# Patient Record
Sex: FEMALE | Race: BLACK OR AFRICAN AMERICAN | NOT HISPANIC OR LATINO | Employment: UNEMPLOYED | RURAL
[De-identification: names, ages, dates, MRNs, and addresses within clinical notes are randomized per-mention and may not be internally consistent; named-entity substitution may affect disease eponyms.]

---
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
## 2020-03-23 ENCOUNTER — HISTORICAL (OUTPATIENT)
Dept: ADMINISTRATIVE | Facility: HOSPITAL | Age: 35
End: 2020-03-23

## 2020-03-26 LAB
LAB AP CLINICAL INFORMATION: NORMAL
LAB AP COMMENTS: NORMAL
LAB AP GENERAL CAT - HISTORICAL: NORMAL
LAB AP INTERPRETATION/RESULT - HISTORICAL: NEGATIVE
LAB AP SPECIMEN ADEQUACY - HISTORICAL: NORMAL
LAB AP SPECIMEN SUBMITTED - HISTORICAL: NORMAL

## 2020-07-13 LAB
LEFT EYE DM RETINOPATHY: NORMAL
RIGHT EYE DM RETINOPATHY: NORMAL

## 2021-01-29 ENCOUNTER — HISTORICAL (OUTPATIENT)
Dept: ADMINISTRATIVE | Facility: HOSPITAL | Age: 36
End: 2021-01-29

## 2021-01-29 LAB
ABO: NORMAL
ALBUMIN SERPL BCP-MCNC: 1.6 G/DL (ref 3.5–5)
ALBUMIN/GLOB SERPL: 0.2 {RATIO}
ALP SERPL-CCNC: 141 U/L (ref 37–98)
ALT SERPL W P-5'-P-CCNC: 14 U/L (ref 13–56)
AMPHET UR QL SCN: NEGATIVE NG/ML
ANION GAP SERPL CALCULATED.3IONS-SCNC: 25 MMOL/L
ANION GAP SERPL CALCULATED.3IONS-SCNC: 31 MMOL/L
ANION GAP SERPL CALCULATED.3IONS-SCNC: 31 MMOL/L
ANISOCYTOSIS BLD QL SMEAR: ABNORMAL
ANTIBODY SCREEN: NORMAL
AST SERPL W P-5'-P-CCNC: 21 U/L (ref 15–37)
BACTERIA #/AREA URNS HPF: ABNORMAL /HPF
BARBITURATES UR QL SCN: NEGATIVE NG/ML
BASOPHILS # BLD AUTO: 0.04 X10E3/UL (ref 0–0.2)
BASOPHILS NFR BLD AUTO: 0.2 % (ref 0–1)
BENZODIAZ METAB UR QL SCN: NEGATIVE NG/ML
BILIRUB SERPL-MCNC: 0.3 MG/DL (ref 0–1.2)
BILIRUB UR QL STRIP: NEGATIVE MG/DL
BUN SERPL-MCNC: 29 MG/DL (ref 7–18)
BUN SERPL-MCNC: 31 MG/DL (ref 7–18)
BUN SERPL-MCNC: 32 MG/DL (ref 7–18)
BUN/CREAT SERPL: 16.6
CALCIUM SERPL-MCNC: 8.7 MG/DL (ref 8.5–10.1)
CALCIUM SERPL-MCNC: 8.8 MG/DL (ref 8.5–10.1)
CALCIUM SERPL-MCNC: 9 MG/DL (ref 8.5–10.1)
CANNABINOIDS UR QL SCN: NEGATIVE NG/ML
CHLORIDE SERPL-SCNC: 100 MMOL/L (ref 98–107)
CHLORIDE SERPL-SCNC: 106 MMOL/L (ref 98–107)
CHLORIDE SERPL-SCNC: 95 MMOL/L (ref 98–107)
CLARITY UR: CLEAR
CLARITY UR: CLEAR
CO2 SERPL-SCNC: 15 MMOL/L (ref 21–32)
CO2 SERPL-SCNC: 15 MMOL/L (ref 21–32)
CO2 SERPL-SCNC: 19 MMOL/L (ref 21–32)
COCAINE UR QL SCN: NEGATIVE NG/ML
COLOR UR: ABNORMAL
COLOR UR: ABNORMAL
CREAT SERPL-MCNC: 1.65 MG/DL (ref 0.55–1.02)
CREAT SERPL-MCNC: 1.79 MG/DL (ref 0.55–1.02)
CREAT SERPL-MCNC: 1.93 MG/DL (ref 0.55–1.02)
CRENATED CELLS: ABNORMAL
DOHLE BOD BLD QL SMEAR: ABNORMAL
EOSINOPHIL # BLD AUTO: 0 X10E3/UL (ref 0–0.5)
EOSINOPHIL NFR BLD AUTO: 0 % (ref 1–4)
ERYTHROCYTE [DISTWIDTH] IN BLOOD BY AUTOMATED COUNT: 18.9 % (ref 11.5–14.5)
EST. AVERAGE GLUCOSE BLD GHB EST-MCNC: 290 MG/DL
ETHANOL SERPL-MCNC: NORMAL MG/DL (ref 0–10)
GLOBULIN SER-MCNC: 6.7 G/DL (ref 2–4)
GLUCOSE SERPL-MCNC: 251 MG/DL (ref 70–105)
GLUCOSE SERPL-MCNC: 292 MG/DL (ref 70–105)
GLUCOSE SERPL-MCNC: 348 MG/DL (ref 74–106)
GLUCOSE SERPL-MCNC: 350 MG/DL (ref 70–105)
GLUCOSE SERPL-MCNC: 617 MG/DL (ref 74–106)
GLUCOSE SERPL-MCNC: 654 MG/DL (ref 74–106)
GLUCOSE UR STRIP-MCNC: 100 MG/DL
HBA1C MFR BLD HPLC: 11.3 %
HCG SERUM QUALITATIVE: NEGATIVE
HCG UR QL IA.RAPID: NEGATIVE
HCT VFR BLD AUTO: 22.2 % (ref 38–47)
HCT VFR BLD AUTO: 25.4 % (ref 38–47)
HCT VFR BLD AUTO: 27.1 % (ref 38–47)
HGB BLD-MCNC: 5.9 G/DL (ref 12–16)
HGB BLD-MCNC: 6.8 G/DL (ref 12–16)
HGB BLD-MCNC: 7.5 G/DL (ref 12–16)
HYPOCHROMIA BLD QL SMEAR: ABNORMAL
IMM GRANULOCYTES # BLD AUTO: 0.42 X10E3/UL (ref 0–0.04)
IMM GRANULOCYTES NFR BLD: 1.6 % (ref 0–0.4)
KETONES UR STRIP-SCNC: 40 MG/DL
KETONES UR STRIP-SCNC: ABNORMAL MMOL/L
LEUKOCYTE ESTERASE UR QL STRIP: NEGATIVE LEU/UL
LIPASE SERPL-CCNC: 58 U/L (ref 73–393)
LYMPHOCYTES # BLD AUTO: 1.14 X10E3/UL (ref 1–4.8)
LYMPHOCYTES NFR BLD AUTO: 4.4 % (ref 27–41)
LYMPHOCYTES NFR BLD MANUAL: 6 % (ref 27–41)
MAGNESIUM SERPL-MCNC: 2.1 MG/DL (ref 1.7–2.3)
MAGNESIUM SERPL-MCNC: 2.4 MG/DL (ref 1.7–2.3)
MCH RBC QN AUTO: 17.5 PG (ref 27–31)
MCHC RBC AUTO-ENTMCNC: 26.6 G/DL (ref 32–36)
MCHC RBC AUTO-ENTMCNC: 26.8 G/DL (ref 32–36)
MCHC RBC AUTO-ENTMCNC: 27.7 G/DL (ref 32–36)
MCV RBC AUTO: 65.9 FL (ref 80–96)
MICROCYTES BLD QL SMEAR: ABNORMAL
MONOCYTES # BLD AUTO: 2.04 X10E3/UL (ref 0–0.8)
MONOCYTES NFR BLD AUTO: 7.9 % (ref 2–6)
MONOCYTES NFR BLD MANUAL: 7 % (ref 2–6)
MPC BLD CALC-MCNC: 10.8 FL (ref 9.4–12.4)
MUCOUS THREADS #/AREA URNS HPF: ABNORMAL /HPF
MYELOCYTES NFR BLD MANUAL: 1 %
NEUTROPHILS # BLD AUTO: 22.34 X10E3/UL (ref 1.8–7.7)
NEUTROPHILS NFR BLD AUTO: 85.9 % (ref 53–65)
NEUTS BAND NFR BLD MANUAL: 16 % (ref 1–5)
NEUTS SEG NFR BLD MANUAL: 70 % (ref 50–62)
NITRITE UR QL STRIP: NEGATIVE
NRBC # BLD AUTO: 0 X10E3/UL (ref 0–0)
NRBC, AUTO (.00): 0 /100 (ref 0–0)
OPIATES UR QL SCN: NEGATIVE NG/ML
OVALOCYTES BLD QL SMEAR: ABNORMAL
PCP UR QL SCN: NEGATIVE NG/ML
PH UR STRIP: 5.5 PH UNITS (ref 5–8)
PHOSPHATE SERPL-MCNC: 2.4 MG/DL (ref 2.5–4.5)
PHOSPHATE SERPL-MCNC: 3.6 MG/DL (ref 2.5–4.5)
PLATELET # BLD AUTO: 670 X10E3/UL (ref 150–400)
PLATELET MORPHOLOGY: ABNORMAL
POC OCCULT BLOOD STOOL: POSITIVE
POLYCHROMASIA BLD QL SMEAR: ABNORMAL
POTASSIUM SERPL-SCNC: 3.6 MMOL/L (ref 3.5–5.1)
POTASSIUM SERPL-SCNC: 5 MMOL/L (ref 3.5–5.1)
POTASSIUM SERPL-SCNC: 5.1 MMOL/L (ref 3.5–5.1)
PROT SERPL-MCNC: 8.3 G/DL (ref 6.4–8.2)
PROT UR QL STRIP: 100 MG/DL
RBC # BLD AUTO: 3.37 X10E6/UL (ref 4.2–5.4)
RBC # UR STRIP: ABNORMAL ERY/UL
RBC #/AREA URNS HPF: ABNORMAL /HPF (ref 0–3)
RH TYPE: NORMAL
SARS-COV-2 RNA AMPLIFICATION, QUAL: NEGATIVE
SODIUM SERPL-SCNC: 136 MMOL/L (ref 136–145)
SODIUM SERPL-SCNC: 141 MMOL/L (ref 136–145)
SODIUM SERPL-SCNC: 146 MMOL/L (ref 136–145)
SP GR UR STRIP: 1.01 (ref 1–1.03)
SQUAMOUS #/AREA URNS LPF: ABNORMAL /LPF
TARGETS BLD QL SMEAR: ABNORMAL
TRICHOMONAS #/AREA URNS HPF: ABNORMAL /HPF
TROPONIN I SERPL-MCNC: <0.017 NG/ML (ref 0–0.06)
TSH SERPL DL<=0.005 MIU/L-ACNC: 1.76 UIU/ML (ref 0.36–3.74)
UNIT NUMBER: NORMAL
UNIT NUMBER: NORMAL
UNIT STATUS: NORMAL
UNIT STATUS: NORMAL
UROBILINOGEN UR STRIP-ACNC: 0.2 EU/DL
WBC # BLD AUTO: 25.98 X10E3/UL (ref 4.5–11)
WBC #/AREA URNS HPF: ABNORMAL /HPF (ref 0–5)
YEAST #/AREA URNS HPF: ABNORMAL /HPF

## 2021-01-30 ENCOUNTER — HISTORICAL (OUTPATIENT)
Dept: ADMINISTRATIVE | Facility: HOSPITAL | Age: 36
End: 2021-01-30

## 2021-01-30 LAB
ANION GAP SERPL CALCULATED.3IONS-SCNC: 15 MMOL/L
ANION GAP SERPL CALCULATED.3IONS-SCNC: 16 MMOL/L
ANION GAP SERPL CALCULATED.3IONS-SCNC: 19 MMOL/L
ANISOCYTOSIS BLD QL SMEAR: ABNORMAL
APTT PPP: 30.4 SECONDS (ref 25.2–37.3)
BASOPHILS # BLD AUTO: 0.03 X10E3/UL (ref 0–0.2)
BASOPHILS NFR BLD AUTO: 0.2 % (ref 0–1)
BUN SERPL-MCNC: 18 MG/DL (ref 7–18)
BUN SERPL-MCNC: 20 MG/DL (ref 7–18)
BUN SERPL-MCNC: 22 MG/DL (ref 7–18)
CALCIUM SERPL-MCNC: 8 MG/DL (ref 8.5–10.1)
CALCIUM SERPL-MCNC: 8.1 MG/DL (ref 8.5–10.1)
CALCIUM SERPL-MCNC: 8.1 MG/DL (ref 8.5–10.1)
CHLORIDE SERPL-SCNC: 111 MMOL/L (ref 98–107)
CHLORIDE SERPL-SCNC: 113 MMOL/L (ref 98–107)
CHLORIDE SERPL-SCNC: 114 MMOL/L (ref 98–107)
CO2 SERPL-SCNC: 20 MMOL/L (ref 21–32)
CO2 SERPL-SCNC: 22 MMOL/L (ref 21–32)
CO2 SERPL-SCNC: 23 MMOL/L (ref 21–32)
CREAT SERPL-MCNC: 1.25 MG/DL (ref 0.55–1.02)
CREAT SERPL-MCNC: 1.42 MG/DL (ref 0.55–1.02)
CREAT SERPL-MCNC: 1.58 MG/DL (ref 0.55–1.02)
CRENATED CELLS: ABNORMAL
CRP SERPL-MCNC: 32 UG/ML (ref 0–0.8)
EOSINOPHIL # BLD AUTO: 0.01 X10E3/UL (ref 0–0.5)
EOSINOPHIL NFR BLD AUTO: 0.1 % (ref 1–4)
ERYTHROCYTE [DISTWIDTH] IN BLOOD BY AUTOMATED COUNT: 21.8 % (ref 11.5–14.5)
ERYTHROCYTE [SEDIMENTATION RATE] IN BLOOD BY WESTERGREN METHOD: 137 MM/HR (ref 0–20)
FERRITIN SERPL-MCNC: 48 NG/ML (ref 8–252)
FOLATE SERPL-MCNC: 4.3 NG/ML (ref 3.1–17.5)
GLUCOSE SERPL-MCNC: 103 MG/DL (ref 70–105)
GLUCOSE SERPL-MCNC: 104 MG/DL (ref 70–105)
GLUCOSE SERPL-MCNC: 111 MG/DL (ref 70–105)
GLUCOSE SERPL-MCNC: 125 MG/DL (ref 70–105)
GLUCOSE SERPL-MCNC: 131 MG/DL (ref 70–105)
GLUCOSE SERPL-MCNC: 137 MG/DL (ref 70–105)
GLUCOSE SERPL-MCNC: 137 MG/DL (ref 74–106)
GLUCOSE SERPL-MCNC: 158 MG/DL (ref 74–106)
GLUCOSE SERPL-MCNC: 173 MG/DL (ref 70–105)
GLUCOSE SERPL-MCNC: 177 MG/DL (ref 70–105)
GLUCOSE SERPL-MCNC: 189 MG/DL (ref 70–105)
GLUCOSE SERPL-MCNC: 212 MG/DL (ref 70–105)
GLUCOSE SERPL-MCNC: 212 MG/DL (ref 74–106)
GLUCOSE SERPL-MCNC: 217 MG/DL (ref 70–105)
GLUCOSE SERPL-MCNC: 218 MG/DL (ref 70–105)
GLUCOSE SERPL-MCNC: 219 MG/DL (ref 70–105)
GLUCOSE SERPL-MCNC: 242 MG/DL (ref 70–105)
GLUCOSE SERPL-MCNC: 253 MG/DL (ref 70–105)
GLUCOSE SERPL-MCNC: 76 MG/DL (ref 70–105)
HCT VFR BLD AUTO: 23.2 % (ref 38–47)
HCT VFR BLD AUTO: 26.8 % (ref 38–47)
HGB BLD-MCNC: 6.9 G/DL (ref 12–16)
HGB BLD-MCNC: 8.1 G/DL (ref 12–16)
HYPOCHROMIA BLD QL SMEAR: ABNORMAL
IMM GRANULOCYTES # BLD AUTO: 0.21 X10E3/UL (ref 0–0.04)
IMM GRANULOCYTES NFR BLD: 1.2 % (ref 0–0.4)
IMM RETICS NFR: 22.5 % (ref 3–15.9)
INR BLD: 1.29 (ref 0–3.3)
IRON SATN MFR SERPL: 19 % (ref 14–50)
IRON SERPL-MCNC: 27 UG/DL (ref 50–170)
LDH SERPL-CCNC: 187 U/L (ref 84–246)
LYMPHOCYTES # BLD AUTO: 1.03 X10E3/UL (ref 1–4.8)
LYMPHOCYTES NFR BLD AUTO: 5.8 % (ref 27–41)
LYMPHOCYTES NFR BLD MANUAL: 6 % (ref 27–41)
MAGNESIUM SERPL-MCNC: 1.7 MG/DL (ref 1.7–2.3)
MAGNESIUM SERPL-MCNC: 1.7 MG/DL (ref 1.7–2.3)
MAGNESIUM SERPL-MCNC: 1.8 MG/DL (ref 1.7–2.3)
MCH RBC QN AUTO: 20.1 PG (ref 27–31)
MCHC RBC AUTO-ENTMCNC: 29.7 G/DL (ref 32–36)
MCHC RBC AUTO-ENTMCNC: 30.2 G/DL (ref 32–36)
MCV RBC AUTO: 67.4 FL (ref 80–96)
MICROCYTES BLD QL SMEAR: ABNORMAL
MONOCYTES # BLD AUTO: 1.14 X10E3/UL (ref 0–0.8)
MONOCYTES NFR BLD AUTO: 6.4 % (ref 2–6)
MONOCYTES NFR BLD MANUAL: 5 % (ref 2–6)
MPC BLD CALC-MCNC: 10.4 FL (ref 9.4–12.4)
NEUTROPHILS # BLD AUTO: 15.37 X10E3/UL (ref 1.8–7.7)
NEUTROPHILS NFR BLD AUTO: 86.3 % (ref 53–65)
NEUTS BAND NFR BLD MANUAL: 11 % (ref 1–5)
NEUTS SEG NFR BLD MANUAL: 78 % (ref 50–62)
NRBC # BLD AUTO: 0 X10E3/UL (ref 0–0)
NRBC, AUTO (.00): 0.1 /100 (ref 0–0)
OVALOCYTES BLD QL SMEAR: ABNORMAL
PHOSPHATE SERPL-MCNC: 1.8 MG/DL (ref 2.5–4.5)
PHOSPHATE SERPL-MCNC: 2.2 MG/DL (ref 2.5–4.5)
PHOSPHATE SERPL-MCNC: 2.6 MG/DL (ref 2.5–4.5)
PLATELET # BLD AUTO: 448 X10E3/UL (ref 150–400)
PLATELET MORPHOLOGY: ABNORMAL
POLYCHROMASIA BLD QL SMEAR: ABNORMAL
POTASSIUM SERPL-SCNC: 3.4 MMOL/L (ref 3.5–5.1)
POTASSIUM SERPL-SCNC: 3.6 MMOL/L (ref 3.5–5.1)
POTASSIUM SERPL-SCNC: 3.7 MMOL/L (ref 3.5–5.1)
PROTHROMBIN TIME: 15.4 SECONDS (ref 11.7–14.7)
RBC # BLD AUTO: 3.44 X10E6/UL (ref 4.2–5.4)
RETICS # AUTO: 0.02 X10E6/UL (ref 0.02–0.11)
RETICS/RBC NFR AUTO: 0.5 % (ref 0.4–2.2)
SODIUM SERPL-SCNC: 146 MMOL/L (ref 136–145)
SODIUM SERPL-SCNC: 148 MMOL/L (ref 136–145)
SODIUM SERPL-SCNC: 148 MMOL/L (ref 136–145)
TARGETS BLD QL SMEAR: ABNORMAL
TIBC SERPL-MCNC: 140 UG/DL (ref 250–450)
UNIT NUMBER: NORMAL
UNIT STATUS: NORMAL
VIT B12 SERPL-MCNC: 2338 PG/ML (ref 193–986)
WBC # BLD AUTO: 17.79 X10E3/UL (ref 4.5–11)

## 2021-01-31 ENCOUNTER — HISTORICAL (OUTPATIENT)
Dept: ADMINISTRATIVE | Facility: HOSPITAL | Age: 36
End: 2021-01-31

## 2021-01-31 LAB
ANION GAP SERPL CALCULATED.3IONS-SCNC: 18 MMOL/L
ANISOCYTOSIS BLD QL SMEAR: ABNORMAL
BASOPHILS # BLD AUTO: 0.07 X10E3/UL (ref 0–0.2)
BASOPHILS NFR BLD AUTO: 0.4 % (ref 0–1)
BASOPHILS NFR BLD MANUAL: 1 % (ref 0–1)
BUN SERPL-MCNC: 13 MG/DL (ref 7–18)
CALCIUM SERPL-MCNC: 7.7 MG/DL (ref 8.5–10.1)
CHLORIDE SERPL-SCNC: 117 MMOL/L (ref 98–107)
CO2 SERPL-SCNC: 19 MMOL/L (ref 21–32)
CREAT SERPL-MCNC: 1.22 MG/DL (ref 0.55–1.02)
CRENATED CELLS: ABNORMAL
EOSINOPHIL # BLD AUTO: 0.02 X10E3/UL (ref 0–0.5)
EOSINOPHIL NFR BLD AUTO: 0.1 % (ref 1–4)
ERYTHROCYTE [DISTWIDTH] IN BLOOD BY AUTOMATED COUNT: 23.1 % (ref 11.5–14.5)
GLUCOSE SERPL-MCNC: 126 MG/DL (ref 70–105)
GLUCOSE SERPL-MCNC: 127 MG/DL (ref 74–106)
GLUCOSE SERPL-MCNC: 147 MG/DL (ref 70–105)
GLUCOSE SERPL-MCNC: 194 MG/DL (ref 70–105)
GLUCOSE SERPL-MCNC: 215 MG/DL (ref 70–105)
HCT VFR BLD AUTO: 26.5 % (ref 38–47)
HGB BLD-MCNC: 7.7 G/DL (ref 12–16)
HYPOCHROMIA BLD QL SMEAR: ABNORMAL
IMM GRANULOCYTES # BLD AUTO: 0.43 X10E3/UL (ref 0–0.04)
IMM GRANULOCYTES NFR BLD: 2.2 % (ref 0–0.4)
LYMPHOCYTES # BLD AUTO: 1.61 X10E3/UL (ref 1–4.8)
LYMPHOCYTES NFR BLD AUTO: 8.2 % (ref 27–41)
LYMPHOCYTES NFR BLD MANUAL: 9 % (ref 27–41)
MAGNESIUM SERPL-MCNC: 1.6 MG/DL (ref 1.7–2.3)
MCH RBC QN AUTO: 20.8 PG (ref 27–31)
MCHC RBC AUTO-ENTMCNC: 29.1 G/DL (ref 32–36)
MCV RBC AUTO: 71.6 FL (ref 80–96)
MICROCYTES BLD QL SMEAR: ABNORMAL
MONOCYTES # BLD AUTO: 1.09 X10E3/UL (ref 0–0.8)
MONOCYTES NFR BLD AUTO: 5.6 % (ref 2–6)
MONOCYTES NFR BLD MANUAL: 6 % (ref 2–6)
MPC BLD CALC-MCNC: 10.4 FL (ref 9.4–12.4)
NEUTROPHILS # BLD AUTO: 16.41 X10E3/UL (ref 1.8–7.7)
NEUTROPHILS NFR BLD AUTO: 83.5 % (ref 53–65)
NEUTS BAND NFR BLD MANUAL: 13 % (ref 1–5)
NEUTS SEG NFR BLD MANUAL: 71 % (ref 50–62)
NRBC # BLD AUTO: 0 X10E3/UL (ref 0–0)
NRBC, AUTO (.00): 0 /100 (ref 0–0)
OVALOCYTES BLD QL SMEAR: ABNORMAL
PLATELET # BLD AUTO: 412 X10E3/UL (ref 150–400)
PLATELET MORPHOLOGY: ABNORMAL
POLYCHROMASIA BLD QL SMEAR: ABNORMAL
POTASSIUM SERPL-SCNC: 3.5 MMOL/L (ref 3.5–5.1)
RBC # BLD AUTO: 3.7 X10E6/UL (ref 4.2–5.4)
SODIUM SERPL-SCNC: 150 MMOL/L (ref 136–145)
TARGETS BLD QL SMEAR: ABNORMAL
WBC # BLD AUTO: 19.63 X10E3/UL (ref 4.5–11)

## 2021-02-01 ENCOUNTER — HISTORICAL (OUTPATIENT)
Dept: ADMINISTRATIVE | Facility: HOSPITAL | Age: 36
End: 2021-02-01

## 2021-02-01 LAB
ANION GAP SERPL CALCULATED.3IONS-SCNC: 15 MMOL/L
ANISOCYTOSIS BLD QL SMEAR: ABNORMAL
BASOPHILS # BLD AUTO: 0.05 X10E3/UL (ref 0–0.2)
BASOPHILS NFR BLD AUTO: 0.3 % (ref 0–1)
BUN SERPL-MCNC: 12 MG/DL (ref 7–18)
CALCIUM SERPL-MCNC: 7.5 MG/DL (ref 8.5–10.1)
CHLORIDE SERPL-SCNC: 110 MMOL/L (ref 98–107)
CO2 SERPL-SCNC: 21 MMOL/L (ref 21–32)
CREAT SERPL-MCNC: 1.25 MG/DL (ref 0.55–1.02)
EOSINOPHIL # BLD AUTO: 0.03 X10E3/UL (ref 0–0.5)
EOSINOPHIL NFR BLD AUTO: 0.2 % (ref 1–4)
EOSINOPHIL NFR BLD MANUAL: 1 % (ref 1–4)
ERYTHROCYTE [DISTWIDTH] IN BLOOD BY AUTOMATED COUNT: 23.9 % (ref 11.5–14.5)
GLUCOSE SERPL-MCNC: 137 MG/DL (ref 70–105)
GLUCOSE SERPL-MCNC: 140 MG/DL (ref 70–105)
GLUCOSE SERPL-MCNC: 140 MG/DL (ref 74–106)
GLUCOSE SERPL-MCNC: 155 MG/DL (ref 70–105)
GLUCOSE SERPL-MCNC: 165 MG/DL (ref 70–105)
GLUCOSE SERPL-MCNC: 268 MG/DL (ref 70–105)
GLUCOSE SERPL-MCNC: 499 MG/DL (ref 70–105)
GLUCOSE SERPL-MCNC: >600 MG/DL (ref 70–105)
GLUCOSE SERPL-MCNC: >600 MG/DL (ref 70–105)
HCT VFR BLD AUTO: 23.6 % (ref 38–47)
HGB BLD-MCNC: 7 G/DL (ref 12–16)
HYPOCHROMIA BLD QL SMEAR: ABNORMAL
IMM GRANULOCYTES # BLD AUTO: 0.58 X10E3/UL (ref 0–0.04)
IMM GRANULOCYTES NFR BLD: 3.3 % (ref 0–0.4)
LYMPHOCYTES # BLD AUTO: 1.87 X10E3/UL (ref 1–4.8)
LYMPHOCYTES NFR BLD AUTO: 10.6 % (ref 27–41)
LYMPHOCYTES NFR BLD MANUAL: 11 % (ref 27–41)
MAGNESIUM SERPL-MCNC: 1.7 MG/DL (ref 1.7–2.3)
MCH RBC QN AUTO: 20.6 PG (ref 27–31)
MCHC RBC AUTO-ENTMCNC: 29.7 G/DL (ref 32–36)
MCV RBC AUTO: 69.6 FL (ref 80–96)
METAMYELOCYTES NFR BLD MANUAL: 1 %
MICROCYTES BLD QL SMEAR: ABNORMAL
MONOCYTES # BLD AUTO: 0.84 X10E3/UL (ref 0–0.8)
MONOCYTES NFR BLD AUTO: 4.8 % (ref 2–6)
MONOCYTES NFR BLD MANUAL: 5 % (ref 2–6)
MPC BLD CALC-MCNC: 10 FL (ref 9.4–12.4)
MYELOCYTES NFR BLD MANUAL: 2 %
NEUTROPHILS # BLD AUTO: 14.2 X10E3/UL (ref 1.8–7.7)
NEUTROPHILS NFR BLD AUTO: 80.8 % (ref 53–65)
NEUTS SEG NFR BLD MANUAL: 80 % (ref 50–62)
NRBC # BLD AUTO: 0 X10E3/UL (ref 0–0)
NRBC, AUTO (.00): 0 /100 (ref 0–0)
OVALOCYTES BLD QL SMEAR: ABNORMAL
PHOSPHATE SERPL-MCNC: 2.6 MG/DL (ref 2.5–4.5)
PLATELET # BLD AUTO: 382 X10E3/UL (ref 150–400)
PLATELET MORPHOLOGY: ABNORMAL
POLYCHROMASIA BLD QL SMEAR: ABNORMAL
POTASSIUM SERPL-SCNC: 3.5 MMOL/L (ref 3.5–5.1)
RBC # BLD AUTO: 3.39 X10E6/UL (ref 4.2–5.4)
SODIUM SERPL-SCNC: 142 MMOL/L (ref 136–145)
TARGETS BLD QL SMEAR: ABNORMAL
VANCOMYCIN TROUGH SERPL-MCNC: 23.7 UG/ML (ref 10–20)
WBC # BLD AUTO: 17.57 X10E3/UL (ref 4.5–11)

## 2021-02-02 ENCOUNTER — HISTORICAL (OUTPATIENT)
Dept: ADMINISTRATIVE | Facility: HOSPITAL | Age: 36
End: 2021-02-02

## 2021-02-02 LAB
ALBUMIN SERPL BCP-MCNC: 1.1 G/DL (ref 3.5–5)
ALBUMIN/GLOB SERPL: 0.2 {RATIO}
ALP SERPL-CCNC: 184 U/L (ref 37–98)
ALT SERPL W P-5'-P-CCNC: 19 U/L (ref 13–56)
ANION GAP SERPL CALCULATED.3IONS-SCNC: 16 MMOL/L
ANISOCYTOSIS BLD QL SMEAR: ABNORMAL
AST SERPL W P-5'-P-CCNC: 22 U/L (ref 15–37)
BASOPHILS # BLD AUTO: 0.02 X10E3/UL (ref 0–0.2)
BASOPHILS NFR BLD AUTO: 0.1 % (ref 0–1)
BILIRUB SERPL-MCNC: 0.2 MG/DL (ref 0–1.2)
BUN SERPL-MCNC: 8 MG/DL (ref 7–18)
BUN/CREAT SERPL: 6
CALCIUM SERPL-MCNC: 7.9 MG/DL (ref 8.5–10.1)
CHLORIDE SERPL-SCNC: 109 MMOL/L (ref 98–107)
CO2 SERPL-SCNC: 21 MMOL/L (ref 21–32)
CREAT SERPL-MCNC: 1.33 MG/DL (ref 0.55–1.02)
DACRYOCYTES BLD QL SMEAR: ABNORMAL
EOSINOPHIL # BLD AUTO: 0.05 X10E3/UL (ref 0–0.5)
EOSINOPHIL NFR BLD AUTO: 0.3 % (ref 1–4)
ERYTHROCYTE [DISTWIDTH] IN BLOOD BY AUTOMATED COUNT: 25.4 % (ref 11.5–14.5)
GLOBULIN SER-MCNC: 5.8 G/DL (ref 2–4)
GLUCOSE SERPL-MCNC: 148 MG/DL (ref 70–105)
GLUCOSE SERPL-MCNC: 167 MG/DL (ref 70–105)
GLUCOSE SERPL-MCNC: 202 MG/DL (ref 70–105)
GLUCOSE SERPL-MCNC: 276 MG/DL (ref 74–106)
GLUCOSE SERPL-MCNC: 283 MG/DL (ref 70–105)
HCT VFR BLD AUTO: 26.1 % (ref 38–47)
HGB BLD-MCNC: 7.6 G/DL (ref 12–16)
HYPOCHROMIA BLD QL SMEAR: ABNORMAL
IMM GRANULOCYTES # BLD AUTO: 0.63 X10E3/UL (ref 0–0.04)
IMM GRANULOCYTES NFR BLD: 4.3 % (ref 0–0.4)
INSULIN SERPL-ACNC: NORMAL U[IU]/ML
LAB AP CLINICAL INFORMATION: NORMAL
LAB AP COMMENTS: NORMAL
LAB AP DIAGNOSIS - HISTORICAL: NORMAL
LAB AP GROSS PATHOLOGY - HISTORICAL: NORMAL
LAB AP SPECIMEN SUBMITTED - HISTORICAL: NORMAL
LYMPHOCYTES # BLD AUTO: 1.32 X10E3/UL (ref 1–4.8)
LYMPHOCYTES NFR BLD AUTO: 9 % (ref 27–41)
MCH RBC QN AUTO: 20.4 PG (ref 27–31)
MCHC RBC AUTO-ENTMCNC: 29.1 G/DL (ref 32–36)
MCV RBC AUTO: 70 FL (ref 80–96)
MICROCYTES BLD QL SMEAR: ABNORMAL
MONOCYTES # BLD AUTO: 0.82 X10E3/UL (ref 0–0.8)
MONOCYTES NFR BLD AUTO: 5.6 % (ref 2–6)
MPC BLD CALC-MCNC: 9.4 FL (ref 9.4–12.4)
NEUTROPHILS # BLD AUTO: 11.77 X10E3/UL (ref 1.8–7.7)
NEUTROPHILS NFR BLD AUTO: 80.7 % (ref 53–65)
NRBC # BLD AUTO: 0 X10E3/UL (ref 0–0)
NRBC, AUTO (.00): 0 /100 (ref 0–0)
OVALOCYTES BLD QL SMEAR: ABNORMAL
PLATELET # BLD AUTO: 396 X10E3/UL (ref 150–400)
PLATELET MORPHOLOGY: ABNORMAL
POLYCHROMASIA BLD QL SMEAR: ABNORMAL
POTASSIUM SERPL-SCNC: 3.2 MMOL/L (ref 3.5–5.1)
PROT SERPL-MCNC: 6.9 G/DL (ref 6.4–8.2)
RBC # BLD AUTO: 3.73 X10E6/UL (ref 4.2–5.4)
SCHISTOCYTES BLD QL AUTO: ABNORMAL
SODIUM SERPL-SCNC: 143 MMOL/L (ref 136–145)
WBC # BLD AUTO: 14.61 X10E3/UL (ref 4.5–11)

## 2021-02-03 ENCOUNTER — HISTORICAL (OUTPATIENT)
Dept: ADMINISTRATIVE | Facility: HOSPITAL | Age: 36
End: 2021-02-03

## 2021-02-03 LAB
ABO: NORMAL
ALBUMIN SERPL BCP-MCNC: 1 G/DL (ref 3.5–5)
ALBUMIN/GLOB SERPL: 0.2 {RATIO}
ALP SERPL-CCNC: 157 U/L (ref 37–98)
ALT SERPL W P-5'-P-CCNC: 13 U/L (ref 13–56)
ANION GAP SERPL CALCULATED.3IONS-SCNC: 13 MMOL/L
ANISOCYTOSIS BLD QL SMEAR: ABNORMAL
ANTIBODY SCREEN: NORMAL
AST SERPL W P-5'-P-CCNC: 16 U/L (ref 15–37)
BASOPHILS # BLD AUTO: 0.04 X10E3/UL (ref 0–0.2)
BASOPHILS NFR BLD AUTO: 0.3 % (ref 0–1)
BILIRUB SERPL-MCNC: 0.2 MG/DL (ref 0–1.2)
BUN SERPL-MCNC: 8 MG/DL (ref 7–18)
BUN/CREAT SERPL: 6.7
CALCIUM SERPL-MCNC: 7.4 MG/DL (ref 8.5–10.1)
CALPROTECTIN, FECAL: 116.5 MCG/G
CHLORIDE SERPL-SCNC: 109 MMOL/L (ref 98–107)
CO2 SERPL-SCNC: 21 MMOL/L (ref 21–32)
CREAT SERPL-MCNC: 1.2 MG/DL (ref 0.55–1.02)
CRENATED CELLS: ABNORMAL
EOSINOPHIL # BLD AUTO: 0.09 X10E3/UL (ref 0–0.5)
EOSINOPHIL NFR BLD AUTO: 0.6 % (ref 1–4)
ERYTHROCYTE [DISTWIDTH] IN BLOOD BY AUTOMATED COUNT: 25.2 % (ref 11.5–14.5)
GLOBULIN SER-MCNC: 5.2 G/DL (ref 2–4)
GLUCOSE SERPL-MCNC: 146 MG/DL (ref 70–105)
GLUCOSE SERPL-MCNC: 157 MG/DL (ref 74–106)
GLUCOSE SERPL-MCNC: 171 MG/DL (ref 70–105)
GLUCOSE SERPL-MCNC: 174 MG/DL (ref 70–105)
GLUCOSE SERPL-MCNC: 189 MG/DL (ref 70–105)
HCT VFR BLD AUTO: 22.7 % (ref 38–47)
HCT VFR BLD AUTO: 30.3 % (ref 38–47)
HGB BLD-MCNC: 6.7 G/DL (ref 12–16)
HGB BLD-MCNC: 9.3 G/DL (ref 12–16)
HYPOCHROMIA BLD QL SMEAR: ABNORMAL
IMM GRANULOCYTES # BLD AUTO: 0.73 X10E3/UL (ref 0–0.04)
IMM GRANULOCYTES NFR BLD: 4.7 % (ref 0–0.4)
LYMPHOCYTES # BLD AUTO: 2.03 X10E3/UL (ref 1–4.8)
LYMPHOCYTES NFR BLD AUTO: 13 % (ref 27–41)
MCH RBC QN AUTO: 21.1 PG (ref 27–31)
MCHC RBC AUTO-ENTMCNC: 29.5 G/DL (ref 32–36)
MCHC RBC AUTO-ENTMCNC: 30.7 G/DL (ref 32–36)
MCV RBC AUTO: 71.4 FL (ref 80–96)
MICROCYTES BLD QL SMEAR: ABNORMAL
MONOCYTES # BLD AUTO: 1.02 X10E3/UL (ref 0–0.8)
MONOCYTES NFR BLD AUTO: 6.5 % (ref 2–6)
MPC BLD CALC-MCNC: 10.2 FL (ref 9.4–12.4)
NEUTROPHILS # BLD AUTO: 11.68 X10E3/UL (ref 1.8–7.7)
NEUTROPHILS NFR BLD AUTO: 74.9 % (ref 53–65)
NRBC # BLD AUTO: 0 X10E3/UL (ref 0–0)
NRBC, AUTO (.00): 0 /100 (ref 0–0)
OCCULT BLOOD: POSITIVE
OVALOCYTES BLD QL SMEAR: ABNORMAL
PLATELET # BLD AUTO: 374 X10E3/UL (ref 150–400)
PLATELET MORPHOLOGY: ABNORMAL
POLYCHROMASIA BLD QL SMEAR: ABNORMAL
POTASSIUM SERPL-SCNC: 2.7 MMOL/L (ref 3.5–5.1)
PROT SERPL-MCNC: 6.2 G/DL (ref 6.4–8.2)
RBC # BLD AUTO: 3.18 X10E6/UL (ref 4.2–5.4)
REPORT: 25
REPORT: 25
REPORT: 6
REPORT: NORMAL
RH TYPE: NORMAL
SODIUM SERPL-SCNC: 140 MMOL/L (ref 136–145)
TARGETS BLD QL SMEAR: ABNORMAL
UNIT NUMBER: NORMAL
UNIT NUMBER: NORMAL
UNIT STATUS: NORMAL
UNIT STATUS: NORMAL
WBC # BLD AUTO: 15.59 X10E3/UL (ref 4.5–11)

## 2021-02-04 ENCOUNTER — HISTORICAL (OUTPATIENT)
Dept: ADMINISTRATIVE | Facility: HOSPITAL | Age: 36
End: 2021-02-04

## 2021-02-04 LAB
ALBUMIN SERPL BCP-MCNC: 1 G/DL (ref 3.5–5)
ALBUMIN/GLOB SERPL: 0.2 {RATIO}
ALP SERPL-CCNC: 141 U/L (ref 37–98)
ALT SERPL W P-5'-P-CCNC: 13 U/L (ref 13–56)
ANION GAP SERPL CALCULATED.3IONS-SCNC: 13 MMOL/L
ANISOCYTOSIS BLD QL SMEAR: ABNORMAL
AST SERPL W P-5'-P-CCNC: 14 U/L (ref 15–37)
BASOPHILS # BLD AUTO: 0.04 X10E3/UL (ref 0–0.2)
BASOPHILS NFR BLD AUTO: 0.3 % (ref 0–1)
BILIRUB SERPL-MCNC: 0.2 MG/DL (ref 0–1.2)
BUN SERPL-MCNC: 5 MG/DL (ref 7–18)
BUN/CREAT SERPL: 4.6
CALCIUM SERPL-MCNC: 7.3 MG/DL (ref 8.5–10.1)
CHLORIDE SERPL-SCNC: 111 MMOL/L (ref 98–107)
CO2 SERPL-SCNC: 21 MMOL/L (ref 21–32)
CREAT SERPL-MCNC: 1.09 MG/DL (ref 0.55–1.02)
CRENATED CELLS: ABNORMAL
EOSINOPHIL # BLD AUTO: 0.07 X10E3/UL (ref 0–0.5)
EOSINOPHIL NFR BLD AUTO: 0.4 % (ref 1–4)
ERYTHROCYTE [DISTWIDTH] IN BLOOD BY AUTOMATED COUNT: 24.8 % (ref 11.5–14.5)
GLOBULIN SER-MCNC: 5.3 G/DL (ref 2–4)
GLUCOSE SERPL-MCNC: 136 MG/DL (ref 70–105)
GLUCOSE SERPL-MCNC: 147 MG/DL (ref 74–106)
GLUCOSE SERPL-MCNC: 153 MG/DL (ref 70–105)
GLUCOSE SERPL-MCNC: 43 MG/DL (ref 70–105)
GLUCOSE SERPL-MCNC: 75 MG/DL (ref 70–105)
GLUCOSE SERPL-MCNC: 86 MG/DL (ref 70–105)
HCT VFR BLD AUTO: 28.3 % (ref 38–47)
HGB BLD-MCNC: 8.7 G/DL (ref 12–16)
HYPOCHROMIA BLD QL SMEAR: ABNORMAL
IMM GRANULOCYTES # BLD AUTO: 0.62 X10E3/UL (ref 0–0.04)
IMM GRANULOCYTES NFR BLD: 3.9 % (ref 0–0.4)
LYMPHOCYTES # BLD AUTO: 1.54 X10E3/UL (ref 1–4.8)
LYMPHOCYTES NFR BLD AUTO: 9.8 % (ref 27–41)
MAGNESIUM SERPL-MCNC: 1.2 MG/DL (ref 1.7–2.3)
MCH RBC QN AUTO: 22.4 PG (ref 27–31)
MCHC RBC AUTO-ENTMCNC: 30.7 G/DL (ref 32–36)
MCV RBC AUTO: 72.8 FL (ref 80–96)
MICROCYTES BLD QL SMEAR: ABNORMAL
MONOCYTES # BLD AUTO: 1.18 X10E3/UL (ref 0–0.8)
MONOCYTES NFR BLD AUTO: 7.5 % (ref 2–6)
MPC BLD CALC-MCNC: 9.6 FL (ref 9.4–12.4)
NEUTROPHILS # BLD AUTO: 12.26 X10E3/UL (ref 1.8–7.7)
NEUTROPHILS NFR BLD AUTO: 78.1 % (ref 53–65)
NRBC # BLD AUTO: 0 X10E3/UL (ref 0–0)
NRBC, AUTO (.00): 0 /100 (ref 0–0)
OVALOCYTES BLD QL SMEAR: ABNORMAL
PHOSPHATE SERPL-MCNC: 2.4 MG/DL (ref 2.5–4.5)
PLATELET # BLD AUTO: 389 X10E3/UL (ref 150–400)
PLATELET MORPHOLOGY: ABNORMAL
POLYCHROMASIA BLD QL SMEAR: ABNORMAL
POTASSIUM SERPL-SCNC: 3.4 MMOL/L (ref 3.5–5.1)
PROT SERPL-MCNC: 6.3 G/DL (ref 6.4–8.2)
RBC # BLD AUTO: 3.89 X10E6/UL (ref 4.2–5.4)
REPORT: NORMAL
SCHISTOCYTES BLD QL AUTO: ABNORMAL
SODIUM SERPL-SCNC: 142 MMOL/L (ref 136–145)
TARGETS BLD QL SMEAR: ABNORMAL
VANCOMYCIN TROUGH SERPL-MCNC: 12.9 UG/ML (ref 10–20)
WBC # BLD AUTO: 15.71 X10E3/UL (ref 4.5–11)

## 2021-02-05 ENCOUNTER — HISTORICAL (OUTPATIENT)
Dept: ADMINISTRATIVE | Facility: HOSPITAL | Age: 36
End: 2021-02-05

## 2021-02-05 LAB
ALBUMIN SERPL BCP-MCNC: 1.1 G/DL (ref 3.5–5)
ALBUMIN/GLOB SERPL: 0.2 {RATIO}
ALP SERPL-CCNC: 159 U/L (ref 37–98)
ALT SERPL W P-5'-P-CCNC: 12 U/L (ref 13–56)
ANION GAP SERPL CALCULATED.3IONS-SCNC: 16 MMOL/L
ANISOCYTOSIS BLD QL SMEAR: ABNORMAL
AST SERPL W P-5'-P-CCNC: 20 U/L (ref 15–37)
BASOPHILS # BLD AUTO: 0.05 X10E3/UL (ref 0–0.2)
BASOPHILS NFR BLD AUTO: 0.3 % (ref 0–1)
BILIRUB SERPL-MCNC: 0.2 MG/DL (ref 0–1.2)
BUN SERPL-MCNC: 4 MG/DL (ref 7–18)
BUN/CREAT SERPL: 3.5
CALCIUM SERPL-MCNC: 8.2 MG/DL (ref 8.5–10.1)
CHLORIDE SERPL-SCNC: 112 MMOL/L (ref 98–107)
CO2 SERPL-SCNC: 19 MMOL/L (ref 21–32)
CREAT SERPL-MCNC: 1.13 MG/DL (ref 0.55–1.02)
CRENATED CELLS: ABNORMAL
EOSINOPHIL # BLD AUTO: 0.08 X10E3/UL (ref 0–0.5)
EOSINOPHIL NFR BLD AUTO: 0.6 % (ref 1–4)
ERYTHROCYTE [DISTWIDTH] IN BLOOD BY AUTOMATED COUNT: 26 % (ref 11.5–14.5)
GLOBULIN SER-MCNC: 6 G/DL (ref 2–4)
GLUCOSE SERPL-MCNC: 100 MG/DL (ref 70–105)
GLUCOSE SERPL-MCNC: 112 MG/DL (ref 70–105)
GLUCOSE SERPL-MCNC: 127 MG/DL (ref 70–105)
GLUCOSE SERPL-MCNC: 45 MG/DL (ref 74–106)
GLUCOSE SERPL-MCNC: 63 MG/DL (ref 70–105)
HCT VFR BLD AUTO: 31.5 % (ref 38–47)
HGB BLD-MCNC: 9.5 G/DL (ref 12–16)
HYPOCHROMIA BLD QL SMEAR: SLIGHT
IMM GRANULOCYTES # BLD AUTO: 0.7 X10E3/UL (ref 0–0.04)
IMM GRANULOCYTES NFR BLD: 4.9 % (ref 0–0.4)
LYMPHOCYTES # BLD AUTO: 2.01 X10E3/UL (ref 1–4.8)
LYMPHOCYTES NFR BLD AUTO: 14 % (ref 27–41)
MAGNESIUM SERPL-MCNC: 2.1 MG/DL (ref 1.7–2.3)
MCH RBC QN AUTO: 22.2 PG (ref 27–31)
MCHC RBC AUTO-ENTMCNC: 30.2 G/DL (ref 32–36)
MCV RBC AUTO: 73.8 FL (ref 80–96)
MICROCYTES BLD QL SMEAR: ABNORMAL
MONOCYTES # BLD AUTO: 1.08 X10E3/UL (ref 0–0.8)
MONOCYTES NFR BLD AUTO: 7.5 % (ref 2–6)
MPC BLD CALC-MCNC: 9.8 FL (ref 9.4–12.4)
NEUTROPHILS # BLD AUTO: 10.42 X10E3/UL (ref 1.8–7.7)
NEUTROPHILS NFR BLD AUTO: 72.7 % (ref 53–65)
NRBC # BLD AUTO: 0 X10E3/UL (ref 0–0)
NRBC, AUTO (.00): 0 /100 (ref 0–0)
OVALOCYTES BLD QL SMEAR: ABNORMAL
PHOSPHATE SERPL-MCNC: 3 MG/DL (ref 2.5–4.5)
PLATELET # BLD AUTO: 456 X10E3/UL (ref 150–400)
PLATELET MORPHOLOGY: ABNORMAL
POLYCHROMASIA BLD QL SMEAR: ABNORMAL
POTASSIUM SERPL-SCNC: 3.7 MMOL/L (ref 3.5–5.1)
PROT SERPL-MCNC: 7.1 G/DL (ref 6.4–8.2)
RBC # BLD AUTO: 4.27 X10E6/UL (ref 4.2–5.4)
SODIUM SERPL-SCNC: 143 MMOL/L (ref 136–145)
WBC # BLD AUTO: 14.34 X10E3/UL (ref 4.5–11)

## 2021-02-06 ENCOUNTER — HISTORICAL (OUTPATIENT)
Dept: ADMINISTRATIVE | Facility: HOSPITAL | Age: 36
End: 2021-02-06

## 2021-02-06 LAB
GLUCOSE SERPL-MCNC: 130 MG/DL (ref 70–105)
GLUCOSE SERPL-MCNC: 131 MG/DL (ref 70–105)
GLUCOSE SERPL-MCNC: 133 MG/DL (ref 70–105)
GLUCOSE SERPL-MCNC: 150 MG/DL (ref 70–105)

## 2021-02-07 ENCOUNTER — HISTORICAL (OUTPATIENT)
Dept: ADMINISTRATIVE | Facility: HOSPITAL | Age: 36
End: 2021-02-07

## 2021-02-07 LAB
ALBUMIN SERPL BCP-MCNC: 1.1 G/DL (ref 3.5–5)
ALBUMIN/GLOB SERPL: 0.2 {RATIO}
ALP SERPL-CCNC: 143 U/L (ref 37–98)
ALT SERPL W P-5'-P-CCNC: 11 U/L (ref 13–56)
ANION GAP SERPL CALCULATED.3IONS-SCNC: 17 MMOL/L
ANISOCYTOSIS BLD QL SMEAR: ABNORMAL
AST SERPL W P-5'-P-CCNC: 18 U/L (ref 15–37)
BASOPHILS # BLD AUTO: 0.03 X10E3/UL (ref 0–0.2)
BASOPHILS NFR BLD AUTO: 0.2 % (ref 0–1)
BILIRUB SERPL-MCNC: 0.2 MG/DL (ref 0–1.2)
BUN SERPL-MCNC: 11 MG/DL (ref 7–18)
BUN/CREAT SERPL: 7.5
CALCIUM SERPL-MCNC: 8.1 MG/DL (ref 8.5–10.1)
CHLORIDE SERPL-SCNC: 112 MMOL/L (ref 98–107)
CO2 SERPL-SCNC: 18 MMOL/L (ref 21–32)
CREAT SERPL-MCNC: 1.47 MG/DL (ref 0.55–1.02)
EOSINOPHIL # BLD AUTO: 0.1 X10E3/UL (ref 0–0.5)
EOSINOPHIL NFR BLD AUTO: 0.8 % (ref 1–4)
ERYTHROCYTE [DISTWIDTH] IN BLOOD BY AUTOMATED COUNT: 26.7 % (ref 11.5–14.5)
GLOBULIN SER-MCNC: 5.9 G/DL (ref 2–4)
GLUCOSE SERPL-MCNC: 124 MG/DL (ref 70–105)
GLUCOSE SERPL-MCNC: 129 MG/DL (ref 70–105)
GLUCOSE SERPL-MCNC: 131 MG/DL (ref 70–105)
GLUCOSE SERPL-MCNC: 134 MG/DL (ref 74–106)
GLUCOSE SERPL-MCNC: 150 MG/DL (ref 70–105)
HCT VFR BLD AUTO: 29.2 % (ref 38–47)
HGB BLD-MCNC: 8.7 G/DL (ref 12–16)
HYPOCHROMIA BLD QL SMEAR: SLIGHT
IMM GRANULOCYTES # BLD AUTO: 0.3 X10E3/UL (ref 0–0.04)
IMM GRANULOCYTES NFR BLD: 2.3 % (ref 0–0.4)
LYMPHOCYTES # BLD AUTO: 1.94 X10E3/UL (ref 1–4.8)
LYMPHOCYTES NFR BLD AUTO: 15.2 % (ref 27–41)
MAGNESIUM SERPL-MCNC: 1.6 MG/DL (ref 1.7–2.3)
MCH RBC QN AUTO: 22.3 PG (ref 27–31)
MCHC RBC AUTO-ENTMCNC: 29.8 G/DL (ref 32–36)
MCV RBC AUTO: 74.7 FL (ref 80–96)
MICROCYTES BLD QL SMEAR: ABNORMAL
MONOCYTES # BLD AUTO: 0.89 X10E3/UL (ref 0–0.8)
MONOCYTES NFR BLD AUTO: 7 % (ref 2–6)
MPC BLD CALC-MCNC: 9.5 FL (ref 9.4–12.4)
NEUTROPHILS # BLD AUTO: 9.51 X10E3/UL (ref 1.8–7.7)
NEUTROPHILS NFR BLD AUTO: 74.5 % (ref 53–65)
NRBC # BLD AUTO: 0 X10E3/UL (ref 0–0)
NRBC, AUTO (.00): 0 /100 (ref 0–0)
PHOSPHATE SERPL-MCNC: 2.9 MG/DL (ref 2.5–4.5)
PLATELET # BLD AUTO: 523 X10E3/UL (ref 150–400)
PLATELET MORPHOLOGY: ABNORMAL
POLYCHROMASIA BLD QL SMEAR: ABNORMAL
POTASSIUM SERPL-SCNC: 3.5 MMOL/L (ref 3.5–5.1)
PROT SERPL-MCNC: 7 G/DL (ref 6.4–8.2)
RBC # BLD AUTO: 3.91 X10E6/UL (ref 4.2–5.4)
SODIUM SERPL-SCNC: 143 MMOL/L (ref 136–145)
WBC # BLD AUTO: 12.77 X10E3/UL (ref 4.5–11)

## 2021-02-08 ENCOUNTER — HISTORICAL (OUTPATIENT)
Dept: ADMINISTRATIVE | Facility: HOSPITAL | Age: 36
End: 2021-02-08

## 2021-02-08 LAB
ALBUMIN SERPL BCP-MCNC: 1.1 G/DL (ref 3.5–5)
ALBUMIN/GLOB SERPL: 0.2 {RATIO}
ALP SERPL-CCNC: 149 U/L (ref 37–98)
ALT SERPL W P-5'-P-CCNC: 10 U/L (ref 13–56)
ANION GAP SERPL CALCULATED.3IONS-SCNC: 16 MMOL/L
ANISOCYTOSIS BLD QL SMEAR: ABNORMAL
AST SERPL W P-5'-P-CCNC: 15 U/L (ref 15–37)
BASOPHILS # BLD AUTO: 0.05 X10E3/UL (ref 0–0.2)
BASOPHILS NFR BLD AUTO: 0.3 % (ref 0–1)
BILIRUB SERPL-MCNC: 0.2 MG/DL (ref 0–1.2)
BUN SERPL-MCNC: 12 MG/DL (ref 7–18)
BUN/CREAT SERPL: 6.7
CALCIUM SERPL-MCNC: 8.4 MG/DL (ref 8.5–10.1)
CHLORIDE SERPL-SCNC: 112 MMOL/L (ref 98–107)
CO2 SERPL-SCNC: 20 MMOL/L (ref 21–32)
CREAT SERPL-MCNC: 1.79 MG/DL (ref 0.55–1.02)
EOSINOPHIL # BLD AUTO: 0.04 X10E3/UL (ref 0–0.5)
EOSINOPHIL NFR BLD AUTO: 0.2 % (ref 1–4)
ERYTHROCYTE [DISTWIDTH] IN BLOOD BY AUTOMATED COUNT: 27.6 % (ref 11.5–14.5)
GLOBULIN SER-MCNC: 6.2 G/DL (ref 2–4)
GLUCOSE SERPL-MCNC: 107 MG/DL (ref 70–105)
GLUCOSE SERPL-MCNC: 111 MG/DL (ref 70–105)
GLUCOSE SERPL-MCNC: 113 MG/DL (ref 70–105)
GLUCOSE SERPL-MCNC: 125 MG/DL (ref 74–106)
GLUCOSE SERPL-MCNC: 151 MG/DL (ref 70–105)
HCT VFR BLD AUTO: 33.9 % (ref 38–47)
HGB BLD-MCNC: 10.1 G/DL (ref 12–16)
IMM GRANULOCYTES # BLD AUTO: 0.4 X10E3/UL (ref 0–0.04)
IMM GRANULOCYTES NFR BLD: 2.2 % (ref 0–0.4)
LYMPHOCYTES # BLD AUTO: 1.68 X10E3/UL (ref 1–4.8)
LYMPHOCYTES NFR BLD AUTO: 9.4 % (ref 27–41)
MAGNESIUM SERPL-MCNC: 1.9 MG/DL (ref 1.7–2.3)
MCH RBC QN AUTO: 22.1 PG (ref 27–31)
MCHC RBC AUTO-ENTMCNC: 29.8 G/DL (ref 32–36)
MCV RBC AUTO: 74.2 FL (ref 80–96)
MICROCYTES BLD QL SMEAR: ABNORMAL
MONOCYTES # BLD AUTO: 1.02 X10E3/UL (ref 0–0.8)
MONOCYTES NFR BLD AUTO: 5.7 % (ref 2–6)
MPC BLD CALC-MCNC: 9.3 FL (ref 9.4–12.4)
NEUTROPHILS # BLD AUTO: 14.6 X10E3/UL (ref 1.8–7.7)
NEUTROPHILS NFR BLD AUTO: 82.2 % (ref 53–65)
NRBC # BLD AUTO: 0 X10E3/UL (ref 0–0)
NRBC, AUTO (.00): 0 /100 (ref 0–0)
OVALOCYTES BLD QL SMEAR: ABNORMAL
PHOSPHATE SERPL-MCNC: 4.3 MG/DL (ref 2.5–4.5)
PLATELET # BLD AUTO: 620 X10E3/UL (ref 150–400)
PLATELET MORPHOLOGY: ABNORMAL
POLYCHROMASIA BLD QL SMEAR: ABNORMAL
POTASSIUM SERPL-SCNC: 3.3 MMOL/L (ref 3.5–5.1)
PROT SERPL-MCNC: 7.3 G/DL (ref 6.4–8.2)
RBC # BLD AUTO: 4.57 X10E6/UL (ref 4.2–5.4)
SODIUM SERPL-SCNC: 145 MMOL/L (ref 136–145)
WBC # BLD AUTO: 17.79 X10E3/UL (ref 4.5–11)

## 2021-02-09 ENCOUNTER — HISTORICAL (OUTPATIENT)
Dept: ADMINISTRATIVE | Facility: HOSPITAL | Age: 36
End: 2021-02-09

## 2021-02-09 LAB
ANION GAP SERPL CALCULATED.3IONS-SCNC: 19 MMOL/L
ANISOCYTOSIS BLD QL SMEAR: ABNORMAL
BASOPHILS # BLD AUTO: 0.05 X10E3/UL (ref 0–0.2)
BASOPHILS NFR BLD AUTO: 0.4 % (ref 0–1)
BUN SERPL-MCNC: 15 MG/DL (ref 7–18)
CALCIUM SERPL-MCNC: 8.4 MG/DL (ref 8.5–10.1)
CHLORIDE SERPL-SCNC: 113 MMOL/L (ref 98–107)
CO2 SERPL-SCNC: 16 MMOL/L (ref 21–32)
CREAT SERPL-MCNC: 2.27 MG/DL (ref 0.55–1.02)
CRENATED CELLS: ABNORMAL
EOSINOPHIL # BLD AUTO: 0.05 X10E3/UL (ref 0–0.5)
EOSINOPHIL NFR BLD AUTO: 0.4 % (ref 1–4)
ERYTHROCYTE [DISTWIDTH] IN BLOOD BY AUTOMATED COUNT: 28.1 % (ref 11.5–14.5)
GLUCOSE SERPL-MCNC: 108 MG/DL (ref 70–105)
GLUCOSE SERPL-MCNC: 130 MG/DL (ref 74–106)
GLUCOSE SERPL-MCNC: 153 MG/DL (ref 70–105)
GLUCOSE SERPL-MCNC: 80 MG/DL (ref 70–105)
GLUCOSE SERPL-MCNC: 85 MG/DL (ref 70–105)
HCT VFR BLD AUTO: 31 % (ref 38–47)
HGB BLD-MCNC: 9.1 G/DL (ref 12–16)
HYPOCHROMIA BLD QL SMEAR: SLIGHT
IMM GRANULOCYTES # BLD AUTO: 0.3 X10E3/UL (ref 0–0.04)
IMM GRANULOCYTES NFR BLD: 2.2 % (ref 0–0.4)
LYMPHOCYTES # BLD AUTO: 2.03 X10E3/UL (ref 1–4.8)
LYMPHOCYTES NFR BLD AUTO: 14.8 % (ref 27–41)
MCH RBC QN AUTO: 22.1 PG (ref 27–31)
MCHC RBC AUTO-ENTMCNC: 29.4 G/DL (ref 32–36)
MCV RBC AUTO: 75.2 FL (ref 80–96)
MICROCYTES BLD QL SMEAR: ABNORMAL
MONOCYTES # BLD AUTO: 1.03 X10E3/UL (ref 0–0.8)
MONOCYTES NFR BLD AUTO: 7.5 % (ref 2–6)
MPC BLD CALC-MCNC: 9.6 FL (ref 9.4–12.4)
NEUTROPHILS # BLD AUTO: 10.3 X10E3/UL (ref 1.8–7.7)
NEUTROPHILS NFR BLD AUTO: 74.7 % (ref 53–65)
NRBC # BLD AUTO: 0 X10E3/UL (ref 0–0)
NRBC, AUTO (.00): 0 /100 (ref 0–0)
OVALOCYTES BLD QL SMEAR: ABNORMAL
PLATELET # BLD AUTO: 602 X10E3/UL (ref 150–400)
PLATELET MORPHOLOGY: ABNORMAL
POLYCHROMASIA BLD QL SMEAR: ABNORMAL
POTASSIUM SERPL-SCNC: 3.8 MMOL/L (ref 3.5–5.1)
RBC # BLD AUTO: 4.12 X10E6/UL (ref 4.2–5.4)
SODIUM SERPL-SCNC: 144 MMOL/L (ref 136–145)
WBC # BLD AUTO: 13.76 X10E3/UL (ref 4.5–11)

## 2021-02-10 ENCOUNTER — HISTORICAL (OUTPATIENT)
Dept: ADMINISTRATIVE | Facility: HOSPITAL | Age: 36
End: 2021-02-10

## 2021-02-10 LAB
ALBUMIN SERPL BCP-MCNC: 1.2 G/DL (ref 3.5–5)
ALBUMIN/GLOB SERPL: 0.2 {RATIO}
ALP SERPL-CCNC: 134 U/L (ref 37–98)
ALT SERPL W P-5'-P-CCNC: 7 U/L (ref 13–56)
ANION GAP SERPL CALCULATED.3IONS-SCNC: 16 MMOL/L
ANISOCYTOSIS BLD QL SMEAR: ABNORMAL
AST SERPL W P-5'-P-CCNC: 13 U/L (ref 15–37)
BASOPHILS # BLD AUTO: 0.04 X10E3/UL (ref 0–0.2)
BASOPHILS NFR BLD AUTO: 0.3 % (ref 0–1)
BILIRUB SERPL-MCNC: 0.1 MG/DL (ref 0–1.2)
BUN SERPL-MCNC: 15 MG/DL (ref 7–18)
BUN/CREAT SERPL: 7.3
CALCIUM SERPL-MCNC: 8.4 MG/DL (ref 8.5–10.1)
CHLORIDE SERPL-SCNC: 111 MMOL/L (ref 98–107)
CO2 SERPL-SCNC: 19 MMOL/L (ref 21–32)
CREAT SERPL-MCNC: 2.06 MG/DL (ref 0.55–1.02)
CRENATED CELLS: ABNORMAL
EOSINOPHIL # BLD AUTO: 0.08 X10E3/UL (ref 0–0.5)
EOSINOPHIL NFR BLD AUTO: 0.7 % (ref 1–4)
ERYTHROCYTE [DISTWIDTH] IN BLOOD BY AUTOMATED COUNT: 27.9 % (ref 11.5–14.5)
GLOBULIN SER-MCNC: 6 G/DL (ref 2–4)
GLUCOSE SERPL-MCNC: 117 MG/DL (ref 70–105)
GLUCOSE SERPL-MCNC: 181 MG/DL (ref 70–105)
GLUCOSE SERPL-MCNC: 210 MG/DL (ref 70–105)
GLUCOSE SERPL-MCNC: 65 MG/DL (ref 74–106)
GLUCOSE SERPL-MCNC: 90 MG/DL (ref 70–105)
HCT VFR BLD AUTO: 29.7 % (ref 38–47)
HGB BLD-MCNC: 8.9 G/DL (ref 12–16)
HYPOCHROMIA BLD QL SMEAR: SLIGHT
IMM GRANULOCYTES # BLD AUTO: 0.22 X10E3/UL (ref 0–0.04)
IMM GRANULOCYTES NFR BLD: 1.8 % (ref 0–0.4)
LYMPHOCYTES # BLD AUTO: 2.21 X10E3/UL (ref 1–4.8)
LYMPHOCYTES NFR BLD AUTO: 18.5 % (ref 27–41)
MAGNESIUM SERPL-MCNC: 1.8 MG/DL (ref 1.7–2.3)
MCH RBC QN AUTO: 22.2 PG (ref 27–31)
MCHC RBC AUTO-ENTMCNC: 30 G/DL (ref 32–36)
MCV RBC AUTO: 74.1 FL (ref 80–96)
MICROCYTES BLD QL SMEAR: ABNORMAL
MONOCYTES # BLD AUTO: 0.74 X10E3/UL (ref 0–0.8)
MONOCYTES NFR BLD AUTO: 6.2 % (ref 2–6)
MPC BLD CALC-MCNC: 9.8 FL (ref 9.4–12.4)
NEUTROPHILS # BLD AUTO: 8.65 X10E3/UL (ref 1.8–7.7)
NEUTROPHILS NFR BLD AUTO: 72.5 % (ref 53–65)
NRBC # BLD AUTO: 0 X10E3/UL (ref 0–0)
NRBC, AUTO (.00): 0 /100 (ref 0–0)
OVALOCYTES BLD QL SMEAR: ABNORMAL
PHOSPHATE SERPL-MCNC: 3.4 MG/DL (ref 2.5–4.5)
PLATELET # BLD AUTO: 632 X10E3/UL (ref 150–400)
PLATELET MORPHOLOGY: ABNORMAL
POLYCHROMASIA BLD QL SMEAR: ABNORMAL
POTASSIUM SERPL-SCNC: 3.4 MMOL/L (ref 3.5–5.1)
PROT SERPL-MCNC: 7.2 G/DL (ref 6.4–8.2)
RBC # BLD AUTO: 4.01 X10E6/UL (ref 4.2–5.4)
SODIUM SERPL-SCNC: 143 MMOL/L (ref 136–145)
TARGETS BLD QL SMEAR: ABNORMAL
WBC # BLD AUTO: 11.94 X10E3/UL (ref 4.5–11)

## 2021-02-11 ENCOUNTER — HISTORICAL (OUTPATIENT)
Dept: ADMINISTRATIVE | Facility: HOSPITAL | Age: 36
End: 2021-02-11

## 2021-02-11 LAB
ALBUMIN SERPL BCP-MCNC: 1.2 G/DL (ref 3.5–5)
ALBUMIN/GLOB SERPL: 0.2 {RATIO}
ALP SERPL-CCNC: 138 U/L (ref 37–98)
ALT SERPL W P-5'-P-CCNC: 7 U/L (ref 13–56)
ANION GAP SERPL CALCULATED.3IONS-SCNC: 15 MMOL/L
ANISOCYTOSIS BLD QL SMEAR: ABNORMAL
AST SERPL W P-5'-P-CCNC: 13 U/L (ref 15–37)
BASOPHILS # BLD AUTO: 0.03 X10E3/UL (ref 0–0.2)
BASOPHILS NFR BLD AUTO: 0.3 % (ref 0–1)
BILIRUB SERPL-MCNC: 0.2 MG/DL (ref 0–1.2)
BUN SERPL-MCNC: 14 MG/DL (ref 7–18)
BUN/CREAT SERPL: 7.4
CALCIUM SERPL-MCNC: 8.3 MG/DL (ref 8.5–10.1)
CHLORIDE SERPL-SCNC: 110 MMOL/L (ref 98–107)
CO2 SERPL-SCNC: 21 MMOL/L (ref 21–32)
CREAT SERPL-MCNC: 1.9 MG/DL (ref 0.55–1.02)
CRENATED CELLS: ABNORMAL
EOSINOPHIL # BLD AUTO: 0.06 X10E3/UL (ref 0–0.5)
EOSINOPHIL NFR BLD AUTO: 0.6 % (ref 1–4)
ERYTHROCYTE [DISTWIDTH] IN BLOOD BY AUTOMATED COUNT: 28 % (ref 11.5–14.5)
GLOBULIN SER-MCNC: 6.1 G/DL (ref 2–4)
GLUCOSE SERPL-MCNC: 107 MG/DL (ref 74–106)
GLUCOSE SERPL-MCNC: 113 MG/DL (ref 70–105)
GLUCOSE SERPL-MCNC: 126 MG/DL (ref 70–105)
GLUCOSE SERPL-MCNC: 72 MG/DL (ref 70–105)
GLUCOSE SERPL-MCNC: 74 MG/DL (ref 70–105)
HCT VFR BLD AUTO: 30.1 % (ref 38–47)
HGB BLD-MCNC: 9.1 G/DL (ref 12–16)
HYPOCHROMIA BLD QL SMEAR: SLIGHT
IMM GRANULOCYTES # BLD AUTO: 0.15 X10E3/UL (ref 0–0.04)
IMM GRANULOCYTES NFR BLD: 1.5 % (ref 0–0.4)
LYMPHOCYTES # BLD AUTO: 1.77 X10E3/UL (ref 1–4.8)
LYMPHOCYTES NFR BLD AUTO: 17.7 % (ref 27–41)
MAGNESIUM SERPL-MCNC: 1.7 MG/DL (ref 1.7–2.3)
MCH RBC QN AUTO: 22 PG (ref 27–31)
MCHC RBC AUTO-ENTMCNC: 30.2 G/DL (ref 32–36)
MCV RBC AUTO: 72.9 FL (ref 80–96)
MICROCYTES BLD QL SMEAR: ABNORMAL
MONOCYTES # BLD AUTO: 0.6 X10E3/UL (ref 0–0.8)
MONOCYTES NFR BLD AUTO: 6 % (ref 2–6)
MPC BLD CALC-MCNC: 9.5 FL (ref 9.4–12.4)
NEUTROPHILS # BLD AUTO: 7.4 X10E3/UL (ref 1.8–7.7)
NEUTROPHILS NFR BLD AUTO: 73.9 % (ref 53–65)
NRBC # BLD AUTO: 0 X10E3/UL (ref 0–0)
NRBC, AUTO (.00): 0 /100 (ref 0–0)
OVALOCYTES BLD QL SMEAR: ABNORMAL
PHOSPHATE SERPL-MCNC: 3.4 MG/DL (ref 2.5–4.5)
PLATELET # BLD AUTO: 572 X10E3/UL (ref 150–400)
PLATELET MORPHOLOGY: ABNORMAL
POLYCHROMASIA BLD QL SMEAR: ABNORMAL
POTASSIUM SERPL-SCNC: 3.4 MMOL/L (ref 3.5–5.1)
PROT SERPL-MCNC: 7.3 G/DL (ref 6.4–8.2)
RBC # BLD AUTO: 4.13 X10E6/UL (ref 4.2–5.4)
SODIUM SERPL-SCNC: 143 MMOL/L (ref 136–145)
TARGETS BLD QL SMEAR: ABNORMAL
WBC # BLD AUTO: 10.01 X10E3/UL (ref 4.5–11)

## 2021-02-12 ENCOUNTER — HISTORICAL (OUTPATIENT)
Dept: ADMINISTRATIVE | Facility: HOSPITAL | Age: 36
End: 2021-02-12

## 2021-02-12 LAB
ANION GAP SERPL CALCULATED.3IONS-SCNC: 16 MMOL/L
BUN SERPL-MCNC: 31 MG/DL (ref 7–18)
CALCIUM SERPL-MCNC: 8.7 MG/DL (ref 8.5–10.1)
CHLORIDE SERPL-SCNC: 106 MMOL/L (ref 98–107)
CO2 SERPL-SCNC: 21 MMOL/L (ref 21–32)
CREAT SERPL-MCNC: 1.94 MG/DL (ref 0.55–1.02)
CRP SERPL-MCNC: 0.6 UG/ML (ref 0–0.8)
GLUCOSE SERPL-MCNC: 142 MG/DL (ref 70–105)
GLUCOSE SERPL-MCNC: 142 MG/DL (ref 74–106)
GLUCOSE SERPL-MCNC: 152 MG/DL (ref 70–105)
GLUCOSE SERPL-MCNC: 72 MG/DL (ref 70–105)
HCT VFR BLD AUTO: 34.8 % (ref 38–47)
HGB BLD-MCNC: 10.4 G/DL (ref 12–16)
PLATELET # BLD AUTO: 543 X10E3/UL (ref 150–400)
POTASSIUM SERPL-SCNC: 3.8 MMOL/L (ref 3.5–5.1)
SODIUM SERPL-SCNC: 139 MMOL/L (ref 136–145)

## 2021-02-13 ENCOUNTER — HISTORICAL (OUTPATIENT)
Dept: ADMINISTRATIVE | Facility: HOSPITAL | Age: 36
End: 2021-02-13

## 2021-02-13 LAB
GLUCOSE SERPL-MCNC: 105 MG/DL (ref 70–105)
GLUCOSE SERPL-MCNC: 162 MG/DL (ref 70–105)
GLUCOSE SERPL-MCNC: 169 MG/DL (ref 70–105)
GLUCOSE SERPL-MCNC: 209 MG/DL (ref 70–105)

## 2021-02-14 ENCOUNTER — HISTORICAL (OUTPATIENT)
Dept: ADMINISTRATIVE | Facility: HOSPITAL | Age: 36
End: 2021-02-14

## 2021-02-14 LAB
GLUCOSE SERPL-MCNC: 106 MG/DL (ref 70–105)
GLUCOSE SERPL-MCNC: 144 MG/DL (ref 70–105)
GLUCOSE SERPL-MCNC: 196 MG/DL (ref 70–105)
GLUCOSE SERPL-MCNC: 207 MG/DL (ref 70–105)

## 2021-02-15 ENCOUNTER — HISTORICAL (OUTPATIENT)
Dept: ADMINISTRATIVE | Facility: HOSPITAL | Age: 36
End: 2021-02-15

## 2021-02-15 LAB
GLUCOSE SERPL-MCNC: 136 MG/DL (ref 70–105)
GLUCOSE SERPL-MCNC: 136 MG/DL (ref 70–105)
GLUCOSE SERPL-MCNC: 155 MG/DL (ref 70–105)
GLUCOSE SERPL-MCNC: 244 MG/DL (ref 70–105)

## 2021-02-16 ENCOUNTER — HISTORICAL (OUTPATIENT)
Dept: ADMINISTRATIVE | Facility: HOSPITAL | Age: 36
End: 2021-02-16

## 2021-02-16 LAB
ANION GAP SERPL CALCULATED.3IONS-SCNC: 21 MMOL/L
ANISOCYTOSIS BLD QL SMEAR: ABNORMAL
BASOPHILS # BLD AUTO: 0.05 X10E3/UL (ref 0–0.2)
BASOPHILS NFR BLD AUTO: 0.4 % (ref 0–1)
BUN SERPL-MCNC: 60 MG/DL (ref 7–18)
CALCIUM SERPL-MCNC: 8.4 MG/DL (ref 8.5–10.1)
CHLORIDE SERPL-SCNC: 99 MMOL/L (ref 98–107)
CO2 SERPL-SCNC: 13 MMOL/L (ref 21–32)
CREAT SERPL-MCNC: 4.41 MG/DL (ref 0.55–1.02)
CRENATED CELLS: ABNORMAL
EOSINOPHIL # BLD AUTO: 0.08 X10E3/UL (ref 0–0.5)
EOSINOPHIL NFR BLD AUTO: 0.6 % (ref 1–4)
ERYTHROCYTE [DISTWIDTH] IN BLOOD BY AUTOMATED COUNT: 28.3 % (ref 11.5–14.5)
GLUCOSE SERPL-MCNC: 141 MG/DL (ref 70–105)
GLUCOSE SERPL-MCNC: 151 MG/DL (ref 70–105)
GLUCOSE SERPL-MCNC: 159 MG/DL (ref 70–105)
GLUCOSE SERPL-MCNC: 180 MG/DL (ref 70–105)
GLUCOSE SERPL-MCNC: 210 MG/DL (ref 74–106)
HCT VFR BLD AUTO: 31.7 % (ref 38–47)
HGB BLD-MCNC: 9.6 G/DL (ref 12–16)
IMM GRANULOCYTES # BLD AUTO: 0.18 X10E3/UL (ref 0–0.04)
IMM GRANULOCYTES NFR BLD: 1.4 % (ref 0–0.4)
LYMPHOCYTES # BLD AUTO: 1.79 X10E3/UL (ref 1–4.8)
LYMPHOCYTES NFR BLD AUTO: 13.9 % (ref 27–41)
MCH RBC QN AUTO: 22.5 PG (ref 27–31)
MCHC RBC AUTO-ENTMCNC: 30.3 G/DL (ref 32–36)
MCV RBC AUTO: 74.4 FL (ref 80–96)
MICROCYTES BLD QL SMEAR: ABNORMAL
MONOCYTES # BLD AUTO: 0.79 X10E3/UL (ref 0–0.8)
MONOCYTES NFR BLD AUTO: 6.1 % (ref 2–6)
MPC BLD CALC-MCNC: 10.4 FL (ref 9.4–12.4)
NEUTROPHILS # BLD AUTO: 9.99 X10E3/UL (ref 1.8–7.7)
NEUTROPHILS NFR BLD AUTO: 77.6 % (ref 53–65)
NRBC # BLD AUTO: 0 X10E3/UL (ref 0–0)
NRBC, AUTO (.00): 0 /100 (ref 0–0)
OVALOCYTES BLD QL SMEAR: ABNORMAL
PLATELET # BLD AUTO: 464 X10E3/UL (ref 150–400)
PLATELET MORPHOLOGY: ABNORMAL
POTASSIUM SERPL-SCNC: 4.8 MMOL/L (ref 3.5–5.1)
RBC # BLD AUTO: 4.26 X10E6/UL (ref 4.2–5.4)
SODIUM SERPL-SCNC: 128 MMOL/L (ref 136–145)
TARGETS BLD QL SMEAR: ABNORMAL
WBC # BLD AUTO: 12.88 X10E3/UL (ref 4.5–11)

## 2021-02-17 ENCOUNTER — HISTORICAL (OUTPATIENT)
Dept: ADMINISTRATIVE | Facility: HOSPITAL | Age: 36
End: 2021-02-17

## 2021-02-17 LAB
ANION GAP SERPL CALCULATED.3IONS-SCNC: 18 MMOL/L
BUN SERPL-MCNC: 60 MG/DL (ref 7–18)
CALCIUM SERPL-MCNC: 8.3 MG/DL (ref 8.5–10.1)
CHLORIDE SERPL-SCNC: 103 MMOL/L (ref 98–107)
CO2 SERPL-SCNC: 15 MMOL/L (ref 21–32)
CREAT SERPL-MCNC: 4.02 MG/DL (ref 0.55–1.02)
GLUCOSE SERPL-MCNC: 103 MG/DL (ref 70–105)
GLUCOSE SERPL-MCNC: 125 MG/DL (ref 70–105)
GLUCOSE SERPL-MCNC: 181 MG/DL (ref 70–105)
GLUCOSE SERPL-MCNC: 89 MG/DL (ref 70–105)
GLUCOSE SERPL-MCNC: 96 MG/DL (ref 74–106)
POTASSIUM SERPL-SCNC: 4.6 MMOL/L (ref 3.5–5.1)
SODIUM SERPL-SCNC: 131 MMOL/L (ref 136–145)

## 2021-02-18 ENCOUNTER — HISTORICAL (OUTPATIENT)
Dept: ADMINISTRATIVE | Facility: HOSPITAL | Age: 36
End: 2021-02-18

## 2021-02-18 LAB
ANION GAP SERPL CALCULATED.3IONS-SCNC: 17 MMOL/L
BUN SERPL-MCNC: 84 MG/DL (ref 7–18)
CALCIUM SERPL-MCNC: 8.4 MG/DL (ref 8.5–10.1)
CHLORIDE SERPL-SCNC: 102 MMOL/L (ref 98–107)
CO2 SERPL-SCNC: 17 MMOL/L (ref 21–32)
CREAT SERPL-MCNC: 3.26 MG/DL (ref 0.55–1.02)
GLUCOSE SERPL-MCNC: 130 MG/DL (ref 70–105)
GLUCOSE SERPL-MCNC: 161 MG/DL (ref 70–105)
GLUCOSE SERPL-MCNC: 208 MG/DL (ref 70–105)
GLUCOSE SERPL-MCNC: 99 MG/DL (ref 74–106)
HBV SURFACE AG SERPL QL IA: NORMAL
HCT VFR BLD AUTO: 28.6 % (ref 38–47)
HCV AB SER QL: NORMAL
HGB BLD-MCNC: 8.7 G/DL (ref 12–16)
POTASSIUM SERPL-SCNC: 5 MMOL/L (ref 3.5–5.1)
SODIUM SERPL-SCNC: 131 MMOL/L (ref 136–145)
SYPHILIS AB INTERPRETATION: NORMAL

## 2021-02-19 ENCOUNTER — HISTORICAL (OUTPATIENT)
Dept: ADMINISTRATIVE | Facility: HOSPITAL | Age: 36
End: 2021-02-19

## 2021-02-19 LAB
GLUCOSE SERPL-MCNC: 131 MG/DL (ref 70–105)
GLUCOSE SERPL-MCNC: 171 MG/DL (ref 70–105)
GLUCOSE SERPL-MCNC: 209 MG/DL (ref 70–105)
GLUCOSE SERPL-MCNC: 306 MG/DL (ref 70–105)

## 2021-02-20 ENCOUNTER — HISTORICAL (OUTPATIENT)
Dept: ADMINISTRATIVE | Facility: HOSPITAL | Age: 36
End: 2021-02-20

## 2021-02-20 LAB
GLUCOSE SERPL-MCNC: 132 MG/DL (ref 70–105)
GLUCOSE SERPL-MCNC: 214 MG/DL (ref 70–105)
GLUCOSE SERPL-MCNC: 302 MG/DL (ref 70–105)
GLUCOSE SERPL-MCNC: 311 MG/DL (ref 70–105)

## 2021-02-21 ENCOUNTER — HISTORICAL (OUTPATIENT)
Dept: ADMINISTRATIVE | Facility: HOSPITAL | Age: 36
End: 2021-02-21

## 2021-02-21 LAB
ACANTHOCYTES BLD QL SMEAR: ABNORMAL
ANION GAP SERPL CALCULATED.3IONS-SCNC: 18 MMOL/L
ANISOCYTOSIS BLD QL SMEAR: ABNORMAL
BASOPHILS # BLD AUTO: 0.05 X10E3/UL (ref 0–0.2)
BASOPHILS NFR BLD AUTO: 0.5 % (ref 0–1)
BUN SERPL-MCNC: 84 MG/DL (ref 7–18)
CALCIUM SERPL-MCNC: 8.4 MG/DL (ref 8.5–10.1)
CHLORIDE SERPL-SCNC: 103 MMOL/L (ref 98–107)
CO2 SERPL-SCNC: 16 MMOL/L (ref 21–32)
CREAT SERPL-MCNC: 2.39 MG/DL (ref 0.55–1.02)
CRENATED CELLS: ABNORMAL
EOSINOPHIL # BLD AUTO: 0.13 X10E3/UL (ref 0–0.5)
EOSINOPHIL NFR BLD AUTO: 1.2 % (ref 1–4)
ERYTHROCYTE [DISTWIDTH] IN BLOOD BY AUTOMATED COUNT: 28.7 % (ref 11.5–14.5)
GLUCOSE SERPL-MCNC: 112 MG/DL (ref 70–105)
GLUCOSE SERPL-MCNC: 128 MG/DL (ref 74–106)
HCT VFR BLD AUTO: 32 % (ref 38–47)
HGB BLD-MCNC: 9.8 G/DL (ref 12–16)
HYPOCHROMIA BLD QL SMEAR: ABNORMAL
IMM GRANULOCYTES # BLD AUTO: 0.08 X10E3/UL (ref 0–0.04)
IMM GRANULOCYTES NFR BLD: 0.7 % (ref 0–0.4)
LYMPHOCYTES # BLD AUTO: 2.91 X10E3/UL (ref 1–4.8)
LYMPHOCYTES NFR BLD AUTO: 26.7 % (ref 27–41)
MCH RBC QN AUTO: 22.5 PG (ref 27–31)
MCHC RBC AUTO-ENTMCNC: 30.6 G/DL (ref 32–36)
MCV RBC AUTO: 73.6 FL (ref 80–96)
MICROCYTES BLD QL SMEAR: ABNORMAL
MONOCYTES # BLD AUTO: 0.76 X10E3/UL (ref 0–0.8)
MONOCYTES NFR BLD AUTO: 7 % (ref 2–6)
MPC BLD CALC-MCNC: 10.8 FL (ref 9.4–12.4)
NEUTROPHILS # BLD AUTO: 6.98 X10E3/UL (ref 1.8–7.7)
NEUTROPHILS NFR BLD AUTO: 63.9 % (ref 53–65)
NRBC # BLD AUTO: 0 X10E3/UL (ref 0–0)
NRBC, AUTO (.00): 0 /100 (ref 0–0)
OVALOCYTES BLD QL SMEAR: ABNORMAL
PLATELET # BLD AUTO: 372 X10E3/UL (ref 150–400)
PLATELET MORPHOLOGY: ABNORMAL
POLYCHROMASIA BLD QL SMEAR: ABNORMAL
POTASSIUM SERPL-SCNC: 4.8 MMOL/L (ref 3.5–5.1)
RBC # BLD AUTO: 4.35 X10E6/UL (ref 4.2–5.4)
SODIUM SERPL-SCNC: 132 MMOL/L (ref 136–145)
TARGETS BLD QL SMEAR: ABNORMAL
WBC # BLD AUTO: 10.91 X10E3/UL (ref 4.5–11)

## 2021-02-26 ENCOUNTER — HISTORICAL (OUTPATIENT)
Dept: ADMINISTRATIVE | Facility: HOSPITAL | Age: 36
End: 2021-02-26

## 2021-02-26 LAB
ALBUMIN SERPL BCP-MCNC: 3 G/DL (ref 3.5–5)
ALBUMIN/GLOB SERPL: 0.4 {RATIO}
ALP SERPL-CCNC: 187 U/L (ref 37–98)
ALT SERPL W P-5'-P-CCNC: 21 U/L (ref 13–56)
ANION GAP SERPL CALCULATED.3IONS-SCNC: 22 MMOL/L
ANION GAP SERPL CALCULATED.3IONS-SCNC: 25 MMOL/L
ANION GAP SERPL CALCULATED.3IONS-SCNC: 25 MMOL/L
ANISOCYTOSIS BLD QL SMEAR: ABNORMAL
AST SERPL W P-5'-P-CCNC: 15 U/L (ref 15–37)
BACTERIA #/AREA URNS HPF: ABNORMAL /HPF
BASOPHILS # BLD AUTO: 0.04 X10E3/UL (ref 0–0.2)
BASOPHILS NFR BLD AUTO: 0.2 % (ref 0–1)
BILIRUB SERPL-MCNC: 0.2 MG/DL (ref 0–1.2)
BILIRUB UR QL STRIP: NEGATIVE MG/DL
BUN SERPL-MCNC: 121 MG/DL (ref 7–18)
BUN SERPL-MCNC: 122 MG/DL (ref 7–18)
BUN SERPL-MCNC: 123 MG/DL (ref 7–18)
BUN/CREAT SERPL: 18.3
CALCIUM SERPL-MCNC: 8.6 MG/DL (ref 8.5–10.1)
CALCIUM SERPL-MCNC: 8.7 MG/DL (ref 8.5–10.1)
CALCIUM SERPL-MCNC: 9.3 MG/DL (ref 8.5–10.1)
CHLORIDE SERPL-SCNC: 100 MMOL/L (ref 98–107)
CHLORIDE SERPL-SCNC: 90 MMOL/L (ref 98–107)
CHLORIDE SERPL-SCNC: 97 MMOL/L (ref 98–107)
CLARITY UR: CLEAR
CLARITY UR: CLEAR
CO2 SERPL-SCNC: 11 MMOL/L (ref 21–32)
CO2 SERPL-SCNC: 11 MMOL/L (ref 21–32)
CO2 SERPL-SCNC: 14 MMOL/L (ref 21–32)
COLOR UR: YELLOW
COLOR UR: YELLOW
CREAT SERPL-MCNC: 6.16 MG/DL (ref 0.55–1.02)
CREAT SERPL-MCNC: 6.6 MG/DL (ref 0.55–1.02)
CREAT SERPL-MCNC: 6.61 MG/DL (ref 0.55–1.02)
CREAT UR-MCNC: 240 MG/DL (ref 28–217)
EOSINOPHIL # BLD AUTO: 0.03 X10E3/UL (ref 0–0.5)
EOSINOPHIL NFR BLD AUTO: 0.2 % (ref 1–4)
ERYTHROCYTE [DISTWIDTH] IN BLOOD BY AUTOMATED COUNT: 28.4 % (ref 11.5–14.5)
FLUAV AG UPPER RESP QL IA.RAPID: NEGATIVE
FLUBV AG UPPER RESP QL IA.RAPID: NEGATIVE
GLOBULIN SER-MCNC: 7.7 G/DL (ref 2–4)
GLUCOSE SERPL-MCNC: 107 MG/DL (ref 70–105)
GLUCOSE SERPL-MCNC: 107 MG/DL (ref 70–105)
GLUCOSE SERPL-MCNC: 134 MG/DL (ref 70–105)
GLUCOSE SERPL-MCNC: 144 MG/DL (ref 74–106)
GLUCOSE SERPL-MCNC: 163 MG/DL (ref 70–105)
GLUCOSE SERPL-MCNC: 230 MG/DL (ref 74–106)
GLUCOSE SERPL-MCNC: 338 MG/DL (ref 70–105)
GLUCOSE SERPL-MCNC: 366 MG/DL (ref 70–105)
GLUCOSE SERPL-MCNC: 533 MG/DL (ref 74–106)
GLUCOSE SERPL-MCNC: 87 MG/DL (ref 70–105)
GLUCOSE UR STRIP-MCNC: NEGATIVE MG/DL
HCT VFR BLD AUTO: 37.3 % (ref 38–47)
HGB BLD-MCNC: 11.6 G/DL (ref 12–16)
IMM GRANULOCYTES # BLD AUTO: 0.12 X10E3/UL (ref 0–0.04)
IMM GRANULOCYTES NFR BLD: 0.7 % (ref 0–0.4)
KETONES UR STRIP-SCNC: NEGATIVE MG/DL
LACTATE SERPL-SCNC: 2 MMOL/L (ref 0.4–2)
LACTATE SERPL-SCNC: 2.6 MMOL/L (ref 0.4–2)
LEUKOCYTE ESTERASE UR QL STRIP: ABNORMAL LEU/UL
LYMPHOCYTES # BLD AUTO: 0.91 X10E3/UL (ref 1–4.8)
LYMPHOCYTES NFR BLD AUTO: 5.4 % (ref 27–41)
MCH RBC QN AUTO: 22.7 PG (ref 27–31)
MCHC RBC AUTO-ENTMCNC: 31.1 G/DL (ref 32–36)
MCV RBC AUTO: 73.1 FL (ref 80–96)
MICROCYTES BLD QL SMEAR: ABNORMAL
MONOCYTES # BLD AUTO: 0.58 X10E3/UL (ref 0–0.8)
MONOCYTES NFR BLD AUTO: 3.5 % (ref 2–6)
MPC BLD CALC-MCNC: 10.9 FL (ref 9.4–12.4)
MUCOUS THREADS #/AREA URNS HPF: ABNORMAL /HPF
NEUTROPHILS # BLD AUTO: 15.06 X10E3/UL (ref 1.8–7.7)
NEUTROPHILS NFR BLD AUTO: 90 % (ref 53–65)
NITRITE UR QL STRIP: NEGATIVE
NRBC # BLD AUTO: 0 X10E3/UL (ref 0–0)
NRBC, AUTO (.00): 0 /100 (ref 0–0)
OVALOCYTES BLD QL SMEAR: ABNORMAL
PH UR STRIP: 5 [PH] (ref 5–8)
PLATELET # BLD AUTO: 418 X10E3/UL (ref 150–400)
PLATELET MORPHOLOGY: ABNORMAL
POC BASE EXCESS: -20 MMOL/L (ref -2–3)
POC HCO3 VENOUS: 9 MMOL/L (ref 24–28)
POC PCO2 VENOUS: 28 MM HG (ref 41–51)
POC PH VENOUS: 7.09 PH UNITS (ref 7.32–7.42)
POC PO2 VENOUS: 81 MM HG (ref 25–40)
POC SATURATED O2 VENOUS: 90 % (ref 40–70)
POLYCHROMASIA BLD QL SMEAR: ABNORMAL
POTASSIUM SERPL-SCNC: 5.4 MMOL/L (ref 3.5–5.1)
POTASSIUM SERPL-SCNC: 5.9 MMOL/L (ref 3.5–5.1)
POTASSIUM SERPL-SCNC: 7.1 MMOL/L (ref 3.5–5.1)
PROT SERPL-MCNC: 10.7 G/DL (ref 6.4–8.2)
PROT SERPL-MCNC: 308.7 MG/DL (ref 0–11.9)
PROT UR QL STRIP: >=300 MG/DL
RBC # BLD AUTO: 5.1 X10E6/UL (ref 4.2–5.4)
RBC # UR STRIP: ABNORMAL ERY/UL
RBC #/AREA URNS HPF: ABNORMAL /HPF (ref 0–3)
SARS-COV+SARS-COV-2 AG RESP QL IA.RAPID: NEGATIVE
SODIUM SERPL-SCNC: 119 MMOL/L (ref 136–145)
SODIUM SERPL-SCNC: 127 MMOL/L (ref 136–145)
SODIUM SERPL-SCNC: 131 MMOL/L (ref 136–145)
SP GR UR STRIP: >=1.03 (ref 1–1.03)
SPHEROCYTES BLD QL SMEAR: ABNORMAL
SQUAMOUS #/AREA URNS LPF: ABNORMAL /LPF
TROPONIN I SERPL-MCNC: <0.017 NG/ML (ref 0–0.06)
UROBILINOGEN UR STRIP-ACNC: 0.2 EU/DL
WBC # BLD AUTO: 16.74 X10E3/UL (ref 4.5–11)
WBC #/AREA URNS HPF: ABNORMAL /HPF (ref 0–5)
YEAST #/AREA URNS HPF: ABNORMAL /HPF

## 2021-02-26 PROCEDURE — 71045 X-RAY EXAM CHEST 1 VIEW: CPT

## 2021-02-26 PROCEDURE — 36415 COLL VENOUS BLD VENIPUNCTURE: CPT

## 2021-02-26 PROCEDURE — 87428 SARSCOV & INF VIR A&B AG IA: CPT

## 2021-02-26 PROCEDURE — 96375 TX/PRO/DX INJ NEW DRUG ADDON: CPT

## 2021-02-26 PROCEDURE — 9928525 CHARGE CONVERSION

## 2021-02-26 PROCEDURE — 82803 BLOOD GASES ANY COMBINATION: CPT

## 2021-02-26 PROCEDURE — 99990 CHARGE CONVERSION: CPT

## 2021-02-26 PROCEDURE — 83605 ASSAY OF LACTIC ACID: CPT

## 2021-02-26 PROCEDURE — 82570 ASSAY OF URINE CREATININE: CPT

## 2021-02-26 PROCEDURE — 87040 BLOOD CULTURE FOR BACTERIA: CPT

## 2021-02-26 PROCEDURE — 85025 COMPLETE CBC W/AUTO DIFF WBC: CPT

## 2021-02-26 PROCEDURE — 87086 URINE CULTURE/COLONY COUNT: CPT

## 2021-02-26 PROCEDURE — 96361 HYDRATE IV INFUSION ADD-ON: CPT

## 2021-02-26 PROCEDURE — 96361 HYDRATE IV INFUSION ADD-ON: CPT | Performed by: STUDENT IN AN ORGANIZED HEALTH CARE EDUCATION/TRAINING PROGRAM

## 2021-02-26 PROCEDURE — 99990 CHARGE CONVERSION: CPT | Performed by: STUDENT IN AN ORGANIZED HEALTH CARE EDUCATION/TRAINING PROGRAM

## 2021-02-26 PROCEDURE — 96374 THER/PROPH/DIAG INJ IV PUSH: CPT

## 2021-02-26 PROCEDURE — 76775 US EXAM ABDO BACK WALL LIM: CPT

## 2021-02-26 PROCEDURE — 84484 ASSAY OF TROPONIN QUANT: CPT

## 2021-02-26 PROCEDURE — 81001 URINALYSIS AUTO W/SCOPE: CPT

## 2021-02-26 PROCEDURE — 80053 COMPREHEN METABOLIC PANEL: CPT

## 2021-02-26 PROCEDURE — 80048 BASIC METABOLIC PNL TOTAL CA: CPT

## 2021-02-26 PROCEDURE — 99218 CHARGE CONVERSION: CPT

## 2021-02-26 PROCEDURE — 93005 ELECTROCARDIOGRAM TRACING: CPT

## 2021-02-26 PROCEDURE — 82962 GLUCOSE BLOOD TEST: CPT

## 2021-02-26 PROCEDURE — 51702 INSERT TEMP BLADDER CATH: CPT

## 2021-02-26 PROCEDURE — 84156 ASSAY OF PROTEIN URINE: CPT

## 2021-02-27 ENCOUNTER — HISTORICAL (OUTPATIENT)
Dept: ADMINISTRATIVE | Facility: HOSPITAL | Age: 36
End: 2021-02-27

## 2021-02-27 ENCOUNTER — HOSPITAL ENCOUNTER (INPATIENT)
Facility: HOSPITAL | Age: 36
LOS: 16 days | Discharge: HOME HEALTH | DRG: 638 | End: 2021-03-15
Attending: HOSPITALIST | Admitting: HOSPITALIST
Payer: MEDICAID

## 2021-02-27 LAB
ANION GAP SERPL CALCULATED.3IONS-SCNC: 21 MMOL/L
ANION GAP SERPL CALCULATED.3IONS-SCNC: 22 MMOL/L
ANION GAP SERPL CALCULATED.3IONS-SCNC: 24 MMOL/L
BUN SERPL-MCNC: 107 MG/DL (ref 7–18)
BUN SERPL-MCNC: 116 MG/DL (ref 7–18)
BUN SERPL-MCNC: 120 MG/DL (ref 7–18)
BUN SERPL-MCNC: 89 MG/DL (ref 7–18)
BUN SERPL-MCNC: 98 MG/DL (ref 7–18)
CALCIUM SERPL-MCNC: 7.9 MG/DL (ref 8.5–10.1)
CALCIUM SERPL-MCNC: 7.9 MG/DL (ref 8.5–10.1)
CALCIUM SERPL-MCNC: 8.2 MG/DL (ref 8.5–10.1)
CALCIUM SERPL-MCNC: 8.3 MG/DL (ref 8.5–10.1)
CALCIUM SERPL-MCNC: 8.3 MG/DL (ref 8.5–10.1)
CHLORIDE SERPL-SCNC: 103 MMOL/L (ref 98–107)
CHLORIDE SERPL-SCNC: 106 MMOL/L (ref 98–107)
CHLORIDE SERPL-SCNC: 109 MMOL/L (ref 98–107)
CHLORIDE SERPL-SCNC: 109 MMOL/L (ref 98–107)
CHLORIDE SERPL-SCNC: 112 MMOL/L (ref 98–107)
CHLORIDE SERPL-SCNC: 34 MMOL/L/24HR (ref 110–250)
CO2 SERPL-SCNC: 11 MMOL/L (ref 21–32)
CO2 SERPL-SCNC: 12 MMOL/L (ref 21–32)
CO2 SERPL-SCNC: 12 MMOL/L (ref 21–32)
CO2 SERPL-SCNC: 13 MMOL/L (ref 21–32)
CO2 SERPL-SCNC: 13 MMOL/L (ref 21–32)
CREAT SERPL-MCNC: 3.57 MG/DL (ref 0.55–1.02)
CREAT SERPL-MCNC: 4.15 MG/DL (ref 0.55–1.02)
CREAT SERPL-MCNC: 4.56 MG/DL (ref 0.55–1.02)
CREAT SERPL-MCNC: 5.57 MG/DL (ref 0.55–1.02)
CREAT SERPL-MCNC: 5.91 MG/DL (ref 0.55–1.02)
GLUCOSE SERPL-MCNC: 107 MG/DL (ref 70–105)
GLUCOSE SERPL-MCNC: 113 MG/DL (ref 70–105)
GLUCOSE SERPL-MCNC: 117 MG/DL (ref 70–105)
GLUCOSE SERPL-MCNC: 119 MG/DL (ref 70–105)
GLUCOSE SERPL-MCNC: 126 MG/DL (ref 70–105)
GLUCOSE SERPL-MCNC: 133 MG/DL (ref 70–105)
GLUCOSE SERPL-MCNC: 135 MG/DL (ref 70–105)
GLUCOSE SERPL-MCNC: 140 MG/DL (ref 74–106)
GLUCOSE SERPL-MCNC: 151 MG/DL (ref 70–105)
GLUCOSE SERPL-MCNC: 157 MG/DL (ref 70–105)
GLUCOSE SERPL-MCNC: 159 MG/DL (ref 70–105)
GLUCOSE SERPL-MCNC: 166 MG/DL (ref 70–105)
GLUCOSE SERPL-MCNC: 168 MG/DL (ref 70–105)
GLUCOSE SERPL-MCNC: 174 MG/DL (ref 74–106)
GLUCOSE SERPL-MCNC: 191 MG/DL (ref 70–105)
GLUCOSE SERPL-MCNC: 194 MG/DL (ref 74–106)
GLUCOSE SERPL-MCNC: 197 MG/DL (ref 70–105)
GLUCOSE SERPL-MCNC: 205 MG/DL (ref 70–105)
GLUCOSE SERPL-MCNC: 206 MG/DL (ref 70–105)
GLUCOSE SERPL-MCNC: 221 MG/DL (ref 70–105)
GLUCOSE SERPL-MCNC: 238 MG/DL (ref 70–105)
GLUCOSE SERPL-MCNC: 247 MG/DL (ref 74–106)
GLUCOSE SERPL-MCNC: 70 MG/DL (ref 70–105)
GLUCOSE SERPL-MCNC: 77 MG/DL (ref 70–105)
GLUCOSE SERPL-MCNC: 78 MG/DL (ref 74–106)
GLUCOSE SERPL-MCNC: 84 MG/DL (ref 70–105)
GLUCOSE SERPL-MCNC: 99 MG/DL (ref 70–105)
MAGNESIUM SERPL-MCNC: 0.7 MG/DL (ref 1.7–2.3)
MAGNESIUM SERPL-MCNC: 1.3 MG/DL (ref 1.7–2.3)
MAGNESIUM SERPL-MCNC: 1.7 MG/DL (ref 1.7–2.3)
PHOSPHATE SERPL-MCNC: 3.7 MG/DL (ref 2.5–4.5)
PHOSPHATE SERPL-MCNC: 4.7 MG/DL (ref 2.5–4.5)
PHOSPHATE SERPL-MCNC: 5.3 MG/DL (ref 2.5–4.5)
POTASSIUM SERPL-SCNC: 27 MMOL/L/24HR (ref 25–125)
POTASSIUM SERPL-SCNC: 4.1 MMOL/L (ref 3.5–5.1)
POTASSIUM SERPL-SCNC: 4.8 MMOL/L (ref 3.5–5.1)
POTASSIUM SERPL-SCNC: 5 MMOL/L (ref 3.5–5.1)
POTASSIUM SERPL-SCNC: 5 MMOL/L (ref 3.5–5.1)
POTASSIUM SERPL-SCNC: 5.2 MMOL/L (ref 3.5–5.1)
SODIUM SERPL-SCNC: 132 MMOL/L (ref 136–145)
SODIUM SERPL-SCNC: 137 MMOL/L (ref 136–145)
SODIUM SERPL-SCNC: 142 MMOL/L (ref 136–145)
SODIUM UR-SCNC: 26 MMOL/L/24HR (ref 40–220)

## 2021-02-27 PROCEDURE — 83735 ASSAY OF MAGNESIUM: CPT

## 2021-02-27 PROCEDURE — 82962 GLUCOSE BLOOD TEST: CPT

## 2021-02-27 PROCEDURE — 97165GO CHARGE CONVERSION

## 2021-02-27 PROCEDURE — 84300 ASSAY OF URINE SODIUM: CPT

## 2021-02-27 PROCEDURE — 84100 ASSAY OF PHOSPHORUS: CPT

## 2021-02-27 PROCEDURE — 80048 BASIC METABOLIC PNL TOTAL CA: CPT

## 2021-02-27 PROCEDURE — 97161GP CHARGE CONVERSION

## 2021-02-27 PROCEDURE — 99990 CHARGE CONVERSION: CPT

## 2021-02-27 PROCEDURE — 36415 COLL VENOUS BLD VENIPUNCTURE: CPT

## 2021-02-27 PROCEDURE — 82436 ASSAY OF URINE CHLORIDE: CPT

## 2021-02-27 PROCEDURE — 84133 ASSAY OF URINE POTASSIUM: CPT

## 2021-02-28 ENCOUNTER — HISTORICAL (OUTPATIENT)
Dept: ADMINISTRATIVE | Facility: HOSPITAL | Age: 36
End: 2021-02-28

## 2021-02-28 LAB
ANION GAP SERPL CALCULATED.3IONS-SCNC: 19 MMOL/L
ANION GAP SERPL CALCULATED.3IONS-SCNC: 20 MMOL/L
ANION GAP SERPL CALCULATED.3IONS-SCNC: 21 MMOL/L
BUN SERPL-MCNC: 48 MG/DL (ref 7–18)
BUN SERPL-MCNC: 54 MG/DL (ref 7–18)
BUN SERPL-MCNC: 66 MG/DL (ref 7–18)
BUN SERPL-MCNC: 69 MG/DL (ref 7–18)
BUN SERPL-MCNC: 73 MG/DL (ref 7–18)
CALCIUM SERPL-MCNC: 8.1 MG/DL (ref 8.5–10.1)
CALCIUM SERPL-MCNC: 8.1 MG/DL (ref 8.5–10.1)
CALCIUM SERPL-MCNC: 8.3 MG/DL (ref 8.5–10.1)
CALCIUM SERPL-MCNC: 8.3 MG/DL (ref 8.5–10.1)
CALCIUM SERPL-MCNC: 8.5 MG/DL (ref 8.5–10.1)
CHLORIDE SERPL-SCNC: 111 MMOL/L (ref 98–107)
CHLORIDE SERPL-SCNC: 114 MMOL/L (ref 98–107)
CHLORIDE SERPL-SCNC: 115 MMOL/L (ref 98–107)
CHLORIDE SERPL-SCNC: 118 MMOL/L (ref 98–107)
CHLORIDE SERPL-SCNC: 118 MMOL/L (ref 98–107)
CO2 SERPL-SCNC: 12 MMOL/L (ref 21–32)
CO2 SERPL-SCNC: 13 MMOL/L (ref 21–32)
CO2 SERPL-SCNC: 15 MMOL/L (ref 21–32)
CREAT SERPL-MCNC: 1.92 MG/DL (ref 0.55–1.02)
CREAT SERPL-MCNC: 1.95 MG/DL (ref 0.55–1.02)
CREAT SERPL-MCNC: 2.38 MG/DL (ref 0.55–1.02)
CREAT SERPL-MCNC: 2.47 MG/DL (ref 0.55–1.02)
CREAT SERPL-MCNC: 2.71 MG/DL (ref 0.55–1.02)
GLUCOSE SERPL-MCNC: 113 MG/DL (ref 70–105)
GLUCOSE SERPL-MCNC: 116 MG/DL (ref 70–105)
GLUCOSE SERPL-MCNC: 118 MG/DL (ref 70–105)
GLUCOSE SERPL-MCNC: 122 MG/DL (ref 70–105)
GLUCOSE SERPL-MCNC: 126 MG/DL (ref 74–106)
GLUCOSE SERPL-MCNC: 127 MG/DL (ref 70–105)
GLUCOSE SERPL-MCNC: 128 MG/DL (ref 70–105)
GLUCOSE SERPL-MCNC: 134 MG/DL (ref 74–106)
GLUCOSE SERPL-MCNC: 139 MG/DL (ref 70–105)
GLUCOSE SERPL-MCNC: 139 MG/DL (ref 70–105)
GLUCOSE SERPL-MCNC: 139 MG/DL (ref 74–106)
GLUCOSE SERPL-MCNC: 140 MG/DL (ref 70–105)
GLUCOSE SERPL-MCNC: 146 MG/DL (ref 70–105)
GLUCOSE SERPL-MCNC: 147 MG/DL (ref 70–105)
GLUCOSE SERPL-MCNC: 149 MG/DL (ref 74–106)
GLUCOSE SERPL-MCNC: 151 MG/DL (ref 74–106)
GLUCOSE SERPL-MCNC: 155 MG/DL (ref 70–105)
GLUCOSE SERPL-MCNC: 159 MG/DL (ref 70–105)
GLUCOSE SERPL-MCNC: 166 MG/DL (ref 70–105)
GLUCOSE SERPL-MCNC: 179 MG/DL (ref 70–105)
MAGNESIUM SERPL-MCNC: 1.3 MG/DL (ref 1.7–2.3)
MAGNESIUM SERPL-MCNC: 1.4 MG/DL (ref 1.7–2.3)
MAGNESIUM SERPL-MCNC: 1.5 MG/DL (ref 1.7–2.3)
MAGNESIUM SERPL-MCNC: 1.8 MG/DL (ref 1.7–2.3)
PHOSPHATE SERPL-MCNC: 2.4 MG/DL (ref 2.5–4.5)
PHOSPHATE SERPL-MCNC: 2.8 MG/DL (ref 2.5–4.5)
PHOSPHATE SERPL-MCNC: 3.5 MG/DL (ref 2.5–4.5)
PHOSPHATE SERPL-MCNC: 3.8 MG/DL (ref 2.5–4.5)
POTASSIUM SERPL-SCNC: 4.3 MMOL/L (ref 3.5–5.1)
POTASSIUM SERPL-SCNC: 4.7 MMOL/L (ref 3.5–5.1)
POTASSIUM SERPL-SCNC: 4.8 MMOL/L (ref 3.5–5.1)
POTASSIUM SERPL-SCNC: 5 MMOL/L (ref 3.5–5.1)
POTASSIUM SERPL-SCNC: 5 MMOL/L (ref 3.5–5.1)
REPORT: NORMAL
SODIUM SERPL-SCNC: 141 MMOL/L (ref 136–145)
SODIUM SERPL-SCNC: 141 MMOL/L (ref 136–145)
SODIUM SERPL-SCNC: 143 MMOL/L (ref 136–145)
SODIUM SERPL-SCNC: 145 MMOL/L (ref 136–145)
SODIUM SERPL-SCNC: 146 MMOL/L (ref 136–145)

## 2021-02-28 PROCEDURE — 36415 COLL VENOUS BLD VENIPUNCTURE: CPT

## 2021-02-28 PROCEDURE — 83735 ASSAY OF MAGNESIUM: CPT

## 2021-02-28 PROCEDURE — 80048 BASIC METABOLIC PNL TOTAL CA: CPT

## 2021-02-28 PROCEDURE — 84100 ASSAY OF PHOSPHORUS: CPT

## 2021-02-28 PROCEDURE — 99990 CHARGE CONVERSION: CPT

## 2021-02-28 PROCEDURE — 82962 GLUCOSE BLOOD TEST: CPT

## 2021-03-01 ENCOUNTER — HISTORICAL (OUTPATIENT)
Dept: ADMINISTRATIVE | Facility: HOSPITAL | Age: 36
End: 2021-03-01

## 2021-03-01 LAB
ANION GAP SERPL CALCULATED.3IONS-SCNC: 14 MMOL/L
ANISOCYTOSIS BLD QL SMEAR: ABNORMAL
BASOPHILS # BLD AUTO: 0.06 X10E3/UL (ref 0–0.2)
BASOPHILS NFR BLD AUTO: 0.8 % (ref 0–1)
BUN SERPL-MCNC: 36 MG/DL (ref 7–18)
CALCIUM SERPL-MCNC: 8.7 MG/DL (ref 8.5–10.1)
CHLORIDE SERPL-SCNC: 106 MMOL/L (ref 98–107)
CO2 SERPL-SCNC: 22 MMOL/L (ref 21–32)
CREAT SERPL-MCNC: 1.58 MG/DL (ref 0.55–1.02)
CRENATED CELLS: ABNORMAL
DACRYOCYTES BLD QL SMEAR: ABNORMAL
EOSINOPHIL # BLD AUTO: 0.57 X10E3/UL (ref 0–0.5)
EOSINOPHIL NFR BLD AUTO: 7.7 % (ref 1–4)
GLUCOSE SERPL-MCNC: 117 MG/DL (ref 74–106)
GLUCOSE SERPL-MCNC: 134 MG/DL (ref 70–105)
GLUCOSE SERPL-MCNC: 140 MG/DL (ref 70–105)
GLUCOSE SERPL-MCNC: 155 MG/DL (ref 70–105)
GLUCOSE SERPL-MCNC: 186 MG/DL (ref 70–105)
GLUCOSE SERPL-MCNC: 532 MG/DL (ref 70–105)
HCT VFR BLD AUTO: 27.3 % (ref 38–47)
HELMET CELLS BLD QL SMEAR: ABNORMAL
HGB BLD-MCNC: 8.6 G/DL (ref 12–16)
HYPOCHROMIA BLD QL SMEAR: ABNORMAL
IMM GRANULOCYTES # BLD AUTO: 0.03 X10E3/UL (ref 0–0.04)
IMM GRANULOCYTES NFR BLD: 0.4 % (ref 0–0.4)
LYMPHOCYTES # BLD AUTO: 2.23 X10E3/UL (ref 1–4.8)
LYMPHOCYTES NFR BLD AUTO: 29.9 % (ref 27–41)
MCH RBC QN AUTO: 22.9 PG (ref 27–31)
MCHC RBC AUTO-ENTMCNC: 31.5 G/DL (ref 32–36)
MCV RBC AUTO: 72.6 FL (ref 80–96)
MICROCYTES BLD QL SMEAR: ABNORMAL
MONOCYTES # BLD AUTO: 0.44 X10E3/UL (ref 0–0.8)
MONOCYTES NFR BLD AUTO: 5.9 % (ref 2–6)
MPC BLD CALC-MCNC: 10.1 FL (ref 9.4–12.4)
NEUTROPHILS # BLD AUTO: 4.12 X10E3/UL (ref 1.8–7.7)
NEUTROPHILS NFR BLD AUTO: 55.3 % (ref 53–65)
NRBC # BLD AUTO: 0 X10E3/UL (ref 0–0)
NRBC, AUTO (.00): 0 /100 (ref 0–0)
OVALOCYTES BLD QL SMEAR: ABNORMAL
PLATELET # BLD AUTO: 299 X10E3/UL (ref 150–400)
PLATELET MORPHOLOGY: ABNORMAL
POLYCHROMASIA BLD QL SMEAR: ABNORMAL
POTASSIUM SERPL-SCNC: 3.7 MMOL/L (ref 3.5–5.1)
RBC # BLD AUTO: 3.76 X10E6/UL (ref 4.2–5.4)
SODIUM SERPL-SCNC: 138 MMOL/L (ref 136–145)
TARGETS BLD QL SMEAR: ABNORMAL
WBC # BLD AUTO: 7.45 X10E3/UL (ref 4.5–11)

## 2021-03-01 PROCEDURE — 36415 COLL VENOUS BLD VENIPUNCTURE: CPT

## 2021-03-01 PROCEDURE — 80048 BASIC METABOLIC PNL TOTAL CA: CPT

## 2021-03-01 PROCEDURE — 85025 COMPLETE CBC W/AUTO DIFF WBC: CPT

## 2021-03-01 PROCEDURE — 97110GO CHARGE CONVERSION

## 2021-03-01 PROCEDURE — 82962 GLUCOSE BLOOD TEST: CPT

## 2021-03-01 PROCEDURE — 99990 CHARGE CONVERSION: CPT

## 2021-03-02 ENCOUNTER — HISTORICAL (OUTPATIENT)
Dept: ADMINISTRATIVE | Facility: HOSPITAL | Age: 36
End: 2021-03-02

## 2021-03-02 LAB
GLUCOSE SERPL-MCNC: 136 MG/DL (ref 70–105)
GLUCOSE SERPL-MCNC: 144 MG/DL (ref 70–105)
GLUCOSE SERPL-MCNC: 195 MG/DL (ref 70–105)
GLUCOSE SERPL-MCNC: 208 MG/DL (ref 70–105)

## 2021-03-02 PROCEDURE — 99990 CHARGE CONVERSION: CPT

## 2021-03-02 PROCEDURE — 82962 GLUCOSE BLOOD TEST: CPT

## 2021-03-03 ENCOUNTER — HISTORICAL (OUTPATIENT)
Dept: ADMINISTRATIVE | Facility: HOSPITAL | Age: 36
End: 2021-03-03

## 2021-03-03 LAB
ANION GAP SERPL CALCULATED.3IONS-SCNC: 18 MMOL/L
ANISOCYTOSIS BLD QL SMEAR: ABNORMAL
BASOPHILS # BLD AUTO: 0.05 X10E3/UL (ref 0–0.2)
BASOPHILS NFR BLD AUTO: 0.5 % (ref 0–1)
BUN SERPL-MCNC: 41 MG/DL (ref 7–18)
CALCIUM SERPL-MCNC: 8.9 MG/DL (ref 8.5–10.1)
CHLORIDE SERPL-SCNC: 96 MMOL/L (ref 98–107)
CO2 SERPL-SCNC: 18 MMOL/L (ref 21–32)
CREAT SERPL-MCNC: 3.48 MG/DL (ref 0.55–1.02)
CRENATED CELLS: ABNORMAL
CRP SERPL-MCNC: 1.2 UG/ML (ref 0–0.8)
DACRYOCYTES BLD QL SMEAR: ABNORMAL
EOSINOPHIL # BLD AUTO: 0.6 X10E3/UL (ref 0–0.5)
EOSINOPHIL NFR BLD AUTO: 6 % (ref 1–4)
GLUCOSE SERPL-MCNC: 110 MG/DL (ref 70–105)
GLUCOSE SERPL-MCNC: 138 MG/DL (ref 70–105)
GLUCOSE SERPL-MCNC: 145 MG/DL (ref 74–106)
GLUCOSE SERPL-MCNC: 260 MG/DL (ref 70–105)
GLUCOSE SERPL-MCNC: 264 MG/DL (ref 70–105)
HCT VFR BLD AUTO: 29.8 % (ref 38–47)
HELMET CELLS BLD QL SMEAR: ABNORMAL
HGB BLD-MCNC: 9.2 G/DL (ref 12–16)
HYPOCHROMIA BLD QL SMEAR: ABNORMAL
IMM GRANULOCYTES # BLD AUTO: 0.08 X10E3/UL (ref 0–0.04)
IMM GRANULOCYTES NFR BLD: 0.8 % (ref 0–0.4)
LYMPHOCYTES # BLD AUTO: 3 X10E3/UL (ref 1–4.8)
LYMPHOCYTES NFR BLD AUTO: 30 % (ref 27–41)
MCH RBC QN AUTO: 22.8 PG (ref 27–31)
MCHC RBC AUTO-ENTMCNC: 30.9 G/DL (ref 32–36)
MCV RBC AUTO: 73.9 FL (ref 80–96)
MICROCYTES BLD QL SMEAR: ABNORMAL
MONOCYTES # BLD AUTO: 0.68 X10E3/UL (ref 0–0.8)
MONOCYTES NFR BLD AUTO: 6.8 % (ref 2–6)
MPC BLD CALC-MCNC: 10.2 FL (ref 9.4–12.4)
NEUTROPHILS # BLD AUTO: 5.59 X10E3/UL (ref 1.8–7.7)
NEUTROPHILS NFR BLD AUTO: 55.9 % (ref 53–65)
NRBC # BLD AUTO: 0 X10E3/UL (ref 0–0)
NRBC, AUTO (.00): 0 /100 (ref 0–0)
OVALOCYTES BLD QL SMEAR: ABNORMAL
PLATELET # BLD AUTO: 300 X10E3/UL (ref 150–400)
PLATELET MORPHOLOGY: ABNORMAL
POLYCHROMASIA BLD QL SMEAR: ABNORMAL
POTASSIUM SERPL-SCNC: 4.4 MMOL/L (ref 3.5–5.1)
RBC # BLD AUTO: 4.03 X10E6/UL (ref 4.2–5.4)
SODIUM SERPL-SCNC: 128 MMOL/L (ref 136–145)
TARGETS BLD QL SMEAR: ABNORMAL
WBC # BLD AUTO: 10 X10E3/UL (ref 4.5–11)

## 2021-03-03 PROCEDURE — 82962 GLUCOSE BLOOD TEST: CPT

## 2021-03-03 PROCEDURE — 85025 COMPLETE CBC W/AUTO DIFF WBC: CPT

## 2021-03-03 PROCEDURE — 36415 COLL VENOUS BLD VENIPUNCTURE: CPT

## 2021-03-03 PROCEDURE — 80048 BASIC METABOLIC PNL TOTAL CA: CPT

## 2021-03-03 PROCEDURE — 87040 BLOOD CULTURE FOR BACTERIA: CPT

## 2021-03-03 PROCEDURE — 97535GO CHARGE CONVERSION

## 2021-03-03 PROCEDURE — 87641 MR-STAPH DNA AMP PROBE: CPT

## 2021-03-03 PROCEDURE — 86140 C-REACTIVE PROTEIN: CPT

## 2021-03-03 PROCEDURE — 97110GP CHARGE CONVERSION

## 2021-03-03 PROCEDURE — 99990 CHARGE CONVERSION: CPT

## 2021-03-03 PROCEDURE — 8714959 CHARGE CONVERSION

## 2021-03-03 PROCEDURE — 87186 SC STD MICRODIL/AGAR DIL: CPT

## 2021-03-03 PROCEDURE — 87149 DNA/RNA DIRECT PROBE: CPT

## 2021-03-04 ENCOUNTER — HISTORICAL (OUTPATIENT)
Dept: ADMINISTRATIVE | Facility: HOSPITAL | Age: 36
End: 2021-03-04

## 2021-03-04 LAB
ANION GAP SERPL CALCULATED.3IONS-SCNC: 19 MMOL/L
ANISOCYTOSIS BLD QL SMEAR: ABNORMAL
BASOPHILS # BLD AUTO: 0.04 X10E3/UL (ref 0–0.2)
BASOPHILS NFR BLD AUTO: 0.5 % (ref 0–1)
BUN SERPL-MCNC: 49 MG/DL (ref 7–18)
CALCIUM SERPL-MCNC: 9.2 MG/DL (ref 8.5–10.1)
CHLORIDE SERPL-SCNC: 99 MMOL/L (ref 98–107)
CO2 SERPL-SCNC: 17 MMOL/L (ref 21–32)
CREAT SERPL-MCNC: 3.75 MG/DL (ref 0.55–1.02)
CRENATED CELLS: ABNORMAL
EOSINOPHIL # BLD AUTO: 0.54 X10E3/UL (ref 0–0.5)
EOSINOPHIL NFR BLD AUTO: 6.4 % (ref 1–4)
EOSINOPHIL NFR BLD MANUAL: 6 % (ref 1–4)
GLUCOSE SERPL-MCNC: 118 MG/DL (ref 74–106)
GLUCOSE SERPL-MCNC: 119 MG/DL (ref 70–105)
GLUCOSE SERPL-MCNC: 125 MG/DL (ref 70–105)
GLUCOSE SERPL-MCNC: 125 MG/DL (ref 70–105)
GLUCOSE SERPL-MCNC: 195 MG/DL (ref 70–105)
HCT VFR BLD AUTO: 29 % (ref 38–47)
HGB BLD-MCNC: 8.8 G/DL (ref 12–16)
HYPOCHROMIA BLD QL SMEAR: ABNORMAL
IMM GRANULOCYTES # BLD AUTO: 0.12 X10E3/UL (ref 0–0.04)
IMM GRANULOCYTES NFR BLD: 1.4 % (ref 0–0.4)
LYMPHOCYTES # BLD AUTO: 2.64 X10E3/UL (ref 1–4.8)
LYMPHOCYTES NFR BLD AUTO: 31.2 % (ref 27–41)
LYMPHOCYTES NFR BLD MANUAL: 34 % (ref 27–41)
MAGNESIUM SERPL-MCNC: 1.4 MG/DL (ref 1.7–2.3)
MCH RBC QN AUTO: 22.6 PG (ref 27–31)
MCHC RBC AUTO-ENTMCNC: 30.3 G/DL (ref 32–36)
MCV RBC AUTO: 74.4 FL (ref 80–96)
METAMYELOCYTES NFR BLD MANUAL: 1 %
METHICILLIN RESISTANT STAPHYLOCOCCUS AUREUS: ABNORMAL
MICROCYTES BLD QL SMEAR: ABNORMAL
MONOCYTES # BLD AUTO: 0.48 X10E3/UL (ref 0–0.8)
MONOCYTES NFR BLD AUTO: 5.7 % (ref 2–6)
MONOCYTES NFR BLD MANUAL: 7 % (ref 2–6)
MPC BLD CALC-MCNC: 10 FL (ref 9.4–12.4)
NEUTROPHILS # BLD AUTO: 4.65 X10E3/UL (ref 1.8–7.7)
NEUTROPHILS NFR BLD AUTO: 54.8 % (ref 53–65)
NEUTS SEG NFR BLD MANUAL: 52 % (ref 50–62)
NRBC # BLD AUTO: 0 X10E3/UL (ref 0–0)
NRBC, AUTO (.00): 0 /100 (ref 0–0)
OVALOCYTES BLD QL SMEAR: ABNORMAL
PLATELET # BLD AUTO: 317 X10E3/UL (ref 150–400)
POLYCHROMASIA BLD QL SMEAR: ABNORMAL
POTASSIUM SERPL-SCNC: 4.7 MMOL/L (ref 3.5–5.1)
RBC # BLD AUTO: 3.9 X10E6/UL (ref 4.2–5.4)
REPORT: NORMAL
SODIUM SERPL-SCNC: 130 MMOL/L (ref 136–145)
WBC # BLD AUTO: 8.47 X10E3/UL (ref 4.5–11)

## 2021-03-04 PROCEDURE — 97530GO CHARGE CONVERSION

## 2021-03-04 PROCEDURE — 36600 WITHDRAWAL OF ARTERIAL BLOOD: CPT

## 2021-03-04 PROCEDURE — 83735 ASSAY OF MAGNESIUM: CPT

## 2021-03-04 PROCEDURE — 80048 BASIC METABOLIC PNL TOTAL CA: CPT

## 2021-03-04 PROCEDURE — 97110GP CHARGE CONVERSION

## 2021-03-04 PROCEDURE — 82803 BLOOD GASES ANY COMBINATION: CPT

## 2021-03-04 PROCEDURE — 99990 CHARGE CONVERSION: CPT

## 2021-03-04 PROCEDURE — 97110GO CHARGE CONVERSION

## 2021-03-04 PROCEDURE — 87077 CULTURE AEROBIC IDENTIFY: CPT

## 2021-03-04 PROCEDURE — 82962 GLUCOSE BLOOD TEST: CPT

## 2021-03-04 PROCEDURE — 85025 COMPLETE CBC W/AUTO DIFF WBC: CPT

## 2021-03-04 PROCEDURE — 87040 BLOOD CULTURE FOR BACTERIA: CPT

## 2021-03-04 PROCEDURE — 36415 COLL VENOUS BLD VENIPUNCTURE: CPT

## 2021-03-05 ENCOUNTER — HISTORICAL (OUTPATIENT)
Dept: ADMINISTRATIVE | Facility: HOSPITAL | Age: 36
End: 2021-03-05

## 2021-03-05 LAB
ANION GAP SERPL CALCULATED.3IONS-SCNC: 20 MMOL/L
BACTERIA #/AREA URNS HPF: ABNORMAL /HPF
BILIRUB UR QL STRIP: NEGATIVE MG/DL
BUN SERPL-MCNC: 67 MG/DL (ref 7–18)
CALCIUM SERPL-MCNC: 9 MG/DL (ref 8.5–10.1)
CHLORIDE SERPL-SCNC: 98 MMOL/L (ref 98–107)
CHLORIDE SERPL-SCNC: <10 MMOL/L/24HR (ref 110–250)
CLARITY UR: ABNORMAL
CLARITY UR: ABNORMAL
CO2 SERPL-SCNC: 15 MMOL/L (ref 21–32)
COLOR UR: ABNORMAL
COLOR UR: ABNORMAL
CREAT SERPL-MCNC: 3.39 MG/DL (ref 0.55–1.02)
CREAT UR-MCNC: 241 MG/DL (ref 28–217)
CREAT UR-MCNC: 242 MG/DL (ref 28–217)
GLUCOSE SERPL-MCNC: 119 MG/DL (ref 74–106)
GLUCOSE SERPL-MCNC: 125 MG/DL (ref 70–105)
GLUCOSE SERPL-MCNC: 139 MG/DL (ref 70–105)
GLUCOSE SERPL-MCNC: 193 MG/DL (ref 70–105)
GLUCOSE SERPL-MCNC: 201 MG/DL (ref 70–105)
GLUCOSE UR STRIP-MCNC: NEGATIVE MG/DL
HCO3 UR-SCNC: 18 MMOL/L (ref 21–28)
HYALINE CASTS #/AREA URNS LPF: ABNORMAL /LPF (ref 0–2)
KETONES UR STRIP-SCNC: NEGATIVE MG/DL
LEUKOCYTE ESTERASE UR QL STRIP: ABNORMAL LEU/UL
MAGNESIUM SERPL-MCNC: 1.8 MG/DL (ref 1.7–2.3)
MUCOUS THREADS #/AREA URNS HPF: ABNORMAL /HPF
NITRITE UR QL STRIP: NEGATIVE
PCO2 BLDA: 30 MM HG (ref 35–48)
PH SMN: 7.39 PH UNITS (ref 7.35–7.45)
PH UR STRIP: 5 PH UNITS (ref 5–8)
PO2 BLDA: 102 MM HG (ref 83–108)
POC BASE EXCESS ARTERIAL: -5.6 MMOL/L (ref -2–3)
POC SATURATED O2: 98 % (ref 95–98)
POTASSIUM SERPL-SCNC: 26 MMOL/L/24HR (ref 25–125)
POTASSIUM SERPL-SCNC: 5.3 MMOL/L (ref 3.5–5.1)
PROT UR QL STRIP: 100 MG/DL
PROT UR-MCNC: 153.7 MG/DL (ref 28–141)
PROT/CREAT 24H UR-RTO: 0.6 MG/G
RBC # UR STRIP: ABNORMAL ERY/UL
RBC #/AREA URNS HPF: ABNORMAL /HPF (ref 0–3)
SODIUM SERPL-SCNC: 128 MMOL/L (ref 136–145)
SODIUM UR-SCNC: 11 MMOL/L/24HR (ref 40–220)
SP GR UR STRIP: >=1.03 (ref 1–1.03)
SQUAMOUS #/AREA URNS LPF: ABNORMAL /LPF
UROBILINOGEN UR STRIP-ACNC: 0.2 EU/DL
WBC #/AREA URNS HPF: ABNORMAL /HPF (ref 0–5)
YEAST #/AREA URNS HPF: ABNORMAL /HPF

## 2021-03-05 PROCEDURE — 94799 UNLISTED PULMONARY SVC/PX: CPT

## 2021-03-05 PROCEDURE — 83735 ASSAY OF MAGNESIUM: CPT

## 2021-03-05 PROCEDURE — 87040 BLOOD CULTURE FOR BACTERIA: CPT

## 2021-03-05 PROCEDURE — 87086 URINE CULTURE/COLONY COUNT: CPT

## 2021-03-05 PROCEDURE — 36415 COLL VENOUS BLD VENIPUNCTURE: CPT

## 2021-03-05 PROCEDURE — 80048 BASIC METABOLIC PNL TOTAL CA: CPT

## 2021-03-05 PROCEDURE — 84133 ASSAY OF URINE POTASSIUM: CPT

## 2021-03-05 PROCEDURE — 82570 ASSAY OF URINE CREATININE: CPT

## 2021-03-05 PROCEDURE — 51798 US URINE CAPACITY MEASURE: CPT

## 2021-03-05 PROCEDURE — 84156 ASSAY OF PROTEIN URINE: CPT

## 2021-03-05 PROCEDURE — 99990 CHARGE CONVERSION: CPT

## 2021-03-05 PROCEDURE — 82962 GLUCOSE BLOOD TEST: CPT

## 2021-03-05 PROCEDURE — 76775 US EXAM ABDO BACK WALL LIM: CPT

## 2021-03-05 PROCEDURE — 84300 ASSAY OF URINE SODIUM: CPT

## 2021-03-05 PROCEDURE — 82436 ASSAY OF URINE CHLORIDE: CPT

## 2021-03-05 PROCEDURE — 81001 URINALYSIS AUTO W/SCOPE: CPT

## 2021-03-06 ENCOUNTER — HISTORICAL (OUTPATIENT)
Dept: ADMINISTRATIVE | Facility: HOSPITAL | Age: 36
End: 2021-03-06

## 2021-03-06 LAB
ANION GAP SERPL CALCULATED.3IONS-SCNC: 20 MMOL/L
ANISOCYTOSIS BLD QL SMEAR: ABNORMAL
BASOPHILS # BLD AUTO: 0.04 X10E3/UL (ref 0–0.2)
BASOPHILS NFR BLD AUTO: 0.5 % (ref 0–1)
BUN SERPL-MCNC: 77 MG/DL (ref 7–18)
CALCIUM SERPL-MCNC: 9.2 MG/DL (ref 8.5–10.1)
CHLORIDE SERPL-SCNC: 99 MMOL/L (ref 98–107)
CO2 SERPL-SCNC: 15 MMOL/L (ref 21–32)
CREAT SERPL-MCNC: 2.85 MG/DL (ref 0.55–1.02)
DIMORPHIC RBCS: ABNORMAL
EOSINOPHIL # BLD AUTO: 0.48 X10E3/UL (ref 0–0.5)
EOSINOPHIL NFR BLD AUTO: 5.5 % (ref 1–4)
GLUCOSE SERPL-MCNC: 125 MG/DL (ref 74–106)
GLUCOSE SERPL-MCNC: 128 MG/DL (ref 70–105)
GLUCOSE SERPL-MCNC: 134 MG/DL (ref 70–105)
GLUCOSE SERPL-MCNC: 143 MG/DL (ref 70–105)
GLUCOSE SERPL-MCNC: 190 MG/DL (ref 70–105)
HCO3 UR-SCNC: 15 MMOL/L (ref 21–28)
HCT VFR BLD AUTO: 30 % (ref 38–47)
HGB BLD-MCNC: 9.4 G/DL (ref 12–16)
HYPOCHROMIA BLD QL SMEAR: ABNORMAL
IMM GRANULOCYTES # BLD AUTO: 0.09 X10E3/UL (ref 0–0.04)
IMM GRANULOCYTES NFR BLD: 1 % (ref 0–0.4)
LYMPHOCYTES # BLD AUTO: 2.21 X10E3/UL (ref 1–4.8)
LYMPHOCYTES NFR BLD AUTO: 25.5 % (ref 27–41)
MCH RBC QN AUTO: 22.8 PG (ref 27–31)
MCHC RBC AUTO-ENTMCNC: 31.3 G/DL (ref 32–36)
MCV RBC AUTO: 72.8 FL (ref 80–96)
MICROCYTES BLD QL SMEAR: ABNORMAL
MONOCYTES # BLD AUTO: 0.68 X10E3/UL (ref 0–0.8)
MONOCYTES NFR BLD AUTO: 7.8 % (ref 2–6)
MPC BLD CALC-MCNC: 9.8 FL (ref 9.4–12.4)
NEUTROPHILS # BLD AUTO: 5.18 X10E3/UL (ref 1.8–7.7)
NEUTROPHILS NFR BLD AUTO: 59.7 % (ref 53–65)
NRBC # BLD AUTO: 0 X10E3/UL (ref 0–0)
NRBC, AUTO (.00): 0 /100 (ref 0–0)
OVALOCYTES BLD QL SMEAR: ABNORMAL
PCO2 BLDA: 25 MM HG (ref 35–48)
PH SMN: 7.39 PH UNITS (ref 7.35–7.45)
PLATELET # BLD AUTO: 335 X10E3/UL (ref 150–400)
PLATELET MORPHOLOGY: ABNORMAL
PO2 BLDA: 113 MM HG (ref 83–108)
POC A-ADO2: 5
POC BASE EXCESS ARTERIAL: -8.1 MMOL/L (ref -2–3)
POC O2 STATUS: 21
POC SATURATED O2: 98 % (ref 95–98)
POLYCHROMASIA BLD QL SMEAR: ABNORMAL
POTASSIUM SERPL-SCNC: 5.4 MMOL/L (ref 3.5–5.1)
RBC # BLD AUTO: 4.12 X10E6/UL (ref 4.2–5.4)
SODIUM SERPL-SCNC: 129 MMOL/L (ref 136–145)
TARGETS BLD QL SMEAR: ABNORMAL
WBC # BLD AUTO: 8.68 X10E3/UL (ref 4.5–11)

## 2021-03-06 PROCEDURE — 85025 COMPLETE CBC W/AUTO DIFF WBC: CPT

## 2021-03-06 PROCEDURE — 36600 WITHDRAWAL OF ARTERIAL BLOOD: CPT

## 2021-03-06 PROCEDURE — 82962 GLUCOSE BLOOD TEST: CPT

## 2021-03-06 PROCEDURE — 82803 BLOOD GASES ANY COMBINATION: CPT

## 2021-03-06 PROCEDURE — 36415 COLL VENOUS BLD VENIPUNCTURE: CPT

## 2021-03-06 PROCEDURE — 99990 CHARGE CONVERSION: CPT

## 2021-03-06 PROCEDURE — 80048 BASIC METABOLIC PNL TOTAL CA: CPT

## 2021-03-07 ENCOUNTER — HISTORICAL (OUTPATIENT)
Dept: ADMINISTRATIVE | Facility: HOSPITAL | Age: 36
End: 2021-03-07

## 2021-03-07 LAB
ALBUMIN SERPL BCP-MCNC: 2.4 G/DL (ref 3.5–5)
ALP SERPL-CCNC: 144 U/L (ref 37–98)
ALT SERPL W P-5'-P-CCNC: 23 U/L (ref 13–56)
ANION GAP SERPL CALCULATED.3IONS-SCNC: 20 MMOL/L
ANISOCYTOSIS BLD QL SMEAR: ABNORMAL
AST SERPL W P-5'-P-CCNC: 18 U/L (ref 15–37)
BASOPHILS # BLD AUTO: 0.03 X10E3/UL (ref 0–0.2)
BASOPHILS NFR BLD AUTO: 0.3 % (ref 0–1)
BILIRUB DIRECT SERPL-MCNC: 0 MG/DL (ref 0–0.2)
BILIRUB SERPL-MCNC: 0.2 MG/DL (ref 0–1.2)
BUN SERPL-MCNC: 82 MG/DL (ref 7–18)
CALCIUM SERPL-MCNC: 9.2 MG/DL (ref 8.5–10.1)
CHLORIDE SERPL-SCNC: 99 MMOL/L (ref 98–107)
CO2 SERPL-SCNC: 14 MMOL/L (ref 21–32)
CREAT SERPL-MCNC: 2.7 MG/DL (ref 0.55–1.02)
DIMORPHIC RBCS: ABNORMAL
EOSINOPHIL # BLD AUTO: 0.45 X10E3/UL (ref 0–0.5)
EOSINOPHIL NFR BLD AUTO: 5.2 % (ref 1–4)
GLUCOSE SERPL-MCNC: 110 MG/DL (ref 70–105)
GLUCOSE SERPL-MCNC: 118 MG/DL (ref 70–105)
GLUCOSE SERPL-MCNC: 149 MG/DL (ref 74–106)
GLUCOSE SERPL-MCNC: 156 MG/DL (ref 70–105)
GLUCOSE SERPL-MCNC: 165 MG/DL (ref 70–105)
HCT VFR BLD AUTO: 29.8 % (ref 38–47)
HGB BLD-MCNC: 9.2 G/DL (ref 12–16)
HYPOCHROMIA BLD QL SMEAR: SLIGHT
IMM GRANULOCYTES # BLD AUTO: 0.06 X10E3/UL (ref 0–0.04)
IMM GRANULOCYTES NFR BLD: 0.7 % (ref 0–0.4)
LACTATE SERPL-SCNC: 1.3 MMOL/L (ref 0.4–2)
LACTATE SERPL-SCNC: 1.8 MMOL/L (ref 0.4–2)
LYMPHOCYTES # BLD AUTO: 2.51 X10E3/UL (ref 1–4.8)
LYMPHOCYTES NFR BLD AUTO: 29.2 % (ref 27–41)
MCH RBC QN AUTO: 22.7 PG (ref 27–31)
MCHC RBC AUTO-ENTMCNC: 30.9 G/DL (ref 32–36)
MCV RBC AUTO: 73.4 FL (ref 80–96)
MONOCYTES # BLD AUTO: 0.58 X10E3/UL (ref 0–0.8)
MONOCYTES NFR BLD AUTO: 6.7 % (ref 2–6)
MPC BLD CALC-MCNC: 10 FL (ref 9.4–12.4)
NEUTROPHILS # BLD AUTO: 4.97 X10E3/UL (ref 1.8–7.7)
NEUTROPHILS NFR BLD AUTO: 57.9 % (ref 53–65)
NRBC # BLD AUTO: 0 X10E3/UL (ref 0–0)
NRBC, AUTO (.00): 0 /100 (ref 0–0)
PLATELET # BLD AUTO: 344 X10E3/UL (ref 150–400)
PLATELET MORPHOLOGY: ABNORMAL
POTASSIUM SERPL-SCNC: 5.9 MMOL/L (ref 3.5–5.1)
PROT SERPL-MCNC: 8 G/DL (ref 6.4–8.2)
RBC # BLD AUTO: 4.06 X10E6/UL (ref 4.2–5.4)
REPORT: NORMAL
SODIUM SERPL-SCNC: 127 MMOL/L (ref 136–145)
TARGETS BLD QL SMEAR: ABNORMAL
WBC # BLD AUTO: 8.6 X10E3/UL (ref 4.5–11)

## 2021-03-07 PROCEDURE — 85025 COMPLETE CBC W/AUTO DIFF WBC: CPT

## 2021-03-07 PROCEDURE — 83605 ASSAY OF LACTIC ACID: CPT

## 2021-03-07 PROCEDURE — 99990 CHARGE CONVERSION: CPT

## 2021-03-07 PROCEDURE — 82962 GLUCOSE BLOOD TEST: CPT

## 2021-03-07 PROCEDURE — 80048 BASIC METABOLIC PNL TOTAL CA: CPT

## 2021-03-07 PROCEDURE — 36415 COLL VENOUS BLD VENIPUNCTURE: CPT

## 2021-03-07 PROCEDURE — 80076 HEPATIC FUNCTION PANEL: CPT

## 2021-03-08 ENCOUNTER — HISTORICAL (OUTPATIENT)
Dept: ADMINISTRATIVE | Facility: HOSPITAL | Age: 36
End: 2021-03-08

## 2021-03-08 LAB
ALBUMIN SERPL BCP-MCNC: 2.4 G/DL (ref 3.5–5)
ALP SERPL-CCNC: 145 U/L (ref 37–98)
ALT SERPL W P-5'-P-CCNC: 24 U/L (ref 13–56)
ANION GAP SERPL CALCULATED.3IONS-SCNC: 13 MMOL/L
ANISOCYTOSIS BLD QL SMEAR: ABNORMAL
AST SERPL W P-5'-P-CCNC: 16 U/L (ref 15–37)
BASOPHILS # BLD AUTO: 0.03 X10E3/UL (ref 0–0.2)
BASOPHILS NFR BLD AUTO: 0.4 % (ref 0–1)
BILIRUB DIRECT SERPL-MCNC: <0.05 MG/DL (ref 0–0.2)
BILIRUB SERPL-MCNC: 0.2 MG/DL (ref 0–1.2)
BUN SERPL-MCNC: 77 MG/DL (ref 7–18)
CALCIUM SERPL-MCNC: 8.9 MG/DL (ref 8.5–10.1)
CHLORIDE SERPL-SCNC: 102 MMOL/L (ref 98–107)
CO2 SERPL-SCNC: 20 MMOL/L (ref 21–32)
CREAT SERPL-MCNC: 2.51 MG/DL (ref 0.55–1.02)
EOSINOPHIL # BLD AUTO: 0.36 X10E3/UL (ref 0–0.5)
EOSINOPHIL NFR BLD AUTO: 4.3 % (ref 1–4)
EOSINOPHIL NFR BLD MANUAL: 5 % (ref 1–4)
GLUCOSE SERPL-MCNC: 136 MG/DL (ref 70–105)
GLUCOSE SERPL-MCNC: 141 MG/DL (ref 74–106)
GLUCOSE SERPL-MCNC: 148 MG/DL (ref 70–105)
GLUCOSE SERPL-MCNC: 162 MG/DL (ref 70–105)
GLUCOSE SERPL-MCNC: 206 MG/DL (ref 70–105)
HCT VFR BLD AUTO: 29.4 % (ref 38–47)
HGB BLD-MCNC: 9 G/DL (ref 12–16)
HYPOCHROMIA BLD QL SMEAR: SLIGHT
IMM GRANULOCYTES # BLD AUTO: 0.05 X10E3/UL (ref 0–0.04)
IMM GRANULOCYTES NFR BLD: 0.6 % (ref 0–0.4)
LYMPHOCYTES # BLD AUTO: 2.56 X10E3/UL (ref 1–4.8)
LYMPHOCYTES NFR BLD AUTO: 30.7 % (ref 27–41)
LYMPHOCYTES NFR BLD MANUAL: 32 % (ref 27–41)
MCH RBC QN AUTO: 23.1 PG (ref 27–31)
MCHC RBC AUTO-ENTMCNC: 30.6 G/DL (ref 32–36)
MCV RBC AUTO: 75.4 FL (ref 80–96)
MONOCYTES # BLD AUTO: 0.6 X10E3/UL (ref 0–0.8)
MONOCYTES NFR BLD AUTO: 7.2 % (ref 2–6)
MONOCYTES NFR BLD MANUAL: 7 % (ref 2–6)
MPC BLD CALC-MCNC: 10.2 FL (ref 9.4–12.4)
NEUTROPHILS # BLD AUTO: 4.73 X10E3/UL (ref 1.8–7.7)
NEUTROPHILS NFR BLD AUTO: 56.8 % (ref 53–65)
NEUTS SEG NFR BLD MANUAL: 56 % (ref 50–62)
NRBC # BLD AUTO: 0 X10E3/UL (ref 0–0)
NRBC, AUTO (.00): 0 /100 (ref 0–0)
OVALOCYTES BLD QL SMEAR: ABNORMAL
PLATELET # BLD AUTO: 343 X10E3/UL (ref 150–400)
PLATELET MORPHOLOGY: ABNORMAL
POLYCHROMASIA BLD QL SMEAR: ABNORMAL
POTASSIUM SERPL-SCNC: 5.5 MMOL/L (ref 3.5–5.1)
PROT SERPL-MCNC: 7.9 G/DL (ref 6.4–8.2)
RBC # BLD AUTO: 3.9 X10E6/UL (ref 4.2–5.4)
REPORT: NORMAL
SODIUM SERPL-SCNC: 129 MMOL/L (ref 136–145)
TARGETS BLD QL SMEAR: ABNORMAL
WBC # BLD AUTO: 8.33 X10E3/UL (ref 4.5–11)

## 2021-03-08 PROCEDURE — 97110GP CHARGE CONVERSION

## 2021-03-08 PROCEDURE — 85025 COMPLETE CBC W/AUTO DIFF WBC: CPT

## 2021-03-08 PROCEDURE — 82962 GLUCOSE BLOOD TEST: CPT

## 2021-03-08 PROCEDURE — 97530GO CHARGE CONVERSION

## 2021-03-08 PROCEDURE — 97116GP CHARGE CONVERSION

## 2021-03-08 PROCEDURE — 36415 COLL VENOUS BLD VENIPUNCTURE: CPT

## 2021-03-08 PROCEDURE — 80048 BASIC METABOLIC PNL TOTAL CA: CPT

## 2021-03-08 PROCEDURE — 80076 HEPATIC FUNCTION PANEL: CPT

## 2021-03-08 PROCEDURE — 99990 CHARGE CONVERSION: CPT

## 2021-03-08 PROCEDURE — 97110GO CHARGE CONVERSION

## 2021-03-09 ENCOUNTER — HISTORICAL (OUTPATIENT)
Dept: ADMINISTRATIVE | Facility: HOSPITAL | Age: 36
End: 2021-03-09

## 2021-03-09 LAB
GLUCOSE SERPL-MCNC: 109 MG/DL (ref 70–105)
GLUCOSE SERPL-MCNC: 137 MG/DL (ref 70–105)
GLUCOSE SERPL-MCNC: 197 MG/DL (ref 70–105)
GLUCOSE SERPL-MCNC: 206 MG/DL (ref 70–105)
REPORT: NORMAL

## 2021-03-09 PROCEDURE — 97110GP CHARGE CONVERSION

## 2021-03-09 PROCEDURE — 99990 CHARGE CONVERSION: CPT

## 2021-03-09 PROCEDURE — 97116GP CHARGE CONVERSION

## 2021-03-09 PROCEDURE — 82962 GLUCOSE BLOOD TEST: CPT

## 2021-03-09 PROCEDURE — 97110GO CHARGE CONVERSION

## 2021-03-10 ENCOUNTER — HISTORICAL (OUTPATIENT)
Dept: ADMINISTRATIVE | Facility: HOSPITAL | Age: 36
End: 2021-03-10

## 2021-03-10 LAB
ANION GAP SERPL CALCULATED.3IONS-SCNC: 18 MMOL/L
ANISOCYTOSIS BLD QL SMEAR: ABNORMAL
BASOPHILS # BLD AUTO: 0.03 X10E3/UL (ref 0–0.2)
BASOPHILS NFR BLD AUTO: 0.3 % (ref 0–1)
BUN SERPL-MCNC: 100 MG/DL (ref 7–18)
CALCIUM SERPL-MCNC: 9.1 MG/DL (ref 8.5–10.1)
CHLORIDE SERPL-SCNC: 101 MMOL/L (ref 98–107)
CO2 SERPL-SCNC: 18 MMOL/L (ref 21–32)
CREAT SERPL-MCNC: 2.55 MG/DL (ref 0.55–1.02)
CRP SERPL-MCNC: <0.2 UG/ML (ref 0–0.8)
DACRYOCYTES BLD QL SMEAR: ABNORMAL
EOSINOPHIL # BLD AUTO: 0.48 X10E3/UL (ref 0–0.5)
EOSINOPHIL NFR BLD AUTO: 5.6 % (ref 1–4)
GLUCOSE SERPL-MCNC: 150 MG/DL (ref 70–105)
GLUCOSE SERPL-MCNC: 160 MG/DL (ref 74–106)
GLUCOSE SERPL-MCNC: 184 MG/DL (ref 70–105)
HCT VFR BLD AUTO: 28 % (ref 38–47)
HGB BLD-MCNC: 8.9 G/DL (ref 12–16)
HYPOCHROMIA BLD QL SMEAR: SLIGHT
IMM GRANULOCYTES # BLD AUTO: 0.06 X10E3/UL (ref 0–0.04)
IMM GRANULOCYTES NFR BLD: 0.7 % (ref 0–0.4)
LYMPHOCYTES # BLD AUTO: 2.54 X10E3/UL (ref 1–4.8)
LYMPHOCYTES NFR BLD AUTO: 29.6 % (ref 27–41)
MCH RBC QN AUTO: 23.7 PG (ref 27–31)
MCHC RBC AUTO-ENTMCNC: 31.8 G/DL (ref 32–36)
MCV RBC AUTO: 74.7 FL (ref 80–96)
MICROCYTES BLD QL SMEAR: ABNORMAL
MONOCYTES # BLD AUTO: 0.76 X10E3/UL (ref 0–0.8)
MONOCYTES NFR BLD AUTO: 8.9 % (ref 2–6)
MPC BLD CALC-MCNC: 10 FL (ref 9.4–12.4)
NEUTROPHILS # BLD AUTO: 4.71 X10E3/UL (ref 1.8–7.7)
NEUTROPHILS NFR BLD AUTO: 54.9 % (ref 53–65)
NRBC # BLD AUTO: 0 X10E3/UL (ref 0–0)
NRBC, AUTO (.00): 0 /100 (ref 0–0)
OVALOCYTES BLD QL SMEAR: ABNORMAL
PLATELET # BLD AUTO: 335 X10E3/UL (ref 150–400)
PLATELET MORPHOLOGY: ABNORMAL
POLYCHROMASIA BLD QL SMEAR: ABNORMAL
POTASSIUM SERPL-SCNC: 5.8 MMOL/L (ref 3.5–5.1)
RBC # BLD AUTO: 3.75 X10E6/UL (ref 4.2–5.4)
SODIUM SERPL-SCNC: 131 MMOL/L (ref 136–145)
WBC # BLD AUTO: 8.58 X10E3/UL (ref 4.5–11)

## 2021-03-10 PROCEDURE — 85025 COMPLETE CBC W/AUTO DIFF WBC: CPT

## 2021-03-10 PROCEDURE — 86140 C-REACTIVE PROTEIN: CPT

## 2021-03-10 PROCEDURE — 99990 CHARGE CONVERSION: CPT

## 2021-03-10 PROCEDURE — 82962 GLUCOSE BLOOD TEST: CPT

## 2021-03-10 PROCEDURE — 97535GO CHARGE CONVERSION

## 2021-03-10 PROCEDURE — 36415 COLL VENOUS BLD VENIPUNCTURE: CPT

## 2021-03-10 PROCEDURE — 80048 BASIC METABOLIC PNL TOTAL CA: CPT

## 2021-03-11 PROCEDURE — 85025 COMPLETE CBC W/AUTO DIFF WBC: CPT

## 2021-03-11 PROCEDURE — 99990 CHARGE CONVERSION: CPT

## 2021-03-11 PROCEDURE — 80048 BASIC METABOLIC PNL TOTAL CA: CPT

## 2021-03-11 PROCEDURE — 82962 GLUCOSE BLOOD TEST: CPT

## 2021-03-11 PROCEDURE — 97110GP CHARGE CONVERSION

## 2021-03-11 PROCEDURE — 97116GP CHARGE CONVERSION

## 2021-03-11 PROCEDURE — 36415 COLL VENOUS BLD VENIPUNCTURE: CPT

## 2021-03-11 PROCEDURE — 97110GO CHARGE CONVERSION

## 2021-03-11 PROCEDURE — 97535GO CHARGE CONVERSION

## 2021-03-12 PROCEDURE — 82962 GLUCOSE BLOOD TEST: CPT

## 2021-03-12 PROCEDURE — 80048 BASIC METABOLIC PNL TOTAL CA: CPT

## 2021-03-12 PROCEDURE — 36415 COLL VENOUS BLD VENIPUNCTURE: CPT

## 2021-03-12 PROCEDURE — 99990 CHARGE CONVERSION: CPT

## 2021-03-12 PROCEDURE — 85025 COMPLETE CBC W/AUTO DIFF WBC: CPT

## 2021-03-13 DIAGNOSIS — Z93.2 ILEOSTOMY STATUS: ICD-10-CM

## 2021-03-13 DIAGNOSIS — R00.0 TACHYCARDIA: ICD-10-CM

## 2021-03-13 DIAGNOSIS — S31.000A SACRAL WOUND: ICD-10-CM

## 2021-03-13 DIAGNOSIS — E11.10 DIABETIC KETOACIDOSIS WITHOUT COMA ASSOCIATED WITH TYPE 2 DIABETES MELLITUS: ICD-10-CM

## 2021-03-13 DIAGNOSIS — E11.10 DKA (DIABETIC KETOACIDOSES): ICD-10-CM

## 2021-03-13 DIAGNOSIS — E11.9 TYPE 2 DIABETES MELLITUS, WITHOUT LONG-TERM CURRENT USE OF INSULIN: Chronic | ICD-10-CM

## 2021-03-13 DIAGNOSIS — N17.9 AKI (ACUTE KIDNEY INJURY): ICD-10-CM

## 2021-03-13 PROCEDURE — 80048 BASIC METABOLIC PNL TOTAL CA: CPT

## 2021-03-13 PROCEDURE — 36415 COLL VENOUS BLD VENIPUNCTURE: CPT

## 2021-03-13 PROCEDURE — 85025 COMPLETE CBC W/AUTO DIFF WBC: CPT

## 2021-03-13 PROCEDURE — 82962 GLUCOSE BLOOD TEST: CPT

## 2021-03-13 PROCEDURE — 99990 CHARGE CONVERSION: CPT

## 2021-03-13 RX ORDER — HYDROCODONE BITARTRATE AND ACETAMINOPHEN 7.5; 325 MG/1; MG/1
1 TABLET ORAL EVERY 4 HOURS PRN
Status: DISCONTINUED | OUTPATIENT
Start: 2021-03-13 | End: 2021-03-15 | Stop reason: HOSPADM

## 2021-03-13 RX ORDER — LOSARTAN POTASSIUM 25 MG/1
25 TABLET ORAL DAILY
Status: ON HOLD | COMMUNITY
End: 2021-04-06 | Stop reason: HOSPADM

## 2021-03-13 RX ORDER — FAMOTIDINE 20 MG/1
20 TABLET, FILM COATED ORAL 2 TIMES DAILY
Status: DISCONTINUED | OUTPATIENT
Start: 2021-03-13 | End: 2021-03-15 | Stop reason: HOSPADM

## 2021-03-13 RX ORDER — INSULIN ASPART 100 [IU]/ML
3-15 INJECTION, SOLUTION INTRAVENOUS; SUBCUTANEOUS
Status: DISCONTINUED | OUTPATIENT
Start: 2021-03-13 | End: 2021-03-15 | Stop reason: HOSPADM

## 2021-03-13 RX ORDER — SODIUM BICARBONATE 650 MG/1
1300 TABLET ORAL 3 TIMES DAILY
Status: DISCONTINUED | OUTPATIENT
Start: 2021-03-13 | End: 2021-03-15 | Stop reason: HOSPADM

## 2021-03-13 RX ORDER — GABAPENTIN 300 MG/1
300 CAPSULE ORAL DAILY
COMMUNITY
End: 2023-01-12 | Stop reason: ALTCHOICE

## 2021-03-13 RX ORDER — ATORVASTATIN CALCIUM 20 MG/1
20 TABLET, FILM COATED ORAL NIGHTLY
COMMUNITY
End: 2023-01-12 | Stop reason: SDUPTHER

## 2021-03-13 RX ORDER — MORPHINE SULFATE 4 MG/ML
4 INJECTION, SOLUTION INTRAMUSCULAR; INTRAVENOUS EVERY 6 HOURS PRN
Status: DISCONTINUED | OUTPATIENT
Start: 2021-03-13 | End: 2021-03-15 | Stop reason: HOSPADM

## 2021-03-13 RX ORDER — HEPARIN SODIUM 5000 [USP'U]/ML
5000 INJECTION, SOLUTION INTRAVENOUS; SUBCUTANEOUS EVERY 8 HOURS
Status: DISCONTINUED | OUTPATIENT
Start: 2021-03-13 | End: 2021-03-15 | Stop reason: HOSPADM

## 2021-03-13 RX ORDER — SODIUM CHLORIDE 9 MG/ML
250 INJECTION, SOLUTION INTRAVENOUS
Status: DISCONTINUED | OUTPATIENT
Start: 2021-03-13 | End: 2021-03-15 | Stop reason: HOSPADM

## 2021-03-13 RX ORDER — ONDANSETRON 2 MG/ML
8 INJECTION INTRAMUSCULAR; INTRAVENOUS EVERY 8 HOURS PRN
Status: DISCONTINUED | OUTPATIENT
Start: 2021-03-13 | End: 2021-03-15 | Stop reason: HOSPADM

## 2021-03-13 RX ORDER — CHOLESTYRAMINE 4 G/4.8G
4 POWDER, FOR SUSPENSION ORAL DAILY
Status: DISCONTINUED | OUTPATIENT
Start: 2021-03-13 | End: 2021-03-15 | Stop reason: HOSPADM

## 2021-03-13 RX ORDER — INSULIN ASPART 100 [IU]/ML
15 INJECTION, SUSPENSION SUBCUTANEOUS
Status: ON HOLD | COMMUNITY
End: 2021-04-06 | Stop reason: HOSPADM

## 2021-03-13 RX ORDER — INSULIN ASPART 100 [IU]/ML
15 INJECTION, SUSPENSION SUBCUTANEOUS
Status: DISCONTINUED | OUTPATIENT
Start: 2021-03-13 | End: 2021-03-15 | Stop reason: HOSPADM

## 2021-03-13 RX ORDER — GLUCAGON 1 MG
1 KIT INJECTION
Status: DISCONTINUED | OUTPATIENT
Start: 2021-03-13 | End: 2021-03-15 | Stop reason: HOSPADM

## 2021-03-13 RX ORDER — LOPERAMIDE HYDROCHLORIDE 2 MG/1
2 CAPSULE ORAL 2 TIMES DAILY
Status: DISCONTINUED | OUTPATIENT
Start: 2021-03-13 | End: 2021-03-15 | Stop reason: HOSPADM

## 2021-03-13 RX ORDER — FLUCONAZOLE 100 MG/1
100 TABLET ORAL DAILY
Status: ON HOLD | COMMUNITY
End: 2021-04-06 | Stop reason: HOSPADM

## 2021-03-13 RX ORDER — MAGNESIUM SULFATE HEPTAHYDRATE 40 MG/ML
4 INJECTION, SOLUTION INTRAVENOUS
Status: DISCONTINUED | OUTPATIENT
Start: 2021-03-13 | End: 2021-03-15 | Stop reason: HOSPADM

## 2021-03-13 RX ORDER — FERROUS SULFATE 325(65) MG
325 TABLET, DELAYED RELEASE (ENTERIC COATED) ORAL 3 TIMES DAILY
Status: ON HOLD | COMMUNITY
End: 2021-04-24 | Stop reason: SDUPTHER

## 2021-03-14 PROCEDURE — 99232 PR SUBSEQUENT HOSPITAL CARE,LEVL II: ICD-10-PCS | Mod: ,,, | Performed by: HOSPITALIST

## 2021-03-14 PROCEDURE — 80048 BASIC METABOLIC PNL TOTAL CA: CPT | Mod: XB

## 2021-03-14 PROCEDURE — 82962 GLUCOSE BLOOD TEST: CPT

## 2021-03-14 PROCEDURE — 63600175 PHARM REV CODE 636 W HCPCS

## 2021-03-14 PROCEDURE — 99990 CHARGE CONVERSION: CPT

## 2021-03-14 PROCEDURE — 85025 COMPLETE CBC W/AUTO DIFF WBC: CPT

## 2021-03-14 PROCEDURE — 11000001 HC ACUTE MED/SURG PRIVATE ROOM

## 2021-03-14 PROCEDURE — 80053 COMPREHEN METABOLIC PANEL: CPT

## 2021-03-14 PROCEDURE — 36415 COLL VENOUS BLD VENIPUNCTURE: CPT

## 2021-03-14 PROCEDURE — 25000003 PHARM REV CODE 250

## 2021-03-14 PROCEDURE — 99232 SBSQ HOSP IP/OBS MODERATE 35: CPT | Mod: ,,, | Performed by: HOSPITALIST

## 2021-03-14 RX ADMIN — HEPARIN SODIUM 5000 UNITS: 5000 INJECTION INTRAVENOUS; SUBCUTANEOUS at 09:03

## 2021-03-14 RX ADMIN — CHOLESTYRAMINE 4 G: 4 POWDER, FOR SUSPENSION ORAL at 09:03

## 2021-03-14 RX ADMIN — PSYLLIUM HUSK 1 PACKET: 3.4 POWDER ORAL at 09:03

## 2021-03-14 RX ADMIN — INSULIN ASPART 15 UNITS: 100 INJECTION, SUSPENSION SUBCUTANEOUS at 04:03

## 2021-03-14 RX ADMIN — LOPERAMIDE HYDROCHLORIDE 2 MG: 2 CAPSULE ORAL at 09:03

## 2021-03-14 RX ADMIN — HEPARIN SODIUM 5000 UNITS: 5000 INJECTION INTRAVENOUS; SUBCUTANEOUS at 05:03

## 2021-03-14 RX ADMIN — SODIUM BICARBONATE TAB 650 MG 1300 MG: 650 TAB at 09:03

## 2021-03-14 RX ADMIN — HEPARIN SODIUM 5000 UNITS: 5000 INJECTION INTRAVENOUS; SUBCUTANEOUS at 02:03

## 2021-03-14 RX ADMIN — INSULIN ASPART 3 UNITS: 100 INJECTION, SOLUTION INTRAVENOUS; SUBCUTANEOUS at 09:03

## 2021-03-14 RX ADMIN — FAMOTIDINE 20 MG: 20 TABLET, FILM COATED ORAL at 08:03

## 2021-03-14 RX ADMIN — HYDROCODONE BITARTRATE AND ACETAMINOPHEN 1 TABLET: 7.5; 325 TABLET ORAL at 10:03

## 2021-03-14 RX ADMIN — SODIUM BICARBONATE TAB 650 MG 1300 MG: 650 TAB at 08:03

## 2021-03-14 RX ADMIN — FAMOTIDINE 20 MG: 20 TABLET, FILM COATED ORAL at 09:03

## 2021-03-14 RX ADMIN — INSULIN ASPART 3 UNITS: 100 INJECTION, SOLUTION INTRAVENOUS; SUBCUTANEOUS at 11:03

## 2021-03-14 RX ADMIN — INSULIN ASPART 3 UNITS: 100 INJECTION, SOLUTION INTRAVENOUS; SUBCUTANEOUS at 04:03

## 2021-03-14 RX ADMIN — SODIUM BICARBONATE TAB 650 MG 1300 MG: 650 TAB at 02:03

## 2021-03-15 VITALS
OXYGEN SATURATION: 96 % | HEIGHT: 65 IN | BODY MASS INDEX: 39.78 KG/M2 | DIASTOLIC BLOOD PRESSURE: 66 MMHG | RESPIRATION RATE: 18 BRPM | SYSTOLIC BLOOD PRESSURE: 104 MMHG | TEMPERATURE: 98 F | HEART RATE: 102 BPM | WEIGHT: 238.75 LBS

## 2021-03-15 PROBLEM — E11.10 DKA (DIABETIC KETOACIDOSES): Status: RESOLVED | Noted: 2021-03-13 | Resolved: 2021-03-15

## 2021-03-15 LAB
ANISOCYTOSIS BLD QL SMEAR: ABNORMAL
BASOPHILS # BLD AUTO: 0.02 X10E3/UL (ref 0–0.2)
BASOPHILS NFR BLD AUTO: 0.3 % (ref 0–1)
EOSINOPHIL # BLD AUTO: 0.17 X10E3/UL (ref 0–0.5)
EOSINOPHIL NFR BLD AUTO: 2.9 % (ref 1–4)
ERYTHROCYTE [DISTWIDTH] IN BLOOD BY AUTOMATED COUNT: 28.8 % (ref 11.5–14.5)
GLUCOSE SERPL-MCNC: 128 MG/DL (ref 70–105)
GLUCOSE SERPL-MCNC: 150 MG/DL (ref 70–105)
HCT VFR BLD AUTO: 31.1 % (ref 38–47)
HGB BLD-MCNC: 9.4 G/DL (ref 12–16)
IMM GRANULOCYTES # BLD AUTO: 0.08 X10E3/UL (ref 0–0.04)
IMM GRANULOCYTES NFR BLD: 1.4 % (ref 0–0.4)
LYMPHOCYTES # BLD AUTO: 1.74 X10E3/UL (ref 1–4.8)
LYMPHOCYTES NFR BLD AUTO: 29.9 % (ref 27–41)
MCH RBC QN AUTO: 23 PG (ref 27–31)
MCHC RBC AUTO-ENTMCNC: 30.2 G/DL (ref 32–36)
MCV RBC AUTO: 76 FL (ref 80–96)
MONOCYTES # BLD AUTO: 0.74 X10E3/UL (ref 0–0.8)
MONOCYTES NFR BLD AUTO: 12.7 % (ref 2–6)
MPC BLD CALC-MCNC: 10.3 FL (ref 9.4–12.4)
NEUTROPHILS # BLD AUTO: 3.07 X10E3/UL (ref 1.8–7.7)
NEUTROPHILS NFR BLD AUTO: 52.8 % (ref 53–65)
NRBC # BLD AUTO: 0 X10E3/UL (ref 0–0)
NRBC, AUTO (.00): 0 /100 (ref 0–0)
OVALOCYTES BLD QL SMEAR: ABNORMAL
PLATELET # BLD AUTO: 309 X10E3/UL (ref 150–400)
PLATELET MORPHOLOGY: NORMAL
POLYCHROMASIA BLD QL SMEAR: ABNORMAL
RBC # BLD AUTO: 4.09 X10E6/UL (ref 4.2–5.4)
TARGETS BLD QL SMEAR: ABNORMAL
WBC # BLD AUTO: 5.82 X10E3/UL (ref 4.5–11)

## 2021-03-15 PROCEDURE — 25000003 PHARM REV CODE 250

## 2021-03-15 PROCEDURE — 82962 GLUCOSE BLOOD TEST: CPT

## 2021-03-15 PROCEDURE — 99239 PR HOSPITAL DISCHARGE DAY,>30 MIN: ICD-10-PCS | Mod: ,,, | Performed by: HOSPITALIST

## 2021-03-15 PROCEDURE — 99239 HOSP IP/OBS DSCHRG MGMT >30: CPT | Mod: ,,, | Performed by: HOSPITALIST

## 2021-03-15 PROCEDURE — 63600175 PHARM REV CODE 636 W HCPCS

## 2021-03-15 RX ADMIN — PSYLLIUM HUSK 1 PACKET: 3.4 POWDER ORAL at 09:03

## 2021-03-15 RX ADMIN — MORPHINE SULFATE 4 MG: 4 INJECTION INTRAVENOUS at 07:03

## 2021-03-15 RX ADMIN — FAMOTIDINE 20 MG: 20 TABLET, FILM COATED ORAL at 10:03

## 2021-03-15 RX ADMIN — HEPARIN SODIUM 5000 UNITS: 5000 INJECTION INTRAVENOUS; SUBCUTANEOUS at 05:03

## 2021-03-15 RX ADMIN — LOPERAMIDE HYDROCHLORIDE 2 MG: 2 CAPSULE ORAL at 10:03

## 2021-03-15 RX ADMIN — SODIUM BICARBONATE TAB 650 MG 1300 MG: 650 TAB at 10:03

## 2021-03-15 RX ADMIN — CHOLESTYRAMINE 4 G: 4 POWDER, FOR SUSPENSION ORAL at 10:03

## 2021-03-17 LAB
GLUCOSE SERPL-MCNC: 151 MG/DL (ref 70–105)
GLUCOSE SERPL-MCNC: 156 MG/DL (ref 70–105)
GLUCOSE SERPL-MCNC: 78 MG/DL (ref 70–105)

## 2021-03-26 ENCOUNTER — HOSPITAL ENCOUNTER (INPATIENT)
Facility: HOSPITAL | Age: 36
LOS: 11 days | Discharge: HOME HEALTH | DRG: 982 | End: 2021-04-06
Attending: EMERGENCY MEDICINE | Admitting: FAMILY MEDICINE
Payer: MEDICAID

## 2021-03-26 DIAGNOSIS — N17.9 ACUTE KIDNEY INJURY: ICD-10-CM

## 2021-03-26 DIAGNOSIS — S31.000D WOUND OF SACRAL REGION, SUBSEQUENT ENCOUNTER: ICD-10-CM

## 2021-03-26 DIAGNOSIS — E86.0 DEHYDRATION: Primary | ICD-10-CM

## 2021-03-26 DIAGNOSIS — E11.10 DIABETIC KETOACIDOSIS WITHOUT COMA ASSOCIATED WITH TYPE 2 DIABETES MELLITUS: ICD-10-CM

## 2021-03-26 DIAGNOSIS — R11.10 ABDOMINAL PAIN WITH VOMITING: ICD-10-CM

## 2021-03-26 DIAGNOSIS — E87.0 HYPERNATREMIA: ICD-10-CM

## 2021-03-26 DIAGNOSIS — N30.00 ACUTE CYSTITIS: ICD-10-CM

## 2021-03-26 DIAGNOSIS — E87.5 HYPERKALEMIA: ICD-10-CM

## 2021-03-26 DIAGNOSIS — R10.9 ABDOMINAL PAIN WITH VOMITING: ICD-10-CM

## 2021-03-26 PROBLEM — I10 HTN (HYPERTENSION): Status: ACTIVE | Noted: 2021-03-19

## 2021-03-26 LAB
ALBUMIN SERPL BCP-MCNC: 3.5 G/DL (ref 3.5–5)
ALBUMIN/GLOB SERPL: 0.5 {RATIO}
ALP SERPL-CCNC: 214 U/L (ref 37–98)
ALT SERPL W P-5'-P-CCNC: 38 U/L (ref 13–56)
ANION GAP SERPL CALCULATED.3IONS-SCNC: 17 MMOL/L
ANISOCYTOSIS BLD QL SMEAR: ABNORMAL
AST SERPL W P-5'-P-CCNC: 50 U/L (ref 15–37)
BASOPHILS # BLD AUTO: 0.03 X10E3/UL (ref 0–0.2)
BASOPHILS NFR BLD AUTO: 0.3 % (ref 0–1)
BILIRUB SERPL-MCNC: 0.7 MG/DL (ref 0–1.2)
BUN SERPL-MCNC: 47 MG/DL (ref 7–18)
BUN/CREAT SERPL: 5.8
CALCIUM SERPL-MCNC: 9.4 MG/DL (ref 8.5–10.1)
CHLORIDE SERPL-SCNC: 83 MMOL/L (ref 98–107)
CO2 SERPL-SCNC: 22 MMOL/L (ref 21–32)
CREAT SERPL-MCNC: 8.08 MG/DL (ref 0.55–1.02)
EOSINOPHIL # BLD AUTO: 0.03 X10E3/UL (ref 0–0.5)
EOSINOPHIL NFR BLD AUTO: 0.3 % (ref 1–4)
ERYTHROCYTE [DISTWIDTH] IN BLOOD BY AUTOMATED COUNT: 24.3 % (ref 11.5–14.5)
GLOBULIN SER-MCNC: 7 G/DL (ref 2–4)
GLUCOSE SERPL-MCNC: 251 MG/DL (ref 74–106)
HCT VFR BLD AUTO: 32.7 % (ref 38–47)
HGB BLD-MCNC: 10.3 G/DL (ref 12–16)
HYPOCHROMIA BLD QL SMEAR: SLIGHT
IMM GRANULOCYTES # BLD AUTO: 0.24 X10E3/UL (ref 0–0.04)
IMM GRANULOCYTES NFR BLD: 2.1 % (ref 0–0.4)
LACTATE SERPL-SCNC: 1.2 MMOL/L (ref 0.4–2)
LYMPHOCYTES # BLD AUTO: 1.49 X10E3/UL (ref 1–4.8)
LYMPHOCYTES NFR BLD AUTO: 12.9 % (ref 27–41)
LYMPHOCYTES NFR BLD MANUAL: 14 % (ref 27–41)
MCH RBC QN AUTO: 24.3 PG (ref 27–31)
MCHC RBC AUTO-ENTMCNC: 31.5 G/DL (ref 32–36)
MCV RBC AUTO: 77.1 FL (ref 80–96)
MONOCYTES # BLD AUTO: 0.44 X10E3/UL (ref 0–0.8)
MONOCYTES NFR BLD AUTO: 3.8 % (ref 2–6)
MONOCYTES NFR BLD MANUAL: 3 % (ref 2–6)
MPC BLD CALC-MCNC: 9.6 FL (ref 9.4–12.4)
NEUTROPHILS # BLD AUTO: 9.32 X10E3/UL (ref 1.8–7.7)
NEUTROPHILS NFR BLD AUTO: 80.6 % (ref 53–65)
NEUTS BAND NFR BLD MANUAL: 1 % (ref 1–5)
NEUTS SEG NFR BLD MANUAL: 82 % (ref 50–62)
NRBC # BLD AUTO: 0 X10E3/UL (ref 0–0)
NRBC, AUTO (.00): 0 /100 (ref 0–0)
OVALOCYTES BLD QL SMEAR: ABNORMAL
PLATELET # BLD AUTO: 495 X10E3/UL (ref 150–400)
PLATELET MORPHOLOGY: ABNORMAL
POTASSIUM SERPL-SCNC: 4.4 MMOL/L (ref 3.5–5.1)
PROT SERPL-MCNC: 10.5 G/DL (ref 6.4–8.2)
RBC # BLD AUTO: 4.24 X10E6/UL (ref 4.2–5.4)
SODIUM SERPL-SCNC: 118 MMOL/L (ref 136–145)
TARGETS BLD QL SMEAR: ABNORMAL
WBC # BLD AUTO: 11.55 X10E3/UL (ref 4.5–11)

## 2021-03-26 PROCEDURE — 36415 COLL VENOUS BLD VENIPUNCTURE: CPT

## 2021-03-26 PROCEDURE — 63600175 PHARM REV CODE 636 W HCPCS: Performed by: EMERGENCY MEDICINE

## 2021-03-26 PROCEDURE — 96374 THER/PROPH/DIAG INJ IV PUSH: CPT

## 2021-03-26 PROCEDURE — 11000001 HC ACUTE MED/SURG PRIVATE ROOM

## 2021-03-26 PROCEDURE — 80053 COMPREHEN METABOLIC PANEL: CPT

## 2021-03-26 PROCEDURE — 85025 COMPLETE CBC W/AUTO DIFF WBC: CPT

## 2021-03-26 PROCEDURE — 99285 EMERGENCY DEPT VISIT HI MDM: CPT | Mod: ,,, | Performed by: EMERGENCY MEDICINE

## 2021-03-26 PROCEDURE — 87428 SARSCOV & INF VIR A&B AG IA: CPT

## 2021-03-26 PROCEDURE — 83605 ASSAY OF LACTIC ACID: CPT

## 2021-03-26 PROCEDURE — 99285 EMERGENCY DEPT VISIT HI MDM: CPT | Mod: 25

## 2021-03-26 PROCEDURE — 96361 HYDRATE IV INFUSION ADD-ON: CPT

## 2021-03-26 PROCEDURE — 99285 PR EMERGENCY DEPT VISIT,LEVEL V: ICD-10-PCS | Mod: ,,, | Performed by: EMERGENCY MEDICINE

## 2021-03-26 PROCEDURE — 87040 BLOOD CULTURE FOR BACTERIA: CPT

## 2021-03-26 RX ORDER — METOCLOPRAMIDE HYDROCHLORIDE 5 MG/ML
10 INJECTION INTRAMUSCULAR; INTRAVENOUS ONCE
Status: COMPLETED | OUTPATIENT
Start: 2021-03-26 | End: 2021-03-26

## 2021-03-26 RX ORDER — HYDRALAZINE HYDROCHLORIDE 20 MG/ML
10 INJECTION INTRAMUSCULAR; INTRAVENOUS EVERY 6 HOURS PRN
Status: DISCONTINUED | OUTPATIENT
Start: 2021-03-27 | End: 2021-04-06 | Stop reason: HOSPADM

## 2021-03-26 RX ORDER — METOCLOPRAMIDE HYDROCHLORIDE 5 MG/ML
10 INJECTION INTRAMUSCULAR; INTRAVENOUS EVERY 6 HOURS PRN
Status: DISCONTINUED | OUTPATIENT
Start: 2021-03-27 | End: 2021-03-26

## 2021-03-26 RX ORDER — TALC
6 POWDER (GRAM) TOPICAL NIGHTLY PRN
Status: DISCONTINUED | OUTPATIENT
Start: 2021-03-26 | End: 2021-04-06 | Stop reason: HOSPADM

## 2021-03-26 RX ORDER — SODIUM CHLORIDE, SODIUM LACTATE, POTASSIUM CHLORIDE, CALCIUM CHLORIDE 600; 310; 30; 20 MG/100ML; MG/100ML; MG/100ML; MG/100ML
1000 INJECTION, SOLUTION INTRAVENOUS
Status: COMPLETED | OUTPATIENT
Start: 2021-03-26 | End: 2021-03-27

## 2021-03-26 RX ORDER — ONDANSETRON 2 MG/ML
4 INJECTION INTRAMUSCULAR; INTRAVENOUS EVERY 6 HOURS PRN
Status: DISCONTINUED | OUTPATIENT
Start: 2021-03-26 | End: 2021-03-27

## 2021-03-26 RX ORDER — ONDANSETRON 2 MG/ML
4 INJECTION INTRAMUSCULAR; INTRAVENOUS
Status: COMPLETED | OUTPATIENT
Start: 2021-03-26 | End: 2021-03-26

## 2021-03-26 RX ORDER — INSULIN ASPART 100 [IU]/ML
1-10 INJECTION, SOLUTION INTRAVENOUS; SUBCUTANEOUS EVERY 6 HOURS PRN
Status: DISCONTINUED | OUTPATIENT
Start: 2021-03-26 | End: 2021-04-06 | Stop reason: HOSPADM

## 2021-03-26 RX ORDER — SODIUM CHLORIDE 9 MG/ML
INJECTION, SOLUTION INTRAVENOUS ONCE
Status: DISCONTINUED | OUTPATIENT
Start: 2021-03-26 | End: 2021-03-26

## 2021-03-26 RX ORDER — GLUCAGON 1 MG
1 KIT INJECTION
Status: DISCONTINUED | OUTPATIENT
Start: 2021-03-27 | End: 2021-04-06 | Stop reason: HOSPADM

## 2021-03-26 RX ORDER — SODIUM CHLORIDE 9 MG/ML
INJECTION, SOLUTION INTRAVENOUS ONCE
Status: COMPLETED | OUTPATIENT
Start: 2021-03-26 | End: 2021-03-27

## 2021-03-26 RX ORDER — SODIUM CHLORIDE 0.9 % (FLUSH) 0.9 %
10 SYRINGE (ML) INJECTION
Status: DISCONTINUED | OUTPATIENT
Start: 2021-03-26 | End: 2021-04-06 | Stop reason: HOSPADM

## 2021-03-26 RX ADMIN — SODIUM CHLORIDE, POTASSIUM CHLORIDE, SODIUM LACTATE AND CALCIUM CHLORIDE 1000 ML: 600; 310; 30; 20 INJECTION, SOLUTION INTRAVENOUS at 10:03

## 2021-03-26 RX ADMIN — SODIUM CHLORIDE, POTASSIUM CHLORIDE, SODIUM LACTATE AND CALCIUM CHLORIDE 1000 ML: 600; 310; 30; 20 INJECTION, SOLUTION INTRAVENOUS at 08:03

## 2021-03-26 RX ADMIN — ONDANSETRON 4 MG: 2 INJECTION INTRAMUSCULAR; INTRAVENOUS at 07:03

## 2021-03-26 RX ADMIN — METOCLOPRAMIDE 10 MG: 5 INJECTION, SOLUTION INTRAMUSCULAR; INTRAVENOUS at 11:03

## 2021-03-27 PROBLEM — S31.000A SACRAL WOUND: Status: ACTIVE | Noted: 2021-03-27

## 2021-03-27 PROBLEM — N30.00 ACUTE CYSTITIS: Status: ACTIVE | Noted: 2021-03-27

## 2021-03-27 PROBLEM — E11.9 TYPE 2 DIABETES MELLITUS, WITHOUT LONG-TERM CURRENT USE OF INSULIN: Chronic | Status: ACTIVE | Noted: 2021-03-27

## 2021-03-27 LAB
ANION GAP SERPL CALCULATED.3IONS-SCNC: 20 MMOL/L
BACTERIA #/AREA URNS HPF: ABNORMAL /HPF
BILIRUB UR QL STRIP: ABNORMAL MG/DL
BUN SERPL-MCNC: 54 MG/DL (ref 7–18)
CALCIUM SERPL-MCNC: 8.6 MG/DL (ref 8.5–10.1)
CHLORIDE SERPL-SCNC: 89 MMOL/L (ref 98–107)
CK SERPL-CCNC: 46 U/L (ref 26–192)
CLARITY UR: ABNORMAL
CLARITY UR: ABNORMAL
CO2 SERPL-SCNC: 21 MMOL/L (ref 21–32)
COLOR UR: YELLOW
COLOR UR: YELLOW
CREAT SERPL-MCNC: 8.75 MG/DL (ref 0.55–1.02)
FLUAV AG UPPER RESP QL IA.RAPID: NEGATIVE
FLUBV AG UPPER RESP QL IA.RAPID: NEGATIVE
GLUCOSE SERPL-MCNC: 160 MG/DL (ref 74–106)
GLUCOSE SERPL-MCNC: 191 MG/DL (ref 70–105)
GLUCOSE SERPL-MCNC: 226 MG/DL (ref 70–105)
GLUCOSE UR STRIP-MCNC: NEGATIVE MG/DL
KETONES UR STRIP-SCNC: NEGATIVE MG/DL
LEUKOCYTE ESTERASE UR QL STRIP: ABNORMAL LEU/UL
MUCOUS THREADS #/AREA URNS HPF: ABNORMAL /HPF
NITRITE UR QL STRIP: NEGATIVE
OSMOLALITY SERPL: 287 MOSM/KG (ref 275–300)
PH UR STRIP: 6 PH UNITS (ref 5–8)
POTASSIUM SERPL-SCNC: 4.2 MMOL/L (ref 3.5–5.1)
PROT UR QL STRIP: >=300 MG/DL
RBC # UR STRIP: ABNORMAL ERY/UL
RBC #/AREA URNS HPF: ABNORMAL /HPF (ref 0–3)
SARS-COV+SARS-COV-2 AG RESP QL IA.RAPID: NEGATIVE
SODIUM SERPL-SCNC: 126 MMOL/L (ref 136–145)
SP GR UR STRIP: 1.02 (ref 1–1.03)
SQUAMOUS #/AREA URNS LPF: ABNORMAL /LPF
TRICHOMONAS #/AREA URNS HPF: ABNORMAL /HPF
UROBILINOGEN UR STRIP-ACNC: 0.2 EU/DL
WBC #/AREA URNS HPF: ABNORMAL /HPF (ref 0–5)
YEAST #/AREA URNS HPF: ABNORMAL /HPF

## 2021-03-27 PROCEDURE — C9113 INJ PANTOPRAZOLE SODIUM, VIA: HCPCS | Performed by: FAMILY MEDICINE

## 2021-03-27 PROCEDURE — 36415 COLL VENOUS BLD VENIPUNCTURE: CPT

## 2021-03-27 PROCEDURE — 81001 URINALYSIS AUTO W/SCOPE: CPT

## 2021-03-27 PROCEDURE — 80048 BASIC METABOLIC PNL TOTAL CA: CPT

## 2021-03-27 PROCEDURE — 94761 N-INVAS EAR/PLS OXIMETRY MLT: CPT

## 2021-03-27 PROCEDURE — 25000003 PHARM REV CODE 250: Performed by: FAMILY MEDICINE

## 2021-03-27 PROCEDURE — 11000001 HC ACUTE MED/SURG PRIVATE ROOM

## 2021-03-27 PROCEDURE — 82550 ASSAY OF CK (CPK): CPT

## 2021-03-27 PROCEDURE — 87086 URINE CULTURE/COLONY COUNT: CPT

## 2021-03-27 PROCEDURE — 82962 GLUCOSE BLOOD TEST: CPT

## 2021-03-27 PROCEDURE — 63600175 PHARM REV CODE 636 W HCPCS: Performed by: FAMILY MEDICINE

## 2021-03-27 RX ORDER — MORPHINE SULFATE 2 MG/ML
2 INJECTION, SOLUTION INTRAMUSCULAR; INTRAVENOUS EVERY 6 HOURS PRN
Status: DISCONTINUED | OUTPATIENT
Start: 2021-03-27 | End: 2021-03-30

## 2021-03-27 RX ORDER — METOCLOPRAMIDE HYDROCHLORIDE 5 MG/ML
10 INJECTION INTRAMUSCULAR; INTRAVENOUS EVERY 6 HOURS
Status: DISCONTINUED | OUTPATIENT
Start: 2021-03-27 | End: 2021-03-27

## 2021-03-27 RX ORDER — HEPARIN SODIUM 5000 [USP'U]/ML
5000 INJECTION, SOLUTION INTRAVENOUS; SUBCUTANEOUS EVERY 12 HOURS
Status: DISCONTINUED | OUTPATIENT
Start: 2021-03-27 | End: 2021-04-05

## 2021-03-27 RX ORDER — PANTOPRAZOLE SODIUM 40 MG/10ML
40 INJECTION, POWDER, LYOPHILIZED, FOR SOLUTION INTRAVENOUS DAILY
Status: DISCONTINUED | OUTPATIENT
Start: 2021-03-27 | End: 2021-04-06 | Stop reason: HOSPADM

## 2021-03-27 RX ORDER — ONDANSETRON 2 MG/ML
4 INJECTION INTRAMUSCULAR; INTRAVENOUS EVERY 6 HOURS PRN
Status: DISCONTINUED | OUTPATIENT
Start: 2021-03-27 | End: 2021-04-06 | Stop reason: HOSPADM

## 2021-03-27 RX ORDER — SODIUM CHLORIDE 9 MG/ML
INJECTION, SOLUTION INTRAVENOUS CONTINUOUS
Status: DISCONTINUED | OUTPATIENT
Start: 2021-03-27 | End: 2021-03-30

## 2021-03-27 RX ADMIN — CEFTRIAXONE SODIUM 1 G: 1 INJECTION, POWDER, FOR SOLUTION INTRAMUSCULAR; INTRAVENOUS at 02:03

## 2021-03-27 RX ADMIN — HEPARIN SODIUM 5000 UNITS: 5000 INJECTION INTRAVENOUS; SUBCUTANEOUS at 10:03

## 2021-03-27 RX ADMIN — SODIUM CHLORIDE 125 ML/HR: 9 INJECTION, SOLUTION INTRAVENOUS at 02:03

## 2021-03-27 RX ADMIN — MORPHINE SULFATE 2 MG: 2 INJECTION, SOLUTION INTRAMUSCULAR; INTRAVENOUS at 10:03

## 2021-03-27 RX ADMIN — INSULIN ASPART 4 UNITS: 100 INJECTION, SOLUTION INTRAVENOUS; SUBCUTANEOUS at 06:03

## 2021-03-27 RX ADMIN — SODIUM CHLORIDE: 9 INJECTION, SOLUTION INTRAVENOUS at 10:03

## 2021-03-27 RX ADMIN — INSULIN ASPART 2 UNITS: 100 INJECTION, SOLUTION INTRAVENOUS; SUBCUTANEOUS at 06:03

## 2021-03-27 RX ADMIN — SODIUM CHLORIDE: 9 INJECTION, SOLUTION INTRAVENOUS at 01:03

## 2021-03-27 RX ADMIN — SODIUM CHLORIDE: 9 INJECTION, SOLUTION INTRAVENOUS at 02:03

## 2021-03-27 RX ADMIN — ONDANSETRON 4 MG: 2 INJECTION INTRAMUSCULAR; INTRAVENOUS at 02:03

## 2021-03-27 RX ADMIN — PANTOPRAZOLE SODIUM 40 MG: 40 INJECTION, POWDER, FOR SOLUTION INTRAVENOUS at 02:03

## 2021-03-27 RX ADMIN — ONDANSETRON 4 MG: 2 INJECTION INTRAMUSCULAR; INTRAVENOUS at 06:03

## 2021-03-28 LAB
ANION GAP SERPL CALCULATED.3IONS-SCNC: 19 MMOL/L
ANISOCYTOSIS BLD QL SMEAR: ABNORMAL
BASOPHILS # BLD AUTO: 0.01 X10E3/UL (ref 0–0.2)
BASOPHILS NFR BLD AUTO: 0.1 % (ref 0–1)
BUN SERPL-MCNC: 57 MG/DL (ref 7–18)
CALCIUM SERPL-MCNC: 8.5 MG/DL (ref 8.5–10.1)
CHLORIDE SERPL-SCNC: 95 MMOL/L (ref 98–107)
CO2 SERPL-SCNC: 19 MMOL/L (ref 21–32)
CREAT SERPL-MCNC: 7.68 MG/DL (ref 0.55–1.02)
EOSINOPHIL # BLD AUTO: 0.04 X10E3/UL (ref 0–0.5)
EOSINOPHIL NFR BLD AUTO: 0.6 % (ref 1–4)
ERYTHROCYTE [DISTWIDTH] IN BLOOD BY AUTOMATED COUNT: 23.1 % (ref 11.5–14.5)
GLUCOSE SERPL-MCNC: 130 MG/DL (ref 70–105)
GLUCOSE SERPL-MCNC: 149 MG/DL (ref 70–105)
GLUCOSE SERPL-MCNC: 154 MG/DL (ref 74–106)
GLUCOSE SERPL-MCNC: 158 MG/DL (ref 70–105)
HCT VFR BLD AUTO: 25.2 % (ref 38–47)
HGB BLD-MCNC: 7.9 G/DL (ref 12–16)
HYPOCHROMIA BLD QL SMEAR: SLIGHT
IMM GRANULOCYTES # BLD AUTO: 0.08 X10E3/UL (ref 0–0.04)
IMM GRANULOCYTES NFR BLD: 1.1 % (ref 0–0.4)
LYMPHOCYTES # BLD AUTO: 1.21 X10E3/UL (ref 1–4.8)
LYMPHOCYTES NFR BLD AUTO: 16.7 % (ref 27–41)
LYMPHOCYTES NFR BLD MANUAL: 14 % (ref 27–41)
MCH RBC QN AUTO: 24.2 PG (ref 27–31)
MCHC RBC AUTO-ENTMCNC: 31.3 G/DL (ref 32–36)
MCV RBC AUTO: 77.1 FL (ref 80–96)
MONOCYTES # BLD AUTO: 0.51 X10E3/UL (ref 0–0.8)
MONOCYTES NFR BLD AUTO: 7 % (ref 2–6)
MONOCYTES NFR BLD MANUAL: 10 % (ref 2–6)
MPC BLD CALC-MCNC: 9.7 FL (ref 9.4–12.4)
NEUTROPHILS # BLD AUTO: 5.4 X10E3/UL (ref 1.8–7.7)
NEUTROPHILS NFR BLD AUTO: 74.5 % (ref 53–65)
NEUTS BAND NFR BLD MANUAL: 1 % (ref 1–5)
NEUTS SEG NFR BLD MANUAL: 75 % (ref 50–62)
NRBC # BLD AUTO: 0 X10E3/UL (ref 0–0)
NRBC, AUTO (.00): 0 /100 (ref 0–0)
PLATELET # BLD AUTO: 454 X10E3/UL (ref 150–400)
PLATELET MORPHOLOGY: ABNORMAL
POLYCHROMASIA BLD QL SMEAR: ABNORMAL
POTASSIUM SERPL-SCNC: 4 MMOL/L (ref 3.5–5.1)
RBC # BLD AUTO: 3.27 X10E6/UL (ref 4.2–5.4)
SODIUM SERPL-SCNC: 129 MMOL/L (ref 136–145)
WBC # BLD AUTO: 7.25 X10E3/UL (ref 4.5–11)

## 2021-03-28 PROCEDURE — 36415 COLL VENOUS BLD VENIPUNCTURE: CPT

## 2021-03-28 PROCEDURE — 11000001 HC ACUTE MED/SURG PRIVATE ROOM

## 2021-03-28 PROCEDURE — C9113 INJ PANTOPRAZOLE SODIUM, VIA: HCPCS | Performed by: FAMILY MEDICINE

## 2021-03-28 PROCEDURE — 82962 GLUCOSE BLOOD TEST: CPT

## 2021-03-28 PROCEDURE — 25000003 PHARM REV CODE 250: Performed by: FAMILY MEDICINE

## 2021-03-28 PROCEDURE — 80048 BASIC METABOLIC PNL TOTAL CA: CPT

## 2021-03-28 PROCEDURE — 63600175 PHARM REV CODE 636 W HCPCS: Performed by: FAMILY MEDICINE

## 2021-03-28 PROCEDURE — 85025 COMPLETE CBC W/AUTO DIFF WBC: CPT

## 2021-03-28 PROCEDURE — 94761 N-INVAS EAR/PLS OXIMETRY MLT: CPT

## 2021-03-28 PROCEDURE — 94760 N-INVAS EAR/PLS OXIMETRY 1: CPT

## 2021-03-28 RX ADMIN — HEPARIN SODIUM 5000 UNITS: 5000 INJECTION INTRAVENOUS; SUBCUTANEOUS at 09:03

## 2021-03-28 RX ADMIN — PANTOPRAZOLE SODIUM 40 MG: 40 INJECTION, POWDER, FOR SOLUTION INTRAVENOUS at 09:03

## 2021-03-28 RX ADMIN — SODIUM CHLORIDE: 9 INJECTION, SOLUTION INTRAVENOUS at 09:03

## 2021-03-28 RX ADMIN — MORPHINE SULFATE 2 MG: 2 INJECTION, SOLUTION INTRAMUSCULAR; INTRAVENOUS at 04:03

## 2021-03-28 RX ADMIN — SODIUM CHLORIDE: 9 INJECTION, SOLUTION INTRAVENOUS at 08:03

## 2021-03-28 RX ADMIN — CEFTRIAXONE SODIUM 1 G: 1 INJECTION, POWDER, FOR SOLUTION INTRAMUSCULAR; INTRAVENOUS at 03:03

## 2021-03-28 RX ADMIN — HEPARIN SODIUM 5000 UNITS: 5000 INJECTION INTRAVENOUS; SUBCUTANEOUS at 08:03

## 2021-03-28 RX ADMIN — ONDANSETRON 4 MG: 2 INJECTION INTRAMUSCULAR; INTRAVENOUS at 09:03

## 2021-03-28 RX ADMIN — ONDANSETRON 4 MG: 2 INJECTION INTRAMUSCULAR; INTRAVENOUS at 03:03

## 2021-03-28 RX ADMIN — HYDRALAZINE HYDROCHLORIDE 10 MG: 20 INJECTION INTRAMUSCULAR; INTRAVENOUS at 08:03

## 2021-03-29 PROBLEM — E11.10 DIABETIC KETOACIDOSIS WITHOUT COMA ASSOCIATED WITH TYPE 2 DIABETES MELLITUS: Status: RESOLVED | Noted: 2021-03-13 | Resolved: 2021-03-29

## 2021-03-29 LAB
ANION GAP SERPL CALCULATED.3IONS-SCNC: 18 MMOL/L
ANISOCYTOSIS BLD QL SMEAR: ABNORMAL
BASOPHILS # BLD AUTO: 0.02 X10E3/UL (ref 0–0.2)
BASOPHILS NFR BLD AUTO: 0.3 % (ref 0–1)
BUN SERPL-MCNC: 49 MG/DL (ref 7–18)
CALCIUM SERPL-MCNC: 8.4 MG/DL (ref 8.5–10.1)
CHLORIDE SERPL-SCNC: 100 MMOL/L (ref 98–107)
CO2 SERPL-SCNC: 19 MMOL/L (ref 21–32)
CREAT SERPL-MCNC: 5.04 MG/DL (ref 0.55–1.02)
EOSINOPHIL # BLD AUTO: 0.04 X10E3/UL (ref 0–0.5)
EOSINOPHIL NFR BLD AUTO: 0.6 % (ref 1–4)
ERYTHROCYTE [DISTWIDTH] IN BLOOD BY AUTOMATED COUNT: 23.9 % (ref 11.5–14.5)
GLUCOSE SERPL-MCNC: 165 MG/DL (ref 70–105)
GLUCOSE SERPL-MCNC: 166 MG/DL (ref 74–106)
GLUCOSE SERPL-MCNC: 175 MG/DL (ref 70–105)
HCT VFR BLD AUTO: 23.1 % (ref 38–47)
HGB BLD-MCNC: 7.5 G/DL (ref 12–16)
HYPOCHROMIA BLD QL SMEAR: SLIGHT
IMM GRANULOCYTES # BLD AUTO: 0.07 X10E3/UL (ref 0–0.04)
IMM GRANULOCYTES NFR BLD: 1.1 % (ref 0–0.4)
LYMPHOCYTES # BLD AUTO: 1.46 X10E3/UL (ref 1–4.8)
LYMPHOCYTES NFR BLD AUTO: 21.9 % (ref 27–41)
MCH RBC QN AUTO: 25 PG (ref 27–31)
MCHC RBC AUTO-ENTMCNC: 32.5 G/DL (ref 32–36)
MCV RBC AUTO: 77 FL (ref 80–96)
MONOCYTES # BLD AUTO: 0.58 X10E3/UL (ref 0–0.8)
MONOCYTES NFR BLD AUTO: 8.7 % (ref 2–6)
MPC BLD CALC-MCNC: 9.8 FL (ref 9.4–12.4)
NEUTROPHILS # BLD AUTO: 4.49 X10E3/UL (ref 1.8–7.7)
NEUTROPHILS NFR BLD AUTO: 67.4 % (ref 53–65)
NRBC # BLD AUTO: 0 X10E3/UL (ref 0–0)
NRBC, AUTO (.00): 0 /100 (ref 0–0)
PLATELET # BLD AUTO: 444 X10E3/UL (ref 150–400)
PLATELET MORPHOLOGY: ABNORMAL
POLYCHROMASIA BLD QL SMEAR: ABNORMAL
POTASSIUM SERPL-SCNC: 3.6 MMOL/L (ref 3.5–5.1)
RBC # BLD AUTO: 3 X10E6/UL (ref 4.2–5.4)
REPORT: NORMAL
SODIUM SERPL-SCNC: 133 MMOL/L (ref 136–145)
TARGETS BLD QL SMEAR: ABNORMAL
WBC # BLD AUTO: 6.66 X10E3/UL (ref 4.5–11)

## 2021-03-29 PROCEDURE — 36415 COLL VENOUS BLD VENIPUNCTURE: CPT

## 2021-03-29 PROCEDURE — 94761 N-INVAS EAR/PLS OXIMETRY MLT: CPT

## 2021-03-29 PROCEDURE — 25000003 PHARM REV CODE 250: Performed by: FAMILY MEDICINE

## 2021-03-29 PROCEDURE — C9113 INJ PANTOPRAZOLE SODIUM, VIA: HCPCS | Performed by: FAMILY MEDICINE

## 2021-03-29 PROCEDURE — 11000001 HC ACUTE MED/SURG PRIVATE ROOM

## 2021-03-29 PROCEDURE — 80048 BASIC METABOLIC PNL TOTAL CA: CPT

## 2021-03-29 PROCEDURE — 85025 COMPLETE CBC W/AUTO DIFF WBC: CPT

## 2021-03-29 PROCEDURE — 25000003 PHARM REV CODE 250: Performed by: STUDENT IN AN ORGANIZED HEALTH CARE EDUCATION/TRAINING PROGRAM

## 2021-03-29 PROCEDURE — 82962 GLUCOSE BLOOD TEST: CPT

## 2021-03-29 PROCEDURE — 63600175 PHARM REV CODE 636 W HCPCS: Performed by: FAMILY MEDICINE

## 2021-03-29 RX ORDER — CHOLESTYRAMINE 4 G/9G
1 POWDER, FOR SUSPENSION ORAL 2 TIMES DAILY
Status: DISCONTINUED | OUTPATIENT
Start: 2021-03-29 | End: 2021-04-01

## 2021-03-29 RX ORDER — LOPERAMIDE HYDROCHLORIDE 2 MG/1
2 CAPSULE ORAL 2 TIMES DAILY
Status: DISCONTINUED | OUTPATIENT
Start: 2021-03-29 | End: 2021-04-06 | Stop reason: HOSPADM

## 2021-03-29 RX ADMIN — MORPHINE SULFATE 2 MG: 2 INJECTION, SOLUTION INTRAMUSCULAR; INTRAVENOUS at 08:03

## 2021-03-29 RX ADMIN — CEFTRIAXONE SODIUM 1 G: 1 INJECTION, POWDER, FOR SOLUTION INTRAMUSCULAR; INTRAVENOUS at 03:03

## 2021-03-29 RX ADMIN — SODIUM CHLORIDE: 9 INJECTION, SOLUTION INTRAVENOUS at 02:03

## 2021-03-29 RX ADMIN — LOPERAMIDE HYDROCHLORIDE 2 MG: 2 CAPSULE ORAL at 08:03

## 2021-03-29 RX ADMIN — SODIUM CHLORIDE: 9 INJECTION, SOLUTION INTRAVENOUS at 03:03

## 2021-03-29 RX ADMIN — HEPARIN SODIUM 5000 UNITS: 5000 INJECTION INTRAVENOUS; SUBCUTANEOUS at 09:03

## 2021-03-29 RX ADMIN — HEPARIN SODIUM 5000 UNITS: 5000 INJECTION INTRAVENOUS; SUBCUTANEOUS at 08:03

## 2021-03-29 RX ADMIN — HYDRALAZINE HYDROCHLORIDE 10 MG: 20 INJECTION INTRAMUSCULAR; INTRAVENOUS at 08:03

## 2021-03-29 RX ADMIN — PANTOPRAZOLE SODIUM 40 MG: 40 INJECTION, POWDER, FOR SOLUTION INTRAVENOUS at 09:03

## 2021-03-29 RX ADMIN — MORPHINE SULFATE 2 MG: 2 INJECTION, SOLUTION INTRAMUSCULAR; INTRAVENOUS at 09:03

## 2021-03-29 RX ADMIN — CHOLESTYRAMINE 4 G: 4 POWDER, FOR SUSPENSION ORAL at 08:03

## 2021-03-29 RX ADMIN — ONDANSETRON 4 MG: 2 INJECTION INTRAMUSCULAR; INTRAVENOUS at 03:03

## 2021-03-29 RX ADMIN — MORPHINE SULFATE 2 MG: 2 INJECTION, SOLUTION INTRAMUSCULAR; INTRAVENOUS at 12:03

## 2021-03-30 ENCOUNTER — ANESTHESIA EVENT (OUTPATIENT)
Dept: SURGERY | Facility: HOSPITAL | Age: 36
DRG: 982 | End: 2021-03-30
Payer: MEDICAID

## 2021-03-30 ENCOUNTER — ANESTHESIA (OUTPATIENT)
Dept: SURGERY | Facility: HOSPITAL | Age: 36
DRG: 982 | End: 2021-03-30
Payer: MEDICAID

## 2021-03-30 LAB
ANION GAP SERPL CALCULATED.3IONS-SCNC: 17 MMOL/L
ANISOCYTOSIS BLD QL SMEAR: ABNORMAL
BASOPHILS # BLD AUTO: 0.02 X10E3/UL (ref 0–0.2)
BASOPHILS NFR BLD AUTO: 0.3 % (ref 0–1)
BUN SERPL-MCNC: 39 MG/DL (ref 7–18)
CALCIUM SERPL-MCNC: 8 MG/DL (ref 8.5–10.1)
CHLORIDE SERPL-SCNC: 105 MMOL/L (ref 98–107)
CO2 SERPL-SCNC: 18 MMOL/L (ref 21–32)
CREAT SERPL-MCNC: 2.83 MG/DL (ref 0.55–1.02)
EOSINOPHIL # BLD AUTO: 0.04 X10E3/UL (ref 0–0.5)
EOSINOPHIL NFR BLD AUTO: 0.6 % (ref 1–4)
ERYTHROCYTE [DISTWIDTH] IN BLOOD BY AUTOMATED COUNT: 23.2 % (ref 11.5–14.5)
GLUCOSE SERPL-MCNC: 136 MG/DL (ref 70–105)
GLUCOSE SERPL-MCNC: 142 MG/DL (ref 74–106)
GLUCOSE SERPL-MCNC: 143 MG/DL (ref 70–105)
GLUCOSE SERPL-MCNC: 149 MG/DL (ref 70–105)
GLUCOSE SERPL-MCNC: 179 MG/DL (ref 70–105)
HCT VFR BLD AUTO: 23.1 % (ref 38–47)
HGB BLD-MCNC: 7.3 G/DL (ref 12–16)
HYPOCHROMIA BLD QL SMEAR: SLIGHT
IMM GRANULOCYTES # BLD AUTO: 0.06 X10E3/UL (ref 0–0.04)
IMM GRANULOCYTES NFR BLD: 0.8 % (ref 0–0.4)
LYMPHOCYTES # BLD AUTO: 2.17 X10E3/UL (ref 1–4.8)
LYMPHOCYTES NFR BLD AUTO: 30.5 % (ref 27–41)
MCH RBC QN AUTO: 24.6 PG (ref 27–31)
MCHC RBC AUTO-ENTMCNC: 31.6 G/DL (ref 32–36)
MCV RBC AUTO: 77.8 FL (ref 80–96)
MONOCYTES # BLD AUTO: 0.63 X10E3/UL (ref 0–0.8)
MONOCYTES NFR BLD AUTO: 8.9 % (ref 2–6)
MPC BLD CALC-MCNC: 10.2 FL (ref 9.4–12.4)
NEUTROPHILS # BLD AUTO: 4.19 X10E3/UL (ref 1.8–7.7)
NEUTROPHILS NFR BLD AUTO: 58.9 % (ref 53–65)
NRBC # BLD AUTO: 0 X10E3/UL (ref 0–0)
NRBC, AUTO (.00): 0 /100 (ref 0–0)
OVALOCYTES BLD QL SMEAR: ABNORMAL
PLATELET # BLD AUTO: 478 X10E3/UL (ref 150–400)
PLATELET MORPHOLOGY: ABNORMAL
POLYCHROMASIA BLD QL SMEAR: ABNORMAL
POTASSIUM SERPL-SCNC: 3.5 MMOL/L (ref 3.5–5.1)
RBC # BLD AUTO: 2.97 X10E6/UL (ref 4.2–5.4)
SODIUM SERPL-SCNC: 136 MMOL/L (ref 136–145)
TARGETS BLD QL SMEAR: ABNORMAL
WBC # BLD AUTO: 7.11 X10E3/UL (ref 4.5–11)

## 2021-03-30 PROCEDURE — 36000707: Performed by: STUDENT IN AN ORGANIZED HEALTH CARE EDUCATION/TRAINING PROGRAM

## 2021-03-30 PROCEDURE — 36000706: Performed by: STUDENT IN AN ORGANIZED HEALTH CARE EDUCATION/TRAINING PROGRAM

## 2021-03-30 PROCEDURE — 37000008 HC ANESTHESIA 1ST 15 MINUTES: Performed by: STUDENT IN AN ORGANIZED HEALTH CARE EDUCATION/TRAINING PROGRAM

## 2021-03-30 PROCEDURE — C9113 INJ PANTOPRAZOLE SODIUM, VIA: HCPCS | Performed by: FAMILY MEDICINE

## 2021-03-30 PROCEDURE — 14301 TIS TRNFR ANY 30.1-60 SQ CM: CPT | Mod: ,,, | Performed by: STUDENT IN AN ORGANIZED HEALTH CARE EDUCATION/TRAINING PROGRAM

## 2021-03-30 PROCEDURE — 25000003 PHARM REV CODE 250: Performed by: STUDENT IN AN ORGANIZED HEALTH CARE EDUCATION/TRAINING PROGRAM

## 2021-03-30 PROCEDURE — 82962 GLUCOSE BLOOD TEST: CPT

## 2021-03-30 PROCEDURE — D9220A PRA ANESTHESIA: Mod: ANES,,, | Performed by: ANESTHESIOLOGY

## 2021-03-30 PROCEDURE — 63600175 PHARM REV CODE 636 W HCPCS: Performed by: ANESTHESIOLOGY

## 2021-03-30 PROCEDURE — 37000009 HC ANESTHESIA EA ADD 15 MINS: Performed by: STUDENT IN AN ORGANIZED HEALTH CARE EDUCATION/TRAINING PROGRAM

## 2021-03-30 PROCEDURE — 27100168 OPTIME MED/SURG SUP & DEVICES NON-STERILE SUPPLY: Performed by: STUDENT IN AN ORGANIZED HEALTH CARE EDUCATION/TRAINING PROGRAM

## 2021-03-30 PROCEDURE — 14302 PR ADJ TISS XFER ANY AREA,EA ADD 30.0 SQCM: ICD-10-PCS | Mod: ,,, | Performed by: STUDENT IN AN ORGANIZED HEALTH CARE EDUCATION/TRAINING PROGRAM

## 2021-03-30 PROCEDURE — 63600175 PHARM REV CODE 636 W HCPCS

## 2021-03-30 PROCEDURE — D9220A PRA ANESTHESIA: Mod: CRNA,,, | Performed by: NURSE ANESTHETIST, CERTIFIED REGISTERED

## 2021-03-30 PROCEDURE — 63600175 PHARM REV CODE 636 W HCPCS: Performed by: FAMILY MEDICINE

## 2021-03-30 PROCEDURE — 11000001 HC ACUTE MED/SURG PRIVATE ROOM

## 2021-03-30 PROCEDURE — 25000003 PHARM REV CODE 250: Performed by: NURSE ANESTHETIST, CERTIFIED REGISTERED

## 2021-03-30 PROCEDURE — 63600175 PHARM REV CODE 636 W HCPCS: Performed by: NURSE ANESTHETIST, CERTIFIED REGISTERED

## 2021-03-30 PROCEDURE — 36415 COLL VENOUS BLD VENIPUNCTURE: CPT

## 2021-03-30 PROCEDURE — 94761 N-INVAS EAR/PLS OXIMETRY MLT: CPT

## 2021-03-30 PROCEDURE — D9220A PRA ANESTHESIA: ICD-10-PCS | Mod: ANES,,, | Performed by: ANESTHESIOLOGY

## 2021-03-30 PROCEDURE — 14302 TIS TRNFR ADDL 30 SQ CM: CPT | Mod: ,,, | Performed by: STUDENT IN AN ORGANIZED HEALTH CARE EDUCATION/TRAINING PROGRAM

## 2021-03-30 PROCEDURE — 71000033 HC RECOVERY, INTIAL HOUR: Performed by: STUDENT IN AN ORGANIZED HEALTH CARE EDUCATION/TRAINING PROGRAM

## 2021-03-30 PROCEDURE — D9220A PRA ANESTHESIA: ICD-10-PCS | Mod: CRNA,,, | Performed by: NURSE ANESTHETIST, CERTIFIED REGISTERED

## 2021-03-30 PROCEDURE — 85025 COMPLETE CBC W/AUTO DIFF WBC: CPT

## 2021-03-30 PROCEDURE — 63700000 PHARM REV CODE 250 ALT 637 W/O HCPCS: Performed by: STUDENT IN AN ORGANIZED HEALTH CARE EDUCATION/TRAINING PROGRAM

## 2021-03-30 PROCEDURE — 27201423 OPTIME MED/SURG SUP & DEVICES STERILE SUPPLY: Performed by: STUDENT IN AN ORGANIZED HEALTH CARE EDUCATION/TRAINING PROGRAM

## 2021-03-30 PROCEDURE — 80048 BASIC METABOLIC PNL TOTAL CA: CPT

## 2021-03-30 PROCEDURE — 14301 PR ADJ TISS XFER ANY AREA,30.1-60 SQCM: ICD-10-PCS | Mod: ,,, | Performed by: STUDENT IN AN ORGANIZED HEALTH CARE EDUCATION/TRAINING PROGRAM

## 2021-03-30 RX ORDER — MORPHINE SULFATE 2 MG/ML
2 INJECTION, SOLUTION INTRAMUSCULAR; INTRAVENOUS EVERY 4 HOURS PRN
Status: DISCONTINUED | OUTPATIENT
Start: 2021-03-31 | End: 2021-04-06 | Stop reason: HOSPADM

## 2021-03-30 RX ORDER — ACETAMINOPHEN 325 MG/1
650 TABLET ORAL EVERY 4 HOURS PRN
Status: DISCONTINUED | OUTPATIENT
Start: 2021-03-31 | End: 2021-04-06 | Stop reason: HOSPADM

## 2021-03-30 RX ORDER — HYDROMORPHONE HYDROCHLORIDE 2 MG/ML
0.5 INJECTION, SOLUTION INTRAMUSCULAR; INTRAVENOUS; SUBCUTANEOUS EVERY 5 MIN PRN
Status: DISCONTINUED | OUTPATIENT
Start: 2021-03-30 | End: 2021-04-06 | Stop reason: HOSPADM

## 2021-03-30 RX ORDER — NEOSTIGMINE METHYLSULFATE 1 MG/ML
INJECTION, SOLUTION INTRAVENOUS
Status: DISCONTINUED | OUTPATIENT
Start: 2021-03-30 | End: 2021-03-30

## 2021-03-30 RX ORDER — MEPERIDINE HYDROCHLORIDE 25 MG/ML
25 INJECTION INTRAMUSCULAR; INTRAVENOUS; SUBCUTANEOUS EVERY 10 MIN PRN
Status: ACTIVE | OUTPATIENT
Start: 2021-03-30 | End: 2021-03-30

## 2021-03-30 RX ORDER — PROPOFOL 10 MG/ML
VIAL (ML) INTRAVENOUS
Status: DISCONTINUED | OUTPATIENT
Start: 2021-03-30 | End: 2021-03-30

## 2021-03-30 RX ORDER — ROCURONIUM BROMIDE 50 MG/5 ML
SYRINGE (ML) INTRAVENOUS
Status: DISCONTINUED | OUTPATIENT
Start: 2021-03-30 | End: 2021-03-30

## 2021-03-30 RX ORDER — MORPHINE SULFATE 10 MG/ML
4 INJECTION INTRAMUSCULAR; INTRAVENOUS; SUBCUTANEOUS EVERY 5 MIN PRN
Status: DISCONTINUED | OUTPATIENT
Start: 2021-03-30 | End: 2021-04-06 | Stop reason: HOSPADM

## 2021-03-30 RX ORDER — CHLORHEXIDINE GLUCONATE 40 MG/ML
SOLUTION TOPICAL
Status: DISCONTINUED | OUTPATIENT
Start: 2021-03-30 | End: 2021-03-30 | Stop reason: HOSPADM

## 2021-03-30 RX ORDER — FENTANYL CITRATE 50 UG/ML
INJECTION, SOLUTION INTRAMUSCULAR; INTRAVENOUS
Status: DISCONTINUED | OUTPATIENT
Start: 2021-03-30 | End: 2021-03-30

## 2021-03-30 RX ORDER — CEFAZOLIN SODIUM 1 G/3ML
INJECTION, POWDER, FOR SOLUTION INTRAMUSCULAR; INTRAVENOUS
Status: DISCONTINUED | OUTPATIENT
Start: 2021-03-30 | End: 2021-03-30

## 2021-03-30 RX ORDER — LIDOCAINE HYDROCHLORIDE 20 MG/ML
INJECTION INTRAVENOUS
Status: DISCONTINUED | OUTPATIENT
Start: 2021-03-30 | End: 2021-03-30

## 2021-03-30 RX ORDER — PHENYLEPHRINE HYDROCHLORIDE 10 MG/ML
INJECTION INTRAVENOUS
Status: DISCONTINUED | OUTPATIENT
Start: 2021-03-30 | End: 2021-03-30

## 2021-03-30 RX ORDER — GLYCOPYRROLATE 0.2 MG/ML
INJECTION INTRAMUSCULAR; INTRAVENOUS
Status: DISCONTINUED | OUTPATIENT
Start: 2021-03-30 | End: 2021-03-30

## 2021-03-30 RX ORDER — ONDANSETRON 2 MG/ML
4 INJECTION INTRAMUSCULAR; INTRAVENOUS DAILY PRN
Status: DISCONTINUED | OUTPATIENT
Start: 2021-03-30 | End: 2021-04-06 | Stop reason: HOSPADM

## 2021-03-30 RX ORDER — DIPHENHYDRAMINE HYDROCHLORIDE 50 MG/ML
25 INJECTION INTRAMUSCULAR; INTRAVENOUS EVERY 6 HOURS PRN
Status: DISCONTINUED | OUTPATIENT
Start: 2021-03-30 | End: 2021-04-06 | Stop reason: HOSPADM

## 2021-03-30 RX ORDER — MIDAZOLAM HYDROCHLORIDE 5 MG/ML
INJECTION INTRAMUSCULAR; INTRAVENOUS
Status: DISCONTINUED | OUTPATIENT
Start: 2021-03-30 | End: 2021-03-30

## 2021-03-30 RX ORDER — ONDANSETRON 2 MG/ML
INJECTION INTRAMUSCULAR; INTRAVENOUS
Status: DISCONTINUED | OUTPATIENT
Start: 2021-03-30 | End: 2021-03-30

## 2021-03-30 RX ORDER — BUPIVACAINE HYDROCHLORIDE 2.5 MG/ML
INJECTION, SOLUTION EPIDURAL; INFILTRATION; INTRACAUDAL
Status: DISCONTINUED | OUTPATIENT
Start: 2021-03-30 | End: 2021-03-30 | Stop reason: HOSPADM

## 2021-03-30 RX ORDER — MEPERIDINE HYDROCHLORIDE 25 MG/ML
INJECTION INTRAMUSCULAR; INTRAVENOUS; SUBCUTANEOUS
Status: DISCONTINUED | OUTPATIENT
Start: 2021-03-30 | End: 2021-03-30

## 2021-03-30 RX ADMIN — PHENYLEPHRINE HYDROCHLORIDE 100 MCG: 10 INJECTION INTRAVENOUS at 01:03

## 2021-03-30 RX ADMIN — ONDANSETRON 8 MG: 2 INJECTION INTRAMUSCULAR; INTRAVENOUS at 01:03

## 2021-03-30 RX ADMIN — PHENYLEPHRINE HYDROCHLORIDE 200 MCG: 10 INJECTION INTRAVENOUS at 01:03

## 2021-03-30 RX ADMIN — NEOSTIGMINE METHYLSULFATE 3 MG: 1 INJECTION INTRAVENOUS at 02:03

## 2021-03-30 RX ADMIN — PHENYLEPHRINE HYDROCHLORIDE 200 MCG: 10 INJECTION INTRAVENOUS at 12:03

## 2021-03-30 RX ADMIN — LOPERAMIDE HYDROCHLORIDE 2 MG: 2 CAPSULE ORAL at 09:03

## 2021-03-30 RX ADMIN — HYDROMORPHONE HYDROCHLORIDE 0.5 MG: 2 INJECTION, SOLUTION INTRAMUSCULAR; INTRAVENOUS; SUBCUTANEOUS at 02:03

## 2021-03-30 RX ADMIN — Medication 30 MG: at 12:03

## 2021-03-30 RX ADMIN — PROPOFOL 200 MG: 10 INJECTION, EMULSION INTRAVENOUS at 12:03

## 2021-03-30 RX ADMIN — GLYCOPYRROLATE 0.4 MCG: 0.2 INJECTION INTRAMUSCULAR; INTRAVENOUS at 02:03

## 2021-03-30 RX ADMIN — LOPERAMIDE HYDROCHLORIDE 2 MG: 2 CAPSULE ORAL at 08:03

## 2021-03-30 RX ADMIN — MIDAZOLAM HYDROCHLORIDE 2 MG: 5 INJECTION, SOLUTION INTRAMUSCULAR; INTRAVENOUS at 12:03

## 2021-03-30 RX ADMIN — PANTOPRAZOLE SODIUM 40 MG: 40 INJECTION, POWDER, FOR SOLUTION INTRAVENOUS at 09:03

## 2021-03-30 RX ADMIN — CEFAZOLIN 2000 MG: 1 INJECTION, POWDER, FOR SOLUTION INTRAMUSCULAR; INTRAVENOUS; PARENTERAL at 12:03

## 2021-03-30 RX ADMIN — FENTANYL CITRATE 50 MCG: 50 INJECTION INTRAMUSCULAR; INTRAVENOUS at 12:03

## 2021-03-30 RX ADMIN — CHOLESTYRAMINE 4 G: 4 POWDER, FOR SUSPENSION ORAL at 08:03

## 2021-03-30 RX ADMIN — LIDOCAINE HYDROCHLORIDE 100 MG: 20 INJECTION, SOLUTION INTRAVENOUS at 12:03

## 2021-03-30 RX ADMIN — Medication 20 MG: at 01:03

## 2021-03-30 RX ADMIN — MORPHINE SULFATE 2 MG: 2 INJECTION, SOLUTION INTRAMUSCULAR; INTRAVENOUS at 07:03

## 2021-03-30 RX ADMIN — FENTANYL CITRATE 50 MCG: 50 INJECTION INTRAMUSCULAR; INTRAVENOUS at 01:03

## 2021-03-30 RX ADMIN — MEPERIDINE HYDROCHLORIDE 25 MG: 25 INJECTION INTRAMUSCULAR; INTRAVENOUS; SUBCUTANEOUS at 02:03

## 2021-03-30 RX ADMIN — HEPARIN SODIUM 5000 UNITS: 5000 INJECTION INTRAVENOUS; SUBCUTANEOUS at 08:03

## 2021-03-31 PROBLEM — D50.9 MICROCYTIC ANEMIA: Status: ACTIVE | Noted: 2021-03-31

## 2021-03-31 PROBLEM — N30.00 ACUTE CYSTITIS: Status: RESOLVED | Noted: 2021-03-27 | Resolved: 2021-03-31

## 2021-03-31 LAB
ABO: NORMAL
ANION GAP SERPL CALCULATED.3IONS-SCNC: 18 MMOL/L
ANISOCYTOSIS BLD QL SMEAR: ABNORMAL
ANTIBODY SCREEN: NORMAL
BASOPHILS # BLD AUTO: 0.02 X10E3/UL (ref 0–0.2)
BASOPHILS NFR BLD AUTO: 0.2 % (ref 0–1)
BLD PROD TYP BPU: NORMAL
BLD PROD TYP BPU: NORMAL
BUN SERPL-MCNC: 33 MG/DL (ref 7–18)
CALCIUM SERPL-MCNC: 7.4 MG/DL (ref 8.5–10.1)
CHLORIDE SERPL-SCNC: 107 MMOL/L (ref 98–107)
CO2 SERPL-SCNC: 17 MMOL/L (ref 21–32)
CREAT SERPL-MCNC: 2.42 MG/DL (ref 0.55–1.02)
EOSINOPHIL # BLD AUTO: 0.05 X10E3/UL (ref 0–0.5)
EOSINOPHIL NFR BLD AUTO: 0.6 % (ref 1–4)
ERYTHROCYTE [DISTWIDTH] IN BLOOD BY AUTOMATED COUNT: 24.2 % (ref 11.5–14.5)
FERRITIN SERPL-MCNC: 253 NG/ML (ref 8–252)
FOLATE SERPL-MCNC: 3.2 NG/ML (ref 3.1–17.5)
GLUCOSE SERPL-MCNC: 129 MG/DL (ref 70–105)
GLUCOSE SERPL-MCNC: 194 MG/DL (ref 70–105)
GLUCOSE SERPL-MCNC: 201 MG/DL (ref 74–106)
GLUCOSE SERPL-MCNC: 212 MG/DL (ref 70–105)
HCT VFR BLD AUTO: 21.2 % (ref 38–47)
HCT VFR BLD AUTO: 31.6 % (ref 38–47)
HGB BLD-MCNC: 10.1 G/DL (ref 12–16)
HGB BLD-MCNC: 6.6 G/DL (ref 12–16)
HYPOCHROMIA BLD QL SMEAR: SLIGHT
IMM GRANULOCYTES # BLD AUTO: 0.09 X10E3/UL (ref 0–0.04)
IMM GRANULOCYTES NFR BLD: 1 % (ref 0–0.4)
IMM RETICS NFR: 14.9 % (ref 3–15.9)
IRON SATN MFR SERPL: 11 % (ref 14–50)
IRON SERPL-MCNC: 24 UG/DL (ref 50–170)
LYMPHOCYTES # BLD AUTO: 1.47 X10E3/UL (ref 1–4.8)
LYMPHOCYTES NFR BLD AUTO: 16.6 % (ref 27–41)
MCH RBC QN AUTO: 24.4 PG (ref 27–31)
MCHC RBC AUTO-ENTMCNC: 31.1 G/DL (ref 32–36)
MCV RBC AUTO: 78.5 FL (ref 80–96)
MONOCYTES # BLD AUTO: 0.84 X10E3/UL (ref 0–0.8)
MONOCYTES NFR BLD AUTO: 9.5 % (ref 2–6)
MPC BLD CALC-MCNC: 9.9 FL (ref 9.4–12.4)
NEUTROPHILS # BLD AUTO: 6.36 X10E3/UL (ref 1.8–7.7)
NEUTROPHILS NFR BLD AUTO: 72.1 % (ref 53–65)
NRBC # BLD AUTO: 0 X10E3/UL (ref 0–0)
NRBC, AUTO (.00): 0 /100 (ref 0–0)
OCCULT BLOOD: NEGATIVE
PLATELET # BLD AUTO: 390 X10E3/UL (ref 150–400)
PLATELET MORPHOLOGY: ABNORMAL
POLYCHROMASIA BLD QL SMEAR: ABNORMAL
POTASSIUM SERPL-SCNC: 3.9 MMOL/L (ref 3.5–5.1)
RBC # BLD AUTO: 2.7 X10E6/UL (ref 4.2–5.4)
RETICS # AUTO: 0.07 X10E6/UL (ref 0.02–0.11)
RETICS/RBC NFR AUTO: 1.7 % (ref 0.4–2.2)
RH TYPE: NORMAL
SODIUM SERPL-SCNC: 138 MMOL/L (ref 136–145)
TIBC SERPL-MCNC: 228 UG/DL (ref 250–450)
UNIT ABO: NORMAL
UNIT ABO: NORMAL
UNIT NUMBER: NORMAL
UNIT NUMBER: NORMAL
UNIT RH: NORMAL
UNIT RH: NORMAL
UNIT STATUS: NORMAL
UNIT STATUS: NORMAL
VIT B12 SERPL-MCNC: 885 PG/ML (ref 193–986)
WBC # BLD AUTO: 8.83 X10E3/UL (ref 4.5–11)

## 2021-03-31 PROCEDURE — 82746 ASSAY OF FOLIC ACID SERUM: CPT

## 2021-03-31 PROCEDURE — 86901 BLOOD TYPING SEROLOGIC RH(D): CPT

## 2021-03-31 PROCEDURE — 63600175 PHARM REV CODE 636 W HCPCS: Performed by: FAMILY MEDICINE

## 2021-03-31 PROCEDURE — 94761 N-INVAS EAR/PLS OXIMETRY MLT: CPT

## 2021-03-31 PROCEDURE — 25000003 PHARM REV CODE 250: Performed by: FAMILY MEDICINE

## 2021-03-31 PROCEDURE — 36430 TRANSFUSION BLD/BLD COMPNT: CPT

## 2021-03-31 PROCEDURE — 85045 AUTOMATED RETICULOCYTE COUNT: CPT

## 2021-03-31 PROCEDURE — 85014 HEMATOCRIT: CPT

## 2021-03-31 PROCEDURE — P9016 RBC LEUKOCYTES REDUCED: HCPCS

## 2021-03-31 PROCEDURE — 25000003 PHARM REV CODE 250: Performed by: STUDENT IN AN ORGANIZED HEALTH CARE EDUCATION/TRAINING PROGRAM

## 2021-03-31 PROCEDURE — 82272 OCCULT BLD FECES 1-3 TESTS: CPT

## 2021-03-31 PROCEDURE — 83550 IRON BINDING TEST: CPT

## 2021-03-31 PROCEDURE — 96372 THER/PROPH/DIAG INJ SC/IM: CPT

## 2021-03-31 PROCEDURE — 86900 BLOOD TYPING SEROLOGIC ABO: CPT

## 2021-03-31 PROCEDURE — 82728 ASSAY OF FERRITIN: CPT

## 2021-03-31 PROCEDURE — 86923 COMPATIBILITY TEST ELECTRIC: CPT

## 2021-03-31 PROCEDURE — 82962 GLUCOSE BLOOD TEST: CPT

## 2021-03-31 PROCEDURE — 86850 RBC ANTIBODY SCREEN: CPT

## 2021-03-31 PROCEDURE — C9113 INJ PANTOPRAZOLE SODIUM, VIA: HCPCS | Performed by: FAMILY MEDICINE

## 2021-03-31 PROCEDURE — 85025 COMPLETE CBC W/AUTO DIFF WBC: CPT

## 2021-03-31 PROCEDURE — 99254 IP/OBS CNSLTJ NEW/EST MOD 60: CPT | Mod: ,,, | Performed by: STUDENT IN AN ORGANIZED HEALTH CARE EDUCATION/TRAINING PROGRAM

## 2021-03-31 PROCEDURE — 80048 BASIC METABOLIC PNL TOTAL CA: CPT

## 2021-03-31 PROCEDURE — 82607 VITAMIN B-12: CPT

## 2021-03-31 PROCEDURE — 99254 PR INITIAL INPATIENT CONSULT,LEVL IV: ICD-10-PCS | Mod: ,,, | Performed by: STUDENT IN AN ORGANIZED HEALTH CARE EDUCATION/TRAINING PROGRAM

## 2021-03-31 PROCEDURE — 36415 COLL VENOUS BLD VENIPUNCTURE: CPT

## 2021-03-31 PROCEDURE — 11000001 HC ACUTE MED/SURG PRIVATE ROOM

## 2021-03-31 PROCEDURE — 85018 HEMOGLOBIN: CPT

## 2021-03-31 RX ORDER — HYDROCODONE BITARTRATE AND ACETAMINOPHEN 500; 5 MG/1; MG/1
TABLET ORAL
Status: DISCONTINUED | OUTPATIENT
Start: 2021-03-31 | End: 2021-03-31

## 2021-03-31 RX ORDER — HYDROCODONE BITARTRATE AND ACETAMINOPHEN 500; 5 MG/1; MG/1
TABLET ORAL
Status: DISCONTINUED | OUTPATIENT
Start: 2021-03-31 | End: 2021-04-06 | Stop reason: HOSPADM

## 2021-03-31 RX ORDER — SODIUM CHLORIDE, SODIUM LACTATE, POTASSIUM CHLORIDE, CALCIUM CHLORIDE 600; 310; 30; 20 MG/100ML; MG/100ML; MG/100ML; MG/100ML
INJECTION, SOLUTION INTRAVENOUS CONTINUOUS
Status: DISCONTINUED | OUTPATIENT
Start: 2021-03-31 | End: 2021-04-05

## 2021-03-31 RX ADMIN — LOPERAMIDE HYDROCHLORIDE 2 MG: 2 CAPSULE ORAL at 08:03

## 2021-03-31 RX ADMIN — MORPHINE SULFATE 2 MG: 2 INJECTION, SOLUTION INTRAMUSCULAR; INTRAVENOUS at 12:03

## 2021-03-31 RX ADMIN — MORPHINE SULFATE 2 MG: 2 INJECTION, SOLUTION INTRAMUSCULAR; INTRAVENOUS at 05:03

## 2021-03-31 RX ADMIN — INSULIN ASPART 6 UNITS: 100 INJECTION, SOLUTION INTRAVENOUS; SUBCUTANEOUS at 06:03

## 2021-03-31 RX ADMIN — SODIUM CHLORIDE: 900 INJECTION, SOLUTION INTRAVENOUS at 03:03

## 2021-03-31 RX ADMIN — SODIUM CHLORIDE, POTASSIUM CHLORIDE, SODIUM LACTATE AND CALCIUM CHLORIDE 1000 ML: 600; 310; 30; 20 INJECTION, SOLUTION INTRAVENOUS at 12:03

## 2021-03-31 RX ADMIN — INSULIN ASPART 1 UNITS: 100 INJECTION, SOLUTION INTRAVENOUS; SUBCUTANEOUS at 12:03

## 2021-03-31 RX ADMIN — HEPARIN SODIUM 5000 UNITS: 5000 INJECTION INTRAVENOUS; SUBCUTANEOUS at 08:03

## 2021-03-31 RX ADMIN — SODIUM CHLORIDE: 9 INJECTION, SOLUTION INTRAVENOUS at 09:03

## 2021-03-31 RX ADMIN — ACETAMINOPHEN 650 MG: 325 TABLET ORAL at 12:03

## 2021-03-31 RX ADMIN — PANTOPRAZOLE SODIUM 40 MG: 40 INJECTION, POWDER, FOR SOLUTION INTRAVENOUS at 08:03

## 2021-03-31 RX ADMIN — CHOLESTYRAMINE 4 G: 4 POWDER, FOR SUSPENSION ORAL at 08:03

## 2021-04-01 LAB
ANION GAP SERPL CALCULATED.3IONS-SCNC: 19 MMOL/L
BASOPHILS # BLD AUTO: 0.03 X10E3/UL (ref 0–0.2)
BASOPHILS NFR BLD AUTO: 0.3 % (ref 0–1)
BUN SERPL-MCNC: 26 MG/DL (ref 7–18)
CALCIUM SERPL-MCNC: 7.5 MG/DL (ref 8.5–10.1)
CHLORIDE SERPL-SCNC: 107 MMOL/L (ref 98–107)
CO2 SERPL-SCNC: 15 MMOL/L (ref 21–32)
CREAT SERPL-MCNC: 1.82 MG/DL (ref 0.55–1.02)
EOSINOPHIL # BLD AUTO: 0.05 X10E3/UL (ref 0–0.5)
EOSINOPHIL NFR BLD AUTO: 0.5 % (ref 1–4)
ERYTHROCYTE [DISTWIDTH] IN BLOOD BY AUTOMATED COUNT: 21 % (ref 11.5–14.5)
EST. AVERAGE GLUCOSE BLD GHB EST-MCNC: 144 MG/DL
GLUCOSE SERPL-MCNC: 155 MG/DL (ref 70–105)
GLUCOSE SERPL-MCNC: 173 MG/DL (ref 70–105)
GLUCOSE SERPL-MCNC: 198 MG/DL (ref 70–105)
GLUCOSE SERPL-MCNC: 199 MG/DL (ref 74–106)
GLUCOSE SERPL-MCNC: 201 MG/DL (ref 70–105)
HBA1C MFR BLD HPLC: 6.9 %
HCT VFR BLD AUTO: 28.7 % (ref 38–47)
HGB BLD-MCNC: 9.3 G/DL (ref 12–16)
IMM GRANULOCYTES # BLD AUTO: 0.1 X10E3/UL (ref 0–0.04)
IMM GRANULOCYTES NFR BLD: 1 % (ref 0–0.4)
LYMPHOCYTES # BLD AUTO: 1.9 X10E3/UL (ref 1–4.8)
LYMPHOCYTES NFR BLD AUTO: 19.4 % (ref 27–41)
MCH RBC QN AUTO: 26.2 PG (ref 27–31)
MCHC RBC AUTO-ENTMCNC: 32.4 G/DL (ref 32–36)
MCV RBC AUTO: 80.8 FL (ref 80–96)
MONOCYTES # BLD AUTO: 0.86 X10E3/UL (ref 0–0.8)
MONOCYTES NFR BLD AUTO: 8.8 % (ref 2–6)
MPC BLD CALC-MCNC: 9.7 FL (ref 9.4–12.4)
NEUTROPHILS # BLD AUTO: 6.85 X10E3/UL (ref 1.8–7.7)
NEUTROPHILS NFR BLD AUTO: 70 % (ref 53–65)
NRBC # BLD AUTO: 0 X10E3/UL (ref 0–0)
NRBC, AUTO (.00): 0 /100 (ref 0–0)
PLATELET # BLD AUTO: 369 X10E3/UL (ref 150–400)
POTASSIUM SERPL-SCNC: 4 MMOL/L (ref 3.5–5.1)
RBC # BLD AUTO: 3.55 X10E6/UL (ref 4.2–5.4)
REPORT: NORMAL
REPORT: NORMAL
SODIUM SERPL-SCNC: 137 MMOL/L (ref 136–145)
WBC # BLD AUTO: 9.79 X10E3/UL (ref 4.5–11)

## 2021-04-01 PROCEDURE — 82962 GLUCOSE BLOOD TEST: CPT

## 2021-04-01 PROCEDURE — 97162 PT EVAL MOD COMPLEX 30 MIN: CPT

## 2021-04-01 PROCEDURE — 36415 COLL VENOUS BLD VENIPUNCTURE: CPT

## 2021-04-01 PROCEDURE — 63600175 PHARM REV CODE 636 W HCPCS: Performed by: FAMILY MEDICINE

## 2021-04-01 PROCEDURE — 97166 OT EVAL MOD COMPLEX 45 MIN: CPT

## 2021-04-01 PROCEDURE — 83036 HEMOGLOBIN GLYCOSYLATED A1C: CPT

## 2021-04-01 PROCEDURE — 11000001 HC ACUTE MED/SURG PRIVATE ROOM

## 2021-04-01 PROCEDURE — 85025 COMPLETE CBC W/AUTO DIFF WBC: CPT

## 2021-04-01 PROCEDURE — 99231 PR SUBSEQUENT HOSPITAL CARE,LEVL I: ICD-10-PCS | Mod: ,,, | Performed by: STUDENT IN AN ORGANIZED HEALTH CARE EDUCATION/TRAINING PROGRAM

## 2021-04-01 PROCEDURE — 25000003 PHARM REV CODE 250: Performed by: STUDENT IN AN ORGANIZED HEALTH CARE EDUCATION/TRAINING PROGRAM

## 2021-04-01 PROCEDURE — C9113 INJ PANTOPRAZOLE SODIUM, VIA: HCPCS | Performed by: FAMILY MEDICINE

## 2021-04-01 PROCEDURE — 80048 BASIC METABOLIC PNL TOTAL CA: CPT

## 2021-04-01 PROCEDURE — 94760 N-INVAS EAR/PLS OXIMETRY 1: CPT

## 2021-04-01 PROCEDURE — 99231 SBSQ HOSP IP/OBS SF/LOW 25: CPT | Mod: ,,, | Performed by: STUDENT IN AN ORGANIZED HEALTH CARE EDUCATION/TRAINING PROGRAM

## 2021-04-01 RX ADMIN — INSULIN ASPART 2 UNITS: 100 INJECTION, SOLUTION INTRAVENOUS; SUBCUTANEOUS at 06:04

## 2021-04-01 RX ADMIN — PSYLLIUM HUSK 1 PACKET: 3.4 POWDER ORAL at 08:04

## 2021-04-01 RX ADMIN — MORPHINE SULFATE 2 MG: 2 INJECTION, SOLUTION INTRAMUSCULAR; INTRAVENOUS at 01:04

## 2021-04-01 RX ADMIN — CHOLESTYRAMINE 4 G: 4 POWDER, FOR SUSPENSION ORAL at 09:04

## 2021-04-01 RX ADMIN — LOPERAMIDE HYDROCHLORIDE 2 MG: 2 CAPSULE ORAL at 08:04

## 2021-04-01 RX ADMIN — PSYLLIUM HUSK 1 PACKET: 3.4 POWDER ORAL at 03:04

## 2021-04-01 RX ADMIN — SODIUM CHLORIDE, POTASSIUM CHLORIDE, SODIUM LACTATE AND CALCIUM CHLORIDE: 600; 310; 30; 20 INJECTION, SOLUTION INTRAVENOUS at 06:04

## 2021-04-01 RX ADMIN — HEPARIN SODIUM 5000 UNITS: 5000 INJECTION INTRAVENOUS; SUBCUTANEOUS at 08:04

## 2021-04-01 RX ADMIN — PANTOPRAZOLE SODIUM 40 MG: 40 INJECTION, POWDER, FOR SOLUTION INTRAVENOUS at 09:04

## 2021-04-01 RX ADMIN — HEPARIN SODIUM 5000 UNITS: 5000 INJECTION INTRAVENOUS; SUBCUTANEOUS at 09:04

## 2021-04-01 RX ADMIN — INSULIN ASPART 4 UNITS: 100 INJECTION, SOLUTION INTRAVENOUS; SUBCUTANEOUS at 05:04

## 2021-04-01 RX ADMIN — INSULIN ASPART 2 UNITS: 100 INJECTION, SOLUTION INTRAVENOUS; SUBCUTANEOUS at 12:04

## 2021-04-01 RX ADMIN — LOPERAMIDE HYDROCHLORIDE 2 MG: 2 CAPSULE ORAL at 09:04

## 2021-04-02 LAB
ANION GAP SERPL CALCULATED.3IONS-SCNC: 19 MMOL/L
BASOPHILS # BLD AUTO: 0.03 X10E3/UL (ref 0–0.2)
BASOPHILS NFR BLD AUTO: 0.3 % (ref 0–1)
BUN SERPL-MCNC: 21 MG/DL (ref 7–18)
CALCIUM SERPL-MCNC: 7.8 MG/DL (ref 8.5–10.1)
CHLORIDE SERPL-SCNC: 106 MMOL/L (ref 98–107)
CO2 SERPL-SCNC: 16 MMOL/L (ref 21–32)
CREAT SERPL-MCNC: 1.51 MG/DL (ref 0.55–1.02)
EOSINOPHIL # BLD AUTO: 0.09 X10E3/UL (ref 0–0.5)
EOSINOPHIL NFR BLD AUTO: 0.9 % (ref 1–4)
ERYTHROCYTE [DISTWIDTH] IN BLOOD BY AUTOMATED COUNT: 21.3 % (ref 11.5–14.5)
GLUCOSE SERPL-MCNC: 130 MG/DL (ref 70–105)
GLUCOSE SERPL-MCNC: 133 MG/DL (ref 74–106)
GLUCOSE SERPL-MCNC: 188 MG/DL (ref 70–105)
HCT VFR BLD AUTO: 29.9 % (ref 38–47)
HGB BLD-MCNC: 9.7 G/DL (ref 12–16)
IMM GRANULOCYTES # BLD AUTO: 0.17 X10E3/UL (ref 0–0.04)
IMM GRANULOCYTES NFR BLD: 1.7 % (ref 0–0.4)
LYMPHOCYTES # BLD AUTO: 2.54 X10E3/UL (ref 1–4.8)
LYMPHOCYTES NFR BLD AUTO: 26.1 % (ref 27–41)
MCH RBC QN AUTO: 26.4 PG (ref 27–31)
MCHC RBC AUTO-ENTMCNC: 32.4 G/DL (ref 32–36)
MCV RBC AUTO: 81.3 FL (ref 80–96)
MONOCYTES # BLD AUTO: 0.83 X10E3/UL (ref 0–0.8)
MONOCYTES NFR BLD AUTO: 8.5 % (ref 2–6)
MPC BLD CALC-MCNC: 9.5 FL (ref 9.4–12.4)
NEUTROPHILS # BLD AUTO: 6.08 X10E3/UL (ref 1.8–7.7)
NEUTROPHILS NFR BLD AUTO: 62.5 % (ref 53–65)
NRBC # BLD AUTO: 0 X10E3/UL (ref 0–0)
NRBC, AUTO (.00): 0 /100 (ref 0–0)
PLATELET # BLD AUTO: 401 X10E3/UL (ref 150–400)
POTASSIUM SERPL-SCNC: 4.9 MMOL/L (ref 3.5–5.1)
RBC # BLD AUTO: 3.68 X10E6/UL (ref 4.2–5.4)
SODIUM SERPL-SCNC: 136 MMOL/L (ref 136–145)
WBC # BLD AUTO: 9.74 X10E3/UL (ref 4.5–11)

## 2021-04-02 PROCEDURE — 25000003 PHARM REV CODE 250: Performed by: FAMILY MEDICINE

## 2021-04-02 PROCEDURE — 11000001 HC ACUTE MED/SURG PRIVATE ROOM

## 2021-04-02 PROCEDURE — 63600175 PHARM REV CODE 636 W HCPCS: Performed by: FAMILY MEDICINE

## 2021-04-02 PROCEDURE — 97110 THERAPEUTIC EXERCISES: CPT

## 2021-04-02 PROCEDURE — 80048 BASIC METABOLIC PNL TOTAL CA: CPT

## 2021-04-02 PROCEDURE — 82962 GLUCOSE BLOOD TEST: CPT

## 2021-04-02 PROCEDURE — 85025 COMPLETE CBC W/AUTO DIFF WBC: CPT

## 2021-04-02 PROCEDURE — 36415 COLL VENOUS BLD VENIPUNCTURE: CPT

## 2021-04-02 PROCEDURE — 97530 THERAPEUTIC ACTIVITIES: CPT

## 2021-04-02 PROCEDURE — C9113 INJ PANTOPRAZOLE SODIUM, VIA: HCPCS | Performed by: FAMILY MEDICINE

## 2021-04-02 PROCEDURE — 25000003 PHARM REV CODE 250: Performed by: STUDENT IN AN ORGANIZED HEALTH CARE EDUCATION/TRAINING PROGRAM

## 2021-04-02 PROCEDURE — 94761 N-INVAS EAR/PLS OXIMETRY MLT: CPT

## 2021-04-02 RX ADMIN — PSYLLIUM HUSK 1 PACKET: 3.4 POWDER ORAL at 04:04

## 2021-04-02 RX ADMIN — LOPERAMIDE HYDROCHLORIDE 2 MG: 2 CAPSULE ORAL at 10:04

## 2021-04-02 RX ADMIN — HEPARIN SODIUM 5000 UNITS: 5000 INJECTION INTRAVENOUS; SUBCUTANEOUS at 08:04

## 2021-04-02 RX ADMIN — ACETAMINOPHEN 650 MG: 325 TABLET ORAL at 08:04

## 2021-04-02 RX ADMIN — PSYLLIUM HUSK 1 PACKET: 3.4 POWDER ORAL at 08:04

## 2021-04-02 RX ADMIN — PANTOPRAZOLE SODIUM 40 MG: 40 INJECTION, POWDER, FOR SOLUTION INTRAVENOUS at 10:04

## 2021-04-02 RX ADMIN — HEPARIN SODIUM 5000 UNITS: 5000 INJECTION INTRAVENOUS; SUBCUTANEOUS at 10:04

## 2021-04-02 RX ADMIN — SODIUM CHLORIDE, POTASSIUM CHLORIDE, SODIUM LACTATE AND CALCIUM CHLORIDE: 600; 310; 30; 20 INJECTION, SOLUTION INTRAVENOUS at 09:04

## 2021-04-02 RX ADMIN — LOPERAMIDE HYDROCHLORIDE 2 MG: 2 CAPSULE ORAL at 08:04

## 2021-04-02 RX ADMIN — PSYLLIUM HUSK 1 PACKET: 3.4 POWDER ORAL at 10:04

## 2021-04-02 RX ADMIN — MORPHINE SULFATE 2 MG: 2 INJECTION, SOLUTION INTRAMUSCULAR; INTRAVENOUS at 01:04

## 2021-04-02 RX ADMIN — INSULIN ASPART 1 UNITS: 100 INJECTION, SOLUTION INTRAVENOUS; SUBCUTANEOUS at 01:04

## 2021-04-02 RX ADMIN — SODIUM CHLORIDE, POTASSIUM CHLORIDE, SODIUM LACTATE AND CALCIUM CHLORIDE: 600; 310; 30; 20 INJECTION, SOLUTION INTRAVENOUS at 01:04

## 2021-04-03 PROBLEM — R11.10 ABDOMINAL PAIN WITH VOMITING: Status: RESOLVED | Noted: 2021-03-26 | Resolved: 2021-04-03

## 2021-04-03 PROBLEM — R10.9 ABDOMINAL PAIN WITH VOMITING: Status: RESOLVED | Noted: 2021-03-26 | Resolved: 2021-04-03

## 2021-04-03 PROBLEM — B37.31 VAGINAL YEAST INFECTION: Status: ACTIVE | Noted: 2021-04-03

## 2021-04-03 LAB
ANION GAP SERPL CALCULATED.3IONS-SCNC: 19 MMOL/L
BASOPHILS # BLD AUTO: 0.03 X10E3/UL (ref 0–0.2)
BASOPHILS NFR BLD AUTO: 0.4 % (ref 0–1)
BUN SERPL-MCNC: 25 MG/DL (ref 7–18)
CALCIUM SERPL-MCNC: 7.7 MG/DL (ref 8.5–10.1)
CHLORIDE SERPL-SCNC: 105 MMOL/L (ref 98–107)
CO2 SERPL-SCNC: 15 MMOL/L (ref 21–32)
CREAT SERPL-MCNC: 1.58 MG/DL (ref 0.55–1.02)
EOSINOPHIL # BLD AUTO: 0.11 X10E3/UL (ref 0–0.5)
EOSINOPHIL NFR BLD AUTO: 1.3 % (ref 1–4)
ERYTHROCYTE [DISTWIDTH] IN BLOOD BY AUTOMATED COUNT: 21.1 % (ref 11.5–14.5)
GLUCOSE SERPL-MCNC: 145 MG/DL (ref 70–105)
GLUCOSE SERPL-MCNC: 178 MG/DL (ref 70–105)
GLUCOSE SERPL-MCNC: 185 MG/DL (ref 74–106)
GLUCOSE SERPL-MCNC: 186 MG/DL (ref 70–105)
GLUCOSE SERPL-MCNC: 196 MG/DL (ref 70–105)
GLUCOSE SERPL-MCNC: 198 MG/DL (ref 70–105)
HCT VFR BLD AUTO: 29.8 % (ref 38–47)
HGB BLD-MCNC: 9.6 G/DL (ref 12–16)
IMM GRANULOCYTES # BLD AUTO: 0.15 X10E3/UL (ref 0–0.04)
IMM GRANULOCYTES NFR BLD: 1.8 % (ref 0–0.4)
LYMPHOCYTES # BLD AUTO: 2.11 X10E3/UL (ref 1–4.8)
LYMPHOCYTES NFR BLD AUTO: 25.3 % (ref 27–41)
MCH RBC QN AUTO: 26.2 PG (ref 27–31)
MCHC RBC AUTO-ENTMCNC: 32.2 G/DL (ref 32–36)
MCV RBC AUTO: 81.4 FL (ref 80–96)
MONOCYTES # BLD AUTO: 0.7 X10E3/UL (ref 0–0.8)
MONOCYTES NFR BLD AUTO: 8.4 % (ref 2–6)
MPC BLD CALC-MCNC: 9.5 FL (ref 9.4–12.4)
NEUTROPHILS # BLD AUTO: 5.25 X10E3/UL (ref 1.8–7.7)
NEUTROPHILS NFR BLD AUTO: 62.8 % (ref 53–65)
NRBC # BLD AUTO: 0 X10E3/UL (ref 0–0)
NRBC, AUTO (.00): 0 /100 (ref 0–0)
PLATELET # BLD AUTO: 395 X10E3/UL (ref 150–400)
POTASSIUM SERPL-SCNC: 5.1 MMOL/L (ref 3.5–5.1)
RBC # BLD AUTO: 3.66 X10E6/UL (ref 4.2–5.4)
SODIUM SERPL-SCNC: 134 MMOL/L (ref 136–145)
WBC # BLD AUTO: 8.35 X10E3/UL (ref 4.5–11)

## 2021-04-03 PROCEDURE — 80048 BASIC METABOLIC PNL TOTAL CA: CPT

## 2021-04-03 PROCEDURE — 85025 COMPLETE CBC W/AUTO DIFF WBC: CPT

## 2021-04-03 PROCEDURE — 94761 N-INVAS EAR/PLS OXIMETRY MLT: CPT

## 2021-04-03 PROCEDURE — C9113 INJ PANTOPRAZOLE SODIUM, VIA: HCPCS | Performed by: FAMILY MEDICINE

## 2021-04-03 PROCEDURE — 63700000 PHARM REV CODE 250 ALT 637 W/O HCPCS: Performed by: FAMILY MEDICINE

## 2021-04-03 PROCEDURE — C9399 UNCLASSIFIED DRUGS OR BIOLOG: HCPCS | Performed by: FAMILY MEDICINE

## 2021-04-03 PROCEDURE — 63600175 PHARM REV CODE 636 W HCPCS: Performed by: FAMILY MEDICINE

## 2021-04-03 PROCEDURE — 82962 GLUCOSE BLOOD TEST: CPT

## 2021-04-03 PROCEDURE — 36415 COLL VENOUS BLD VENIPUNCTURE: CPT

## 2021-04-03 PROCEDURE — 11000001 HC ACUTE MED/SURG PRIVATE ROOM

## 2021-04-03 PROCEDURE — 25000003 PHARM REV CODE 250: Performed by: FAMILY MEDICINE

## 2021-04-03 PROCEDURE — 25000003 PHARM REV CODE 250: Performed by: STUDENT IN AN ORGANIZED HEALTH CARE EDUCATION/TRAINING PROGRAM

## 2021-04-03 RX ORDER — FLUCONAZOLE 100 MG/1
100 TABLET ORAL DAILY
Status: DISCONTINUED | OUTPATIENT
Start: 2021-04-03 | End: 2021-04-05

## 2021-04-03 RX ADMIN — PSYLLIUM HUSK 1 PACKET: 3.4 POWDER ORAL at 09:04

## 2021-04-03 RX ADMIN — PSYLLIUM HUSK 1 PACKET: 3.4 POWDER ORAL at 03:04

## 2021-04-03 RX ADMIN — LOPERAMIDE HYDROCHLORIDE 2 MG: 2 CAPSULE ORAL at 09:04

## 2021-04-03 RX ADMIN — SODIUM CHLORIDE, POTASSIUM CHLORIDE, SODIUM LACTATE AND CALCIUM CHLORIDE: 600; 310; 30; 20 INJECTION, SOLUTION INTRAVENOUS at 04:04

## 2021-04-03 RX ADMIN — INSULIN ASPART 2 UNITS: 100 INJECTION, SOLUTION INTRAVENOUS; SUBCUTANEOUS at 06:04

## 2021-04-03 RX ADMIN — INSULIN ASPART 2 UNITS: 100 INJECTION, SOLUTION INTRAVENOUS; SUBCUTANEOUS at 12:04

## 2021-04-03 RX ADMIN — LOPERAMIDE HYDROCHLORIDE 2 MG: 2 CAPSULE ORAL at 08:04

## 2021-04-03 RX ADMIN — INSULIN ASPART 2 UNITS: 100 INJECTION, SOLUTION INTRAVENOUS; SUBCUTANEOUS at 05:04

## 2021-04-03 RX ADMIN — MORPHINE SULFATE 2 MG: 2 INJECTION, SOLUTION INTRAMUSCULAR; INTRAVENOUS at 08:04

## 2021-04-03 RX ADMIN — INSULIN DETEMIR 5 UNITS: 100 INJECTION, SOLUTION SUBCUTANEOUS at 08:04

## 2021-04-03 RX ADMIN — HEPARIN SODIUM 5000 UNITS: 5000 INJECTION INTRAVENOUS; SUBCUTANEOUS at 08:04

## 2021-04-03 RX ADMIN — FLUCONAZOLE 100 MG: 100 TABLET ORAL at 12:04

## 2021-04-03 RX ADMIN — ACETAMINOPHEN 650 MG: 325 TABLET ORAL at 10:04

## 2021-04-03 RX ADMIN — HEPARIN SODIUM 5000 UNITS: 5000 INJECTION INTRAVENOUS; SUBCUTANEOUS at 09:04

## 2021-04-03 RX ADMIN — INSULIN ASPART 1 UNITS: 100 INJECTION, SOLUTION INTRAVENOUS; SUBCUTANEOUS at 12:04

## 2021-04-03 RX ADMIN — PANTOPRAZOLE SODIUM 40 MG: 40 INJECTION, POWDER, FOR SOLUTION INTRAVENOUS at 09:04

## 2021-04-04 LAB
ANION GAP SERPL CALCULATED.3IONS-SCNC: 18 MMOL/L
BASOPHILS # BLD AUTO: 0.04 X10E3/UL (ref 0–0.2)
BASOPHILS NFR BLD AUTO: 0.4 % (ref 0–1)
BUN SERPL-MCNC: 37 MG/DL (ref 7–18)
CALCIUM SERPL-MCNC: 8 MG/DL (ref 8.5–10.1)
CHLORIDE SERPL-SCNC: 106 MMOL/L (ref 98–107)
CO2 SERPL-SCNC: 16 MMOL/L (ref 21–32)
CREAT SERPL-MCNC: 1.53 MG/DL (ref 0.55–1.02)
EOSINOPHIL # BLD AUTO: 0.14 X10E3/UL (ref 0–0.5)
EOSINOPHIL NFR BLD AUTO: 1.6 % (ref 1–4)
ERYTHROCYTE [DISTWIDTH] IN BLOOD BY AUTOMATED COUNT: 21.6 % (ref 11.5–14.5)
GLUCOSE SERPL-MCNC: 132 MG/DL (ref 70–105)
GLUCOSE SERPL-MCNC: 151 MG/DL (ref 70–105)
GLUCOSE SERPL-MCNC: 155 MG/DL (ref 74–106)
GLUCOSE SERPL-MCNC: 212 MG/DL (ref 70–105)
HCT VFR BLD AUTO: 31.9 % (ref 38–47)
HGB BLD-MCNC: 10.3 G/DL (ref 12–16)
IMM GRANULOCYTES # BLD AUTO: 0.16 X10E3/UL (ref 0–0.04)
IMM GRANULOCYTES NFR BLD: 1.8 % (ref 0–0.4)
LYMPHOCYTES # BLD AUTO: 2.37 X10E3/UL (ref 1–4.8)
LYMPHOCYTES NFR BLD AUTO: 26.6 % (ref 27–41)
MCH RBC QN AUTO: 26.3 PG (ref 27–31)
MCHC RBC AUTO-ENTMCNC: 32.3 G/DL (ref 32–36)
MCV RBC AUTO: 81.6 FL (ref 80–96)
MONOCYTES # BLD AUTO: 0.63 X10E3/UL (ref 0–0.8)
MONOCYTES NFR BLD AUTO: 7.1 % (ref 2–6)
MPC BLD CALC-MCNC: 9.5 FL (ref 9.4–12.4)
NEUTROPHILS # BLD AUTO: 5.56 X10E3/UL (ref 1.8–7.7)
NEUTROPHILS NFR BLD AUTO: 62.5 % (ref 53–65)
NRBC # BLD AUTO: 0 X10E3/UL (ref 0–0)
NRBC, AUTO (.00): 0 /100 (ref 0–0)
PLATELET # BLD AUTO: 433 X10E3/UL (ref 150–400)
POTASSIUM SERPL-SCNC: 5.4 MMOL/L (ref 3.5–5.1)
RBC # BLD AUTO: 3.91 X10E6/UL (ref 4.2–5.4)
SODIUM SERPL-SCNC: 135 MMOL/L (ref 136–145)
WBC # BLD AUTO: 8.9 X10E3/UL (ref 4.5–11)

## 2021-04-04 PROCEDURE — 36415 COLL VENOUS BLD VENIPUNCTURE: CPT

## 2021-04-04 PROCEDURE — C9113 INJ PANTOPRAZOLE SODIUM, VIA: HCPCS | Performed by: FAMILY MEDICINE

## 2021-04-04 PROCEDURE — C9399 UNCLASSIFIED DRUGS OR BIOLOG: HCPCS | Performed by: FAMILY MEDICINE

## 2021-04-04 PROCEDURE — 94761 N-INVAS EAR/PLS OXIMETRY MLT: CPT

## 2021-04-04 PROCEDURE — 63600175 PHARM REV CODE 636 W HCPCS: Performed by: FAMILY MEDICINE

## 2021-04-04 PROCEDURE — 63700000 PHARM REV CODE 250 ALT 637 W/O HCPCS: Performed by: FAMILY MEDICINE

## 2021-04-04 PROCEDURE — 80048 BASIC METABOLIC PNL TOTAL CA: CPT

## 2021-04-04 PROCEDURE — 25000003 PHARM REV CODE 250: Performed by: STUDENT IN AN ORGANIZED HEALTH CARE EDUCATION/TRAINING PROGRAM

## 2021-04-04 PROCEDURE — 82962 GLUCOSE BLOOD TEST: CPT

## 2021-04-04 PROCEDURE — 11000001 HC ACUTE MED/SURG PRIVATE ROOM

## 2021-04-04 PROCEDURE — 85025 COMPLETE CBC W/AUTO DIFF WBC: CPT

## 2021-04-04 RX ADMIN — SODIUM CHLORIDE, POTASSIUM CHLORIDE, SODIUM LACTATE AND CALCIUM CHLORIDE: 600; 310; 30; 20 INJECTION, SOLUTION INTRAVENOUS at 02:04

## 2021-04-04 RX ADMIN — PSYLLIUM HUSK 1 PACKET: 3.4 POWDER ORAL at 09:04

## 2021-04-04 RX ADMIN — SODIUM CHLORIDE, POTASSIUM CHLORIDE, SODIUM LACTATE AND CALCIUM CHLORIDE: 600; 310; 30; 20 INJECTION, SOLUTION INTRAVENOUS at 04:04

## 2021-04-04 RX ADMIN — INSULIN DETEMIR 5 UNITS: 100 INJECTION, SOLUTION SUBCUTANEOUS at 11:04

## 2021-04-04 RX ADMIN — HEPARIN SODIUM 5000 UNITS: 5000 INJECTION INTRAVENOUS; SUBCUTANEOUS at 09:04

## 2021-04-04 RX ADMIN — FLUCONAZOLE 100 MG: 100 TABLET ORAL at 09:04

## 2021-04-04 RX ADMIN — INSULIN ASPART 2 UNITS: 100 INJECTION, SOLUTION INTRAVENOUS; SUBCUTANEOUS at 11:04

## 2021-04-04 RX ADMIN — PANTOPRAZOLE SODIUM 40 MG: 40 INJECTION, POWDER, FOR SOLUTION INTRAVENOUS at 09:04

## 2021-04-04 RX ADMIN — PSYLLIUM HUSK 1 PACKET: 3.4 POWDER ORAL at 02:04

## 2021-04-04 RX ADMIN — INSULIN ASPART 2 UNITS: 100 INJECTION, SOLUTION INTRAVENOUS; SUBCUTANEOUS at 12:04

## 2021-04-04 RX ADMIN — LOPERAMIDE HYDROCHLORIDE 2 MG: 2 CAPSULE ORAL at 11:04

## 2021-04-04 RX ADMIN — LOPERAMIDE HYDROCHLORIDE 2 MG: 2 CAPSULE ORAL at 09:04

## 2021-04-04 RX ADMIN — HEPARIN SODIUM 5000 UNITS: 5000 INJECTION INTRAVENOUS; SUBCUTANEOUS at 11:04

## 2021-04-05 PROBLEM — E87.5 HYPERKALEMIA: Status: ACTIVE | Noted: 2021-04-05

## 2021-04-05 PROBLEM — I10 HTN (HYPERTENSION): Status: RESOLVED | Noted: 2021-03-19 | Resolved: 2021-04-05

## 2021-04-05 PROBLEM — B37.31 VAGINAL YEAST INFECTION: Status: RESOLVED | Noted: 2021-04-03 | Resolved: 2021-04-05

## 2021-04-05 LAB
ANION GAP SERPL CALCULATED.3IONS-SCNC: 18 MMOL/L
BASOPHILS # BLD AUTO: 0.04 X10E3/UL (ref 0–0.2)
BASOPHILS NFR BLD AUTO: 0.5 % (ref 0–1)
BUN SERPL-MCNC: 36 MG/DL (ref 7–18)
CALCIUM SERPL-MCNC: 7.7 MG/DL (ref 8.5–10.1)
CHLORIDE SERPL-SCNC: 107 MMOL/L (ref 98–107)
CO2 SERPL-SCNC: 14 MMOL/L (ref 21–32)
CREAT SERPL-MCNC: 1.47 MG/DL (ref 0.55–1.02)
EOSINOPHIL # BLD AUTO: 0.11 X10E3/UL (ref 0–0.5)
EOSINOPHIL NFR BLD AUTO: 1.3 % (ref 1–4)
ERYTHROCYTE [DISTWIDTH] IN BLOOD BY AUTOMATED COUNT: 21.8 % (ref 11.5–14.5)
GLUCOSE SERPL-MCNC: 123 MG/DL (ref 70–105)
GLUCOSE SERPL-MCNC: 131 MG/DL (ref 74–106)
GLUCOSE SERPL-MCNC: 156 MG/DL (ref 70–105)
GLUCOSE SERPL-MCNC: 167 MG/DL (ref 70–105)
GLUCOSE SERPL-MCNC: 169 MG/DL (ref 70–105)
HCO3 UR-SCNC: 12.4 MMOL/L (ref 24–28)
HCT VFR BLD AUTO: 30.7 % (ref 38–47)
HGB BLD-MCNC: 9.8 G/DL (ref 12–16)
IMM GRANULOCYTES # BLD AUTO: 0.15 X10E3/UL (ref 0–0.04)
IMM GRANULOCYTES NFR BLD: 1.8 % (ref 0–0.4)
LYMPHOCYTES # BLD AUTO: 2.31 X10E3/UL (ref 1–4.8)
LYMPHOCYTES NFR BLD AUTO: 27.8 % (ref 27–41)
MCH RBC QN AUTO: 25.7 PG (ref 27–31)
MCHC RBC AUTO-ENTMCNC: 31.9 G/DL (ref 32–36)
MCV RBC AUTO: 80.6 FL (ref 80–96)
MONOCYTES # BLD AUTO: 0.73 X10E3/UL (ref 0–0.8)
MONOCYTES NFR BLD AUTO: 8.8 % (ref 2–6)
MPC BLD CALC-MCNC: 9.6 FL (ref 9.4–12.4)
NEUTROPHILS # BLD AUTO: 4.97 X10E3/UL (ref 1.8–7.7)
NEUTROPHILS NFR BLD AUTO: 59.8 % (ref 53–65)
NRBC # BLD AUTO: 0 X10E3/UL (ref 0–0)
NRBC, AUTO (.00): 0 /100 (ref 0–0)
PCO2 BLDA: 24 MMHG (ref 35–48)
PH SMN: 7.32 [PH] (ref 7.35–7.45)
PLATELET # BLD AUTO: 394 X10E3/UL (ref 150–400)
PO2 BLDA: 93 MMHG
POC BASE EXCESS: -11.9 MMOL/L (ref -2–2)
POC SATURATED O2: 97 % (ref 40–70)
POTASSIUM SERPL-SCNC: 5.5 MMOL/L (ref 3.5–5.1)
POTASSIUM SERPL-SCNC: 5.8 MMOL/L (ref 3.5–5.1)
RBC # BLD AUTO: 3.81 X10E6/UL (ref 4.2–5.4)
SODIUM SERPL-SCNC: 133 MMOL/L (ref 136–145)
WBC # BLD AUTO: 8.31 X10E3/UL (ref 4.5–11)

## 2021-04-05 PROCEDURE — 63600175 PHARM REV CODE 636 W HCPCS: Performed by: FAMILY MEDICINE

## 2021-04-05 PROCEDURE — 93010 ELECTROCARDIOGRAM REPORT: CPT | Mod: ,,, | Performed by: INTERNAL MEDICINE

## 2021-04-05 PROCEDURE — C9113 INJ PANTOPRAZOLE SODIUM, VIA: HCPCS | Performed by: FAMILY MEDICINE

## 2021-04-05 PROCEDURE — 97116 GAIT TRAINING THERAPY: CPT

## 2021-04-05 PROCEDURE — 94761 N-INVAS EAR/PLS OXIMETRY MLT: CPT

## 2021-04-05 PROCEDURE — 85025 COMPLETE CBC W/AUTO DIFF WBC: CPT

## 2021-04-05 PROCEDURE — 97110 THERAPEUTIC EXERCISES: CPT

## 2021-04-05 PROCEDURE — 97530 THERAPEUTIC ACTIVITIES: CPT

## 2021-04-05 PROCEDURE — 63600175 PHARM REV CODE 636 W HCPCS: Performed by: INTERNAL MEDICINE

## 2021-04-05 PROCEDURE — 94760 N-INVAS EAR/PLS OXIMETRY 1: CPT

## 2021-04-05 PROCEDURE — 99900035 HC TECH TIME PER 15 MIN (STAT)

## 2021-04-05 PROCEDURE — 25000003 PHARM REV CODE 250: Performed by: FAMILY MEDICINE

## 2021-04-05 PROCEDURE — 25000003 PHARM REV CODE 250: Performed by: STUDENT IN AN ORGANIZED HEALTH CARE EDUCATION/TRAINING PROGRAM

## 2021-04-05 PROCEDURE — 93010 EKG 12-LEAD: ICD-10-PCS | Mod: ,,, | Performed by: INTERNAL MEDICINE

## 2021-04-05 PROCEDURE — 11000001 HC ACUTE MED/SURG PRIVATE ROOM

## 2021-04-05 PROCEDURE — 97535 SELF CARE MNGMENT TRAINING: CPT

## 2021-04-05 PROCEDURE — 82962 GLUCOSE BLOOD TEST: CPT

## 2021-04-05 PROCEDURE — 36415 COLL VENOUS BLD VENIPUNCTURE: CPT

## 2021-04-05 PROCEDURE — 84132 ASSAY OF SERUM POTASSIUM: CPT

## 2021-04-05 PROCEDURE — A4217 STERILE WATER/SALINE, 500 ML: HCPCS | Performed by: FAMILY MEDICINE

## 2021-04-05 PROCEDURE — 82652 VIT D 1 25-DIHYDROXY: CPT

## 2021-04-05 PROCEDURE — 93005 ELECTROCARDIOGRAM TRACING: CPT

## 2021-04-05 PROCEDURE — 36600 WITHDRAWAL OF ARTERIAL BLOOD: CPT

## 2021-04-05 PROCEDURE — C9399 UNCLASSIFIED DRUGS OR BIOLOG: HCPCS | Performed by: FAMILY MEDICINE

## 2021-04-05 PROCEDURE — 25000003 PHARM REV CODE 250: Performed by: INTERNAL MEDICINE

## 2021-04-05 PROCEDURE — 82803 BLOOD GASES ANY COMBINATION: CPT

## 2021-04-05 PROCEDURE — 80048 BASIC METABOLIC PNL TOTAL CA: CPT

## 2021-04-05 RX ADMIN — PSYLLIUM HUSK 1 PACKET: 3.4 POWDER ORAL at 03:04

## 2021-04-05 RX ADMIN — INSULIN ASPART 2 UNITS: 100 INJECTION, SOLUTION INTRAVENOUS; SUBCUTANEOUS at 12:04

## 2021-04-05 RX ADMIN — PSYLLIUM HUSK 1 PACKET: 3.4 POWDER ORAL at 09:04

## 2021-04-05 RX ADMIN — SODIUM CHLORIDE, POTASSIUM CHLORIDE, SODIUM LACTATE AND CALCIUM CHLORIDE: 600; 310; 30; 20 INJECTION, SOLUTION INTRAVENOUS at 02:04

## 2021-04-05 RX ADMIN — ONDANSETRON 4 MG: 2 INJECTION INTRAMUSCULAR; INTRAVENOUS at 10:04

## 2021-04-05 RX ADMIN — INSULIN DETEMIR 5 UNITS: 100 INJECTION, SOLUTION SUBCUTANEOUS at 09:04

## 2021-04-05 RX ADMIN — CALCIUM GLUCONATE 1000 MG: 98 INJECTION, SOLUTION INTRAVENOUS at 09:04

## 2021-04-05 RX ADMIN — LOPERAMIDE HYDROCHLORIDE 2 MG: 2 CAPSULE ORAL at 10:04

## 2021-04-05 RX ADMIN — LOPERAMIDE HYDROCHLORIDE 2 MG: 2 CAPSULE ORAL at 09:04

## 2021-04-05 RX ADMIN — SODIUM POLYSTYRENE SULFONATE 15 G: 15 SUSPENSION ORAL; RECTAL at 10:04

## 2021-04-05 RX ADMIN — MORPHINE SULFATE 2 MG: 2 INJECTION, SOLUTION INTRAMUSCULAR; INTRAVENOUS at 10:04

## 2021-04-05 RX ADMIN — SODIUM BICARBONATE: 84 INJECTION, SOLUTION INTRAVENOUS at 05:04

## 2021-04-05 RX ADMIN — INSULIN ASPART 2 UNITS: 100 INJECTION, SOLUTION INTRAVENOUS; SUBCUTANEOUS at 05:04

## 2021-04-05 RX ADMIN — PANTOPRAZOLE SODIUM 40 MG: 40 INJECTION, POWDER, FOR SOLUTION INTRAVENOUS at 09:04

## 2021-04-06 VITALS
TEMPERATURE: 98 F | WEIGHT: 224 LBS | SYSTOLIC BLOOD PRESSURE: 119 MMHG | HEIGHT: 67 IN | DIASTOLIC BLOOD PRESSURE: 97 MMHG | HEART RATE: 106 BPM | BODY MASS INDEX: 35.16 KG/M2 | RESPIRATION RATE: 20 BRPM | OXYGEN SATURATION: 100 %

## 2021-04-06 PROBLEM — N17.9 ACUTE KIDNEY INJURY: Status: RESOLVED | Noted: 2021-03-26 | Resolved: 2021-04-06

## 2021-04-06 PROBLEM — E87.5 HYPERKALEMIA: Status: RESOLVED | Noted: 2021-04-05 | Resolved: 2021-04-06

## 2021-04-06 LAB
ANION GAP SERPL CALCULATED.3IONS-SCNC: 17 MMOL/L
BASOPHILS # BLD AUTO: 0.03 X10E3/UL (ref 0–0.2)
BASOPHILS NFR BLD AUTO: 0.4 % (ref 0–1)
BUN SERPL-MCNC: 38 MG/DL (ref 7–18)
CALCIUM SERPL-MCNC: 7.5 MG/DL (ref 8.5–10.1)
CHLORIDE SERPL-SCNC: 105 MMOL/L (ref 98–107)
CO2 SERPL-SCNC: 18 MMOL/L (ref 21–32)
CREAT SERPL-MCNC: 1.59 MG/DL (ref 0.55–1.02)
EOSINOPHIL # BLD AUTO: 0.11 X10E3/UL (ref 0–0.5)
EOSINOPHIL NFR BLD AUTO: 1.3 % (ref 1–4)
ERYTHROCYTE [DISTWIDTH] IN BLOOD BY AUTOMATED COUNT: 21.2 % (ref 11.5–14.5)
GLUCOSE SERPL-MCNC: 126 MG/DL (ref 70–105)
GLUCOSE SERPL-MCNC: 168 MG/DL (ref 70–105)
GLUCOSE SERPL-MCNC: 170 MG/DL (ref 74–106)
GLUCOSE SERPL-MCNC: 173 MG/DL (ref 70–105)
HCT VFR BLD AUTO: 27.9 % (ref 38–47)
HGB BLD-MCNC: 8.9 G/DL (ref 12–16)
IMM GRANULOCYTES # BLD AUTO: 0.13 X10E3/UL (ref 0–0.04)
IMM GRANULOCYTES NFR BLD: 1.6 % (ref 0–0.4)
LYMPHOCYTES # BLD AUTO: 1.92 X10E3/UL (ref 1–4.8)
LYMPHOCYTES NFR BLD AUTO: 23.3 % (ref 27–41)
MCH RBC QN AUTO: 26.1 PG (ref 27–31)
MCHC RBC AUTO-ENTMCNC: 31.9 G/DL (ref 32–36)
MCV RBC AUTO: 81.8 FL (ref 80–96)
MONOCYTES # BLD AUTO: 0.64 X10E3/UL (ref 0–0.8)
MONOCYTES NFR BLD AUTO: 7.8 % (ref 2–6)
MPC BLD CALC-MCNC: 9.2 FL (ref 9.4–12.4)
NEUTROPHILS # BLD AUTO: 5.42 X10E3/UL (ref 1.8–7.7)
NEUTROPHILS NFR BLD AUTO: 65.6 % (ref 53–65)
NRBC # BLD AUTO: 0 X10E3/UL (ref 0–0)
NRBC, AUTO (.00): 0 /100 (ref 0–0)
PLATELET # BLD AUTO: 354 X10E3/UL (ref 150–400)
POTASSIUM SERPL-SCNC: 5.1 MMOL/L (ref 3.5–5.1)
RBC # BLD AUTO: 3.41 X10E6/UL (ref 4.2–5.4)
SODIUM SERPL-SCNC: 135 MMOL/L (ref 136–145)
WBC # BLD AUTO: 8.25 X10E3/UL (ref 4.5–11)

## 2021-04-06 PROCEDURE — 25000003 PHARM REV CODE 250: Performed by: STUDENT IN AN ORGANIZED HEALTH CARE EDUCATION/TRAINING PROGRAM

## 2021-04-06 PROCEDURE — 36415 COLL VENOUS BLD VENIPUNCTURE: CPT

## 2021-04-06 PROCEDURE — C9113 INJ PANTOPRAZOLE SODIUM, VIA: HCPCS | Performed by: FAMILY MEDICINE

## 2021-04-06 PROCEDURE — 82962 GLUCOSE BLOOD TEST: CPT

## 2021-04-06 PROCEDURE — 97116 GAIT TRAINING THERAPY: CPT

## 2021-04-06 PROCEDURE — 85025 COMPLETE CBC W/AUTO DIFF WBC: CPT

## 2021-04-06 PROCEDURE — 25000242 PHARM REV CODE 250 ALT 637 W/ HCPCS: Performed by: INTERNAL MEDICINE

## 2021-04-06 PROCEDURE — 63600175 PHARM REV CODE 636 W HCPCS: Performed by: FAMILY MEDICINE

## 2021-04-06 PROCEDURE — 97110 THERAPEUTIC EXERCISES: CPT

## 2021-04-06 PROCEDURE — 25000003 PHARM REV CODE 250: Performed by: FAMILY MEDICINE

## 2021-04-06 PROCEDURE — 80048 BASIC METABOLIC PNL TOTAL CA: CPT

## 2021-04-06 RX ORDER — LOPERAMIDE HYDROCHLORIDE 2 MG/1
2 CAPSULE ORAL 2 TIMES DAILY
Qty: 60 CAPSULE | Refills: 0 | Status: ON HOLD | OUTPATIENT
Start: 2021-04-06 | End: 2021-04-24 | Stop reason: HOSPADM

## 2021-04-06 RX ORDER — PANTOPRAZOLE SODIUM 40 MG/1
40 TABLET, DELAYED RELEASE ORAL DAILY
Qty: 30 TABLET | Refills: 0 | Status: SHIPPED | OUTPATIENT
Start: 2021-04-06 | End: 2023-01-12

## 2021-04-06 RX ORDER — AMLODIPINE BESYLATE 5 MG/1
5 TABLET ORAL DAILY
Qty: 30 TABLET | Refills: 0 | Status: SHIPPED | OUTPATIENT
Start: 2021-04-06 | End: 2023-01-12

## 2021-04-06 RX ADMIN — SODIUM POLYSTYRENE SULFONATE 15 G: 15 SUSPENSION ORAL; RECTAL at 09:04

## 2021-04-06 RX ADMIN — PSYLLIUM HUSK 1 PACKET: 3.4 POWDER ORAL at 09:04

## 2021-04-06 RX ADMIN — MORPHINE SULFATE 2 MG: 2 INJECTION, SOLUTION INTRAMUSCULAR; INTRAVENOUS at 01:04

## 2021-04-06 RX ADMIN — INSULIN ASPART 2 UNITS: 100 INJECTION, SOLUTION INTRAVENOUS; SUBCUTANEOUS at 12:04

## 2021-04-06 RX ADMIN — INSULIN ASPART 1 UNITS: 100 INJECTION, SOLUTION INTRAVENOUS; SUBCUTANEOUS at 12:04

## 2021-04-06 RX ADMIN — LOPERAMIDE HYDROCHLORIDE 2 MG: 2 CAPSULE ORAL at 09:04

## 2021-04-06 RX ADMIN — PANTOPRAZOLE SODIUM 40 MG: 40 INJECTION, POWDER, FOR SOLUTION INTRAVENOUS at 09:04

## 2021-04-11 ENCOUNTER — HOSPITAL ENCOUNTER (INPATIENT)
Facility: HOSPITAL | Age: 36
LOS: 13 days | Discharge: HOME | DRG: 981 | End: 2021-04-24
Attending: INTERNAL MEDICINE | Admitting: INTERNAL MEDICINE
Payer: MEDICAID

## 2021-04-11 DIAGNOSIS — E11.9 TYPE 2 DIABETES MELLITUS, WITHOUT LONG-TERM CURRENT USE OF INSULIN: Primary | Chronic | ICD-10-CM

## 2021-04-11 DIAGNOSIS — R07.2 PRECORDIAL PAIN: ICD-10-CM

## 2021-04-11 DIAGNOSIS — E83.42 HYPOMAGNESEMIA: ICD-10-CM

## 2021-04-11 DIAGNOSIS — A41.50 SEPSIS DUE TO GRAM-NEGATIVE UTI: ICD-10-CM

## 2021-04-11 DIAGNOSIS — D50.9 MICROCYTIC ANEMIA: ICD-10-CM

## 2021-04-11 DIAGNOSIS — E11.10 DIABETIC KETOACIDOSIS WITHOUT COMA ASSOCIATED WITH TYPE 2 DIABETES MELLITUS: ICD-10-CM

## 2021-04-11 DIAGNOSIS — R07.9 CHEST PAIN: ICD-10-CM

## 2021-04-11 DIAGNOSIS — E87.5 HYPERKALEMIA: ICD-10-CM

## 2021-04-11 DIAGNOSIS — N17.9 ACUTE KIDNEY INJURY: ICD-10-CM

## 2021-04-11 DIAGNOSIS — N39.0 SEPSIS DUE TO GRAM-NEGATIVE UTI: ICD-10-CM

## 2021-04-11 DIAGNOSIS — E87.1 HYPONATREMIA: ICD-10-CM

## 2021-04-11 DIAGNOSIS — Z93.2 ILEOSTOMY STATUS: ICD-10-CM

## 2021-04-11 DIAGNOSIS — S31.000A SACRAL WOUND: ICD-10-CM

## 2021-04-11 DIAGNOSIS — R11.0 NAUSEA: ICD-10-CM

## 2021-04-11 DIAGNOSIS — K59.00 CONSTIPATION, UNSPECIFIED CONSTIPATION TYPE: ICD-10-CM

## 2021-04-11 DIAGNOSIS — R73.9 HYPERGLYCEMIA: ICD-10-CM

## 2021-04-11 LAB
ALBUMIN SERPL BCP-MCNC: 3.7 G/DL (ref 3.5–5)
ALBUMIN/GLOB SERPL: 0.6 {RATIO}
ALP SERPL-CCNC: 387 U/L (ref 37–98)
ALT SERPL W P-5'-P-CCNC: 64 U/L (ref 13–56)
ANION GAP SERPL CALCULATED.3IONS-SCNC: 23 MMOL/L
ANION GAP SERPL CALCULATED.3IONS-SCNC: 26 MMOL/L
AST SERPL W P-5'-P-CCNC: 46 U/L (ref 15–37)
BACTERIA #/AREA URNS HPF: ABNORMAL /HPF
BASOPHILS # BLD AUTO: 0.08 X10E3/UL (ref 0–0.2)
BASOPHILS NFR BLD AUTO: 0.5 % (ref 0–1)
BILIRUB SERPL-MCNC: 0.6 MG/DL (ref 0–1.2)
BILIRUB UR QL STRIP: ABNORMAL MG/DL
BUN SERPL-MCNC: 84 MG/DL (ref 7–18)
BUN SERPL-MCNC: 86 MG/DL (ref 7–18)
BUN/CREAT SERPL: 11.3
CALCIUM SERPL-MCNC: 7.8 MG/DL (ref 8.5–10.1)
CALCIUM SERPL-MCNC: 8.7 MG/DL (ref 8.5–10.1)
CHLORIDE SERPL-SCNC: 102 MMOL/L (ref 98–107)
CHLORIDE SERPL-SCNC: 96 MMOL/L (ref 98–107)
CLARITY UR: ABNORMAL
CO2 SERPL-SCNC: 13 MMOL/L (ref 21–32)
CO2 SERPL-SCNC: 13 MMOL/L (ref 21–32)
COLOR UR: YELLOW
CREAT SERPL-MCNC: 7.23 MG/DL (ref 0.55–1.02)
CREAT SERPL-MCNC: 7.6 MG/DL (ref 0.55–1.02)
EOSINOPHIL # BLD AUTO: 0.05 X10E3/UL (ref 0–0.5)
EOSINOPHIL NFR BLD AUTO: 0.3 % (ref 1–4)
ERYTHROCYTE [DISTWIDTH] IN BLOOD BY AUTOMATED COUNT: 20.1 % (ref 11.5–14.5)
FLUAV AG UPPER RESP QL IA.RAPID: NEGATIVE
FLUBV AG UPPER RESP QL IA.RAPID: NEGATIVE
GLOBULIN SER-MCNC: 6.3 G/DL (ref 2–4)
GLUCOSE SERPL-MCNC: 176 MG/DL (ref 74–106)
GLUCOSE SERPL-MCNC: 215 MG/DL (ref 74–106)
GLUCOSE SERPL-MCNC: 222 MG/DL (ref 70–105)
GLUCOSE SERPL-MCNC: 223 MG/DL (ref 70–105)
GLUCOSE UR STRIP-MCNC: NEGATIVE MG/DL
HCO3 UR-SCNC: 11.2 MMOL/L (ref 24–28)
HCT VFR BLD AUTO: 38.3 % (ref 38–47)
HGB BLD-MCNC: 12.4 G/DL (ref 12–16)
IMM GRANULOCYTES # BLD AUTO: 0.09 X10E3/UL (ref 0–0.04)
IMM GRANULOCYTES NFR BLD: 0.6 % (ref 0–0.4)
KETONES UR STRIP-SCNC: ABNORMAL MG/DL
LACTATE SERPL-SCNC: 2 MMOL/L (ref 0.4–2)
LEUKOCYTE ESTERASE UR QL STRIP: ABNORMAL LEU/UL
LYMPHOCYTES # BLD AUTO: 2.03 X10E3/UL (ref 1–4.8)
LYMPHOCYTES NFR BLD AUTO: 12.6 % (ref 27–41)
MCH RBC QN AUTO: 26.4 PG (ref 27–31)
MCHC RBC AUTO-ENTMCNC: 32.4 G/DL (ref 32–36)
MCV RBC AUTO: 81.5 FL (ref 80–96)
MONOCYTES # BLD AUTO: 1.09 X10E3/UL (ref 0–0.8)
MONOCYTES NFR BLD AUTO: 6.8 % (ref 2–6)
MPC BLD CALC-MCNC: 9.3 FL (ref 9.4–12.4)
MUCOUS THREADS #/AREA URNS HPF: ABNORMAL /HPF
NEUTROPHILS # BLD AUTO: 12.71 X10E3/UL (ref 1.8–7.7)
NEUTROPHILS NFR BLD AUTO: 79.2 % (ref 53–65)
NITRITE UR QL STRIP: NEGATIVE
NRBC # BLD AUTO: 0 X10E3/UL (ref 0–0)
NRBC, AUTO (.00): 0 /100 (ref 0–0)
NT-PROBNP SERPL-MCNC: 1137 PG/ML (ref 1–125)
PCO2 BLDA: 25 MMHG (ref 35–48)
PH SMN: 7.26 [PH] (ref 7.35–7.45)
PH UR STRIP: 5.5 PH UNITS (ref 5–8)
PLATELET # BLD AUTO: 503 X10E3/UL (ref 150–400)
PO2 BLDA: 146 MMHG (ref 83–108)
POC BASE EXCESS: -14.2 MMOL/L (ref -2–2)
POC SATURATED O2: 99 % (ref 40–70)
POTASSIUM SERPL-SCNC: 5.2 MMOL/L (ref 3.5–5.1)
POTASSIUM SERPL-SCNC: 6.6 MMOL/L (ref 3.5–5.1)
PROT SERPL-MCNC: 10 G/DL (ref 6.4–8.2)
PROT UR QL STRIP: >=300 MG/DL
RBC # BLD AUTO: 4.7 X10E6/UL (ref 4.2–5.4)
RBC # UR STRIP: ABNORMAL ERY/UL
RBC #/AREA URNS HPF: ABNORMAL /HPF (ref 0–3)
SARS-COV+SARS-COV-2 AG RESP QL IA.RAPID: NEGATIVE
SODIUM SERPL-SCNC: 128 MMOL/L (ref 136–145)
SODIUM SERPL-SCNC: 133 MMOL/L (ref 136–145)
SP GR UR STRIP: >=1.03 (ref 1–1.03)
SQUAMOUS #/AREA URNS LPF: ABNORMAL /LPF
TRANS CELLS #/AREA URNS LPF: ABNORMAL /LPF
TRICHOMONAS #/AREA URNS HPF: ABNORMAL /HPF
TROPONIN I SERPL-MCNC: <0.017 NG/ML (ref 0–0.06)
TROPONIN I SERPL-MCNC: <0.017 NG/ML (ref 0–0.06)
UROBILINOGEN UR STRIP-ACNC: 0.2 EU/DL
WBC # BLD AUTO: 16.05 X10E3/UL (ref 4.5–11)
WBC #/AREA URNS HPF: ABNORMAL /HPF (ref 0–5)
YEAST #/AREA URNS HPF: ABNORMAL /HPF

## 2021-04-11 PROCEDURE — 96365 THER/PROPH/DIAG IV INF INIT: CPT

## 2021-04-11 PROCEDURE — 84484 ASSAY OF TROPONIN QUANT: CPT

## 2021-04-11 PROCEDURE — 96375 TX/PRO/DX INJ NEW DRUG ADDON: CPT

## 2021-04-11 PROCEDURE — 82803 BLOOD GASES ANY COMBINATION: CPT

## 2021-04-11 PROCEDURE — 82570 ASSAY OF URINE CREATININE: CPT

## 2021-04-11 PROCEDURE — 83880 ASSAY OF NATRIURETIC PEPTIDE: CPT

## 2021-04-11 PROCEDURE — 93010 ELECTROCARDIOGRAM REPORT: CPT | Mod: ,,, | Performed by: HOSPITALIST

## 2021-04-11 PROCEDURE — 93005 ELECTROCARDIOGRAM TRACING: CPT

## 2021-04-11 PROCEDURE — 99285 EMERGENCY DEPT VISIT HI MDM: CPT | Mod: ,,, | Performed by: NURSE PRACTITIONER

## 2021-04-11 PROCEDURE — 80048 BASIC METABOLIC PNL TOTAL CA: CPT | Mod: XB

## 2021-04-11 PROCEDURE — 96361 HYDRATE IV INFUSION ADD-ON: CPT

## 2021-04-11 PROCEDURE — 87086 URINE CULTURE/COLONY COUNT: CPT

## 2021-04-11 PROCEDURE — 25000003 PHARM REV CODE 250: Performed by: INTERNAL MEDICINE

## 2021-04-11 PROCEDURE — 20000000 HC ICU ROOM

## 2021-04-11 PROCEDURE — 85025 COMPLETE CBC W/AUTO DIFF WBC: CPT

## 2021-04-11 PROCEDURE — 36415 COLL VENOUS BLD VENIPUNCTURE: CPT

## 2021-04-11 PROCEDURE — 87428 SARSCOV & INF VIR A&B AG IA: CPT

## 2021-04-11 PROCEDURE — 80053 COMPREHEN METABOLIC PANEL: CPT

## 2021-04-11 PROCEDURE — 93010 EKG 12-LEAD: ICD-10-PCS | Mod: ,,, | Performed by: HOSPITALIST

## 2021-04-11 PROCEDURE — 63600175 PHARM REV CODE 636 W HCPCS: Performed by: NURSE PRACTITIONER

## 2021-04-11 PROCEDURE — 63600175 PHARM REV CODE 636 W HCPCS: Performed by: INTERNAL MEDICINE

## 2021-04-11 PROCEDURE — 99285 EMERGENCY DEPT VISIT HI MDM: CPT | Mod: 25

## 2021-04-11 PROCEDURE — 84300 ASSAY OF URINE SODIUM: CPT

## 2021-04-11 PROCEDURE — G0378 HOSPITAL OBSERVATION PER HR: HCPCS

## 2021-04-11 PROCEDURE — 87040 BLOOD CULTURE FOR BACTERIA: CPT

## 2021-04-11 PROCEDURE — 83605 ASSAY OF LACTIC ACID: CPT

## 2021-04-11 PROCEDURE — 99285 PR EMERGENCY DEPT VISIT,LEVEL V: ICD-10-PCS | Mod: ,,, | Performed by: NURSE PRACTITIONER

## 2021-04-11 PROCEDURE — 25000003 PHARM REV CODE 250: Performed by: NURSE PRACTITIONER

## 2021-04-11 PROCEDURE — 82962 GLUCOSE BLOOD TEST: CPT

## 2021-04-11 RX ORDER — IBUPROFEN 200 MG
24 TABLET ORAL
Status: DISCONTINUED | OUTPATIENT
Start: 2021-04-11 | End: 2021-04-24 | Stop reason: HOSPADM

## 2021-04-11 RX ORDER — PANTOPRAZOLE SODIUM 40 MG/1
40 TABLET, DELAYED RELEASE ORAL DAILY
Status: DISCONTINUED | OUTPATIENT
Start: 2021-04-12 | End: 2021-04-21

## 2021-04-11 RX ORDER — IBUPROFEN 200 MG
16 TABLET ORAL
Status: DISCONTINUED | OUTPATIENT
Start: 2021-04-11 | End: 2021-04-11

## 2021-04-11 RX ORDER — SODIUM CHLORIDE AND POTASSIUM CHLORIDE 150; 900 MG/100ML; MG/100ML
INJECTION, SOLUTION INTRAVENOUS
Status: DISCONTINUED | OUTPATIENT
Start: 2021-04-11 | End: 2021-04-11

## 2021-04-11 RX ORDER — SODIUM CHLORIDE 9 MG/ML
INJECTION, SOLUTION INTRAVENOUS
Status: COMPLETED | OUTPATIENT
Start: 2021-04-11 | End: 2021-04-11

## 2021-04-11 RX ORDER — GLUCAGON 1 MG
1 KIT INJECTION
Status: DISCONTINUED | OUTPATIENT
Start: 2021-04-11 | End: 2021-04-11

## 2021-04-11 RX ORDER — INDOMETHACIN 25 MG/1
50 CAPSULE ORAL ONCE
Status: COMPLETED | OUTPATIENT
Start: 2021-04-11 | End: 2021-04-11

## 2021-04-11 RX ORDER — IBUPROFEN 200 MG
16 TABLET ORAL
Status: DISCONTINUED | OUTPATIENT
Start: 2021-04-11 | End: 2021-04-24 | Stop reason: HOSPADM

## 2021-04-11 RX ORDER — ONDANSETRON 2 MG/ML
4 INJECTION INTRAMUSCULAR; INTRAVENOUS
Status: COMPLETED | OUTPATIENT
Start: 2021-04-11 | End: 2021-04-11

## 2021-04-11 RX ORDER — HEPARIN SODIUM 5000 [USP'U]/ML
5000 INJECTION, SOLUTION INTRAVENOUS; SUBCUTANEOUS EVERY 12 HOURS
Status: DISCONTINUED | OUTPATIENT
Start: 2021-04-11 | End: 2021-04-15

## 2021-04-11 RX ORDER — SODIUM CHLORIDE 0.9 % (FLUSH) 0.9 %
10 SYRINGE (ML) INJECTION
Status: DISCONTINUED | OUTPATIENT
Start: 2021-04-11 | End: 2021-04-24 | Stop reason: HOSPADM

## 2021-04-11 RX ORDER — GLUCAGON 1 MG
1 KIT INJECTION
Status: DISCONTINUED | OUTPATIENT
Start: 2021-04-11 | End: 2021-04-24 | Stop reason: HOSPADM

## 2021-04-11 RX ORDER — HYDROCODONE BITARTRATE AND ACETAMINOPHEN 5; 325 MG/1; MG/1
1 TABLET ORAL EVERY 6 HOURS PRN
Status: DISCONTINUED | OUTPATIENT
Start: 2021-04-11 | End: 2021-04-24 | Stop reason: HOSPADM

## 2021-04-11 RX ORDER — ATORVASTATIN CALCIUM 20 MG/1
20 TABLET, FILM COATED ORAL NIGHTLY
Status: DISCONTINUED | OUTPATIENT
Start: 2021-04-11 | End: 2021-04-24 | Stop reason: HOSPADM

## 2021-04-11 RX ORDER — ZOLPIDEM TARTRATE 5 MG/1
5 TABLET ORAL NIGHTLY PRN
Status: DISCONTINUED | OUTPATIENT
Start: 2021-04-11 | End: 2021-04-24 | Stop reason: HOSPADM

## 2021-04-11 RX ORDER — GABAPENTIN 100 MG/1
200 CAPSULE ORAL 2 TIMES DAILY
Status: DISCONTINUED | OUTPATIENT
Start: 2021-04-11 | End: 2021-04-24 | Stop reason: HOSPADM

## 2021-04-11 RX ORDER — INSULIN ASPART 100 [IU]/ML
0-5 INJECTION, SOLUTION INTRAVENOUS; SUBCUTANEOUS
Status: DISCONTINUED | OUTPATIENT
Start: 2021-04-12 | End: 2021-04-12

## 2021-04-11 RX ORDER — ACETAMINOPHEN 325 MG/1
650 TABLET ORAL EVERY 8 HOURS PRN
Status: DISCONTINUED | OUTPATIENT
Start: 2021-04-11 | End: 2021-04-24 | Stop reason: HOSPADM

## 2021-04-11 RX ORDER — MORPHINE SULFATE 2 MG/ML
2 INJECTION, SOLUTION INTRAMUSCULAR; INTRAVENOUS EVERY 4 HOURS PRN
Status: DISCONTINUED | OUTPATIENT
Start: 2021-04-11 | End: 2021-04-24 | Stop reason: HOSPADM

## 2021-04-11 RX ORDER — SODIUM CHLORIDE 9 MG/ML
INJECTION, SOLUTION INTRAVENOUS CONTINUOUS
Status: DISCONTINUED | OUTPATIENT
Start: 2021-04-11 | End: 2021-04-11

## 2021-04-11 RX ORDER — INSULIN ASPART 100 [IU]/ML
0-5 INJECTION, SOLUTION INTRAVENOUS; SUBCUTANEOUS
Status: DISCONTINUED | OUTPATIENT
Start: 2021-04-11 | End: 2021-04-11

## 2021-04-11 RX ORDER — AMLODIPINE BESYLATE 5 MG/1
5 TABLET ORAL DAILY
Status: DISCONTINUED | OUTPATIENT
Start: 2021-04-12 | End: 2021-04-24 | Stop reason: HOSPADM

## 2021-04-11 RX ADMIN — SODIUM CHLORIDE 1000 ML: 0.9 INJECTION, SOLUTION INTRAVENOUS at 04:04

## 2021-04-11 RX ADMIN — ONDANSETRON 4 MG: 2 INJECTION INTRAMUSCULAR; INTRAVENOUS at 04:04

## 2021-04-11 RX ADMIN — HUMAN INSULIN 10 UNITS: 100 INJECTION, SOLUTION SUBCUTANEOUS at 10:04

## 2021-04-11 RX ADMIN — CEFTRIAXONE SODIUM 2 G: 2 INJECTION, POWDER, FOR SOLUTION INTRAMUSCULAR; INTRAVENOUS at 07:04

## 2021-04-11 RX ADMIN — SODIUM BICARBONATE 50 MEQ: 84 INJECTION, SOLUTION INTRAVENOUS at 10:04

## 2021-04-11 RX ADMIN — SODIUM BICARBONATE: 84 INJECTION, SOLUTION INTRAVENOUS at 10:04

## 2021-04-11 RX ADMIN — ATORVASTATIN CALCIUM 20 MG: 20 TABLET, FILM COATED ORAL at 10:04

## 2021-04-11 RX ADMIN — MORPHINE SULFATE 2 MG: 2 INJECTION, SOLUTION INTRAMUSCULAR; INTRAVENOUS at 10:04

## 2021-04-11 RX ADMIN — CALCIUM GLUCONATE 1000 MG: 98 INJECTION, SOLUTION INTRAVENOUS at 10:04

## 2021-04-11 RX ADMIN — GABAPENTIN 200 MG: 100 CAPSULE ORAL at 10:04

## 2021-04-11 RX ADMIN — HEPARIN SODIUM 5000 UNITS: 5000 INJECTION INTRAVENOUS; SUBCUTANEOUS at 10:04

## 2021-04-11 RX ADMIN — SODIUM CHLORIDE: 9 INJECTION, SOLUTION INTRAVENOUS at 07:04

## 2021-04-12 PROBLEM — A41.50 SEPSIS DUE TO GRAM-NEGATIVE UTI: Status: ACTIVE | Noted: 2021-04-12

## 2021-04-12 PROBLEM — E87.1 HYPONATREMIA: Status: ACTIVE | Noted: 2021-04-12

## 2021-04-12 PROBLEM — N39.0 SEPSIS DUE TO GRAM-NEGATIVE UTI: Status: ACTIVE | Noted: 2021-04-12

## 2021-04-12 PROBLEM — R07.2 PRECORDIAL PAIN: Status: ACTIVE | Noted: 2021-04-12

## 2021-04-12 PROBLEM — R11.0 NAUSEA: Status: ACTIVE | Noted: 2021-04-12

## 2021-04-12 LAB
ANION GAP SERPL CALCULATED.3IONS-SCNC: 21 MMOL/L
ANION GAP SERPL CALCULATED.3IONS-SCNC: 25 MMOL/L
BASOPHILS # BLD AUTO: 0.04 X10E3/UL (ref 0–0.2)
BASOPHILS NFR BLD AUTO: 0.4 % (ref 0–1)
BUN SERPL-MCNC: 82 MG/DL (ref 7–18)
BUN SERPL-MCNC: 84 MG/DL (ref 7–18)
CALCIUM SERPL-MCNC: 7.7 MG/DL (ref 8.5–10.1)
CALCIUM SERPL-MCNC: 8.4 MG/DL (ref 8.5–10.1)
CHLORIDE SERPL-SCNC: 100 MMOL/L (ref 98–107)
CHLORIDE SERPL-SCNC: 101 MMOL/L (ref 98–107)
CHOLEST SERPL-MCNC: 246 MG/DL
CO2 SERPL-SCNC: 14 MMOL/L (ref 21–32)
CO2 SERPL-SCNC: 18 MMOL/L (ref 21–32)
CREAT SERPL-MCNC: 6.24 MG/DL (ref 0.55–1.02)
CREAT SERPL-MCNC: 6.81 MG/DL (ref 0.55–1.02)
CREAT UR-MCNC: 282 MG/DL (ref 28–217)
EOSINOPHIL # BLD AUTO: 0.03 X10E3/UL (ref 0–0.5)
EOSINOPHIL NFR BLD AUTO: 0.3 % (ref 1–4)
ERYTHROCYTE [DISTWIDTH] IN BLOOD BY AUTOMATED COUNT: 20.5 % (ref 11.5–14.5)
GLUCOSE SERPL-MCNC: 119 MG/DL (ref 70–105)
GLUCOSE SERPL-MCNC: 131 MG/DL (ref 70–105)
GLUCOSE SERPL-MCNC: 135 MG/DL (ref 70–105)
GLUCOSE SERPL-MCNC: 139 MG/DL (ref 74–106)
GLUCOSE SERPL-MCNC: 184 MG/DL (ref 74–106)
HCT VFR BLD AUTO: 33.6 % (ref 38–47)
HDLC SERPL-MCNC: 34 MG/DL
HGB BLD-MCNC: 10.9 G/DL (ref 12–16)
IMM GRANULOCYTES # BLD AUTO: 0.06 X10E3/UL (ref 0–0.04)
IMM GRANULOCYTES NFR BLD: 0.6 % (ref 0–0.4)
LDLC SERPL CALC-MCNC: NORMAL MG/DL
LIPASE SERPL-CCNC: 295 U/L (ref 73–393)
LYMPHOCYTES # BLD AUTO: 1.6 X10E3/UL (ref 1–4.8)
LYMPHOCYTES NFR BLD AUTO: 15.5 % (ref 27–41)
MCH RBC QN AUTO: 25.9 PG (ref 27–31)
MCHC RBC AUTO-ENTMCNC: 32.4 G/DL (ref 32–36)
MCV RBC AUTO: 79.8 FL (ref 80–96)
MONOCYTES # BLD AUTO: 0.71 X10E3/UL (ref 0–0.8)
MONOCYTES NFR BLD AUTO: 6.9 % (ref 2–6)
MPC BLD CALC-MCNC: 9.6 FL (ref 9.4–12.4)
NEUTROPHILS # BLD AUTO: 7.86 X10E3/UL (ref 1.8–7.7)
NEUTROPHILS NFR BLD AUTO: 76.3 % (ref 53–65)
NONHDLC SERPL-MCNC: 212 MG/DL
NRBC # BLD AUTO: 0 X10E3/UL (ref 0–0)
NRBC, AUTO (.00): 0 /100 (ref 0–0)
PHOSPHATE SERPL-MCNC: 5 MG/DL (ref 2.5–4.5)
PLATELET # BLD AUTO: 437 X10E3/UL (ref 150–400)
POTASSIUM SERPL-SCNC: 5.1 MMOL/L (ref 3.5–5.1)
POTASSIUM SERPL-SCNC: 5.7 MMOL/L (ref 3.5–5.1)
RBC # BLD AUTO: 4.21 X10E6/UL (ref 4.2–5.4)
SODIUM SERPL-SCNC: 133 MMOL/L (ref 136–145)
SODIUM SERPL-SCNC: 135 MMOL/L (ref 136–145)
SODIUM SERPL-SCNC: 46 MMOL/L/24HR (ref 40–220)
TRIGL SERPL-MCNC: 448 MG/DL
TROPONIN I SERPL-MCNC: <0.017 NG/ML (ref 0–0.06)
TROPONIN I SERPL-MCNC: <0.017 NG/ML (ref 0–0.06)
VLDLC SERPL-MCNC: NORMAL MG/DL
WBC # BLD AUTO: 10.3 X10E3/UL (ref 4.5–11)

## 2021-04-12 PROCEDURE — 99233 SBSQ HOSP IP/OBS HIGH 50: CPT | Mod: ,,, | Performed by: INTERNAL MEDICINE

## 2021-04-12 PROCEDURE — 84484 ASSAY OF TROPONIN QUANT: CPT

## 2021-04-12 PROCEDURE — 63600175 PHARM REV CODE 636 W HCPCS: Performed by: INTERNAL MEDICINE

## 2021-04-12 PROCEDURE — 99223 PR INITIAL HOSPITAL CARE,LEVL III: ICD-10-PCS | Mod: ,,, | Performed by: INTERNAL MEDICINE

## 2021-04-12 PROCEDURE — 25000003 PHARM REV CODE 250: Performed by: INTERNAL MEDICINE

## 2021-04-12 PROCEDURE — 80061 LIPID PANEL: CPT

## 2021-04-12 PROCEDURE — 80202 ASSAY OF VANCOMYCIN: CPT

## 2021-04-12 PROCEDURE — 80048 BASIC METABOLIC PNL TOTAL CA: CPT

## 2021-04-12 PROCEDURE — 94761 N-INVAS EAR/PLS OXIMETRY MLT: CPT

## 2021-04-12 PROCEDURE — 99223 1ST HOSP IP/OBS HIGH 75: CPT | Mod: ,,, | Performed by: INTERNAL MEDICINE

## 2021-04-12 PROCEDURE — 99233 PR SUBSEQUENT HOSPITAL CARE,LEVL III: ICD-10-PCS | Mod: ,,, | Performed by: INTERNAL MEDICINE

## 2021-04-12 PROCEDURE — 84100 ASSAY OF PHOSPHORUS: CPT

## 2021-04-12 PROCEDURE — 85025 COMPLETE CBC W/AUTO DIFF WBC: CPT

## 2021-04-12 PROCEDURE — 36415 COLL VENOUS BLD VENIPUNCTURE: CPT

## 2021-04-12 PROCEDURE — 20000000 HC ICU ROOM

## 2021-04-12 PROCEDURE — 82962 GLUCOSE BLOOD TEST: CPT

## 2021-04-12 RX ORDER — SODIUM CHLORIDE 0.9 % (FLUSH) 0.9 %
10 SYRINGE (ML) INJECTION
Status: CANCELLED | OUTPATIENT
Start: 2021-04-12

## 2021-04-12 RX ORDER — IBUPROFEN 200 MG
16 TABLET ORAL
Status: CANCELLED | OUTPATIENT
Start: 2021-04-12

## 2021-04-12 RX ORDER — GLUCAGON 1 MG
1 KIT INJECTION
Status: CANCELLED | OUTPATIENT
Start: 2021-04-12

## 2021-04-12 RX ORDER — INSULIN ASPART 100 [IU]/ML
0-5 INJECTION, SOLUTION INTRAVENOUS; SUBCUTANEOUS
Status: DISCONTINUED | OUTPATIENT
Start: 2021-04-12 | End: 2021-04-20

## 2021-04-12 RX ORDER — IBUPROFEN 200 MG
24 TABLET ORAL
Status: CANCELLED | OUTPATIENT
Start: 2021-04-12

## 2021-04-12 RX ADMIN — SODIUM BICARBONATE: 84 INJECTION, SOLUTION INTRAVENOUS at 07:04

## 2021-04-12 RX ADMIN — HEPARIN SODIUM 5000 UNITS: 5000 INJECTION INTRAVENOUS; SUBCUTANEOUS at 08:04

## 2021-04-12 RX ADMIN — ATORVASTATIN CALCIUM 20 MG: 20 TABLET, FILM COATED ORAL at 08:04

## 2021-04-12 RX ADMIN — AMLODIPINE BESYLATE 5 MG: 5 TABLET ORAL at 09:04

## 2021-04-12 RX ADMIN — GABAPENTIN 200 MG: 100 CAPSULE ORAL at 08:04

## 2021-04-12 RX ADMIN — HEPARIN SODIUM 5000 UNITS: 5000 INJECTION INTRAVENOUS; SUBCUTANEOUS at 09:04

## 2021-04-12 RX ADMIN — CEFTRIAXONE SODIUM 1 G: 1 INJECTION, POWDER, FOR SOLUTION INTRAMUSCULAR; INTRAVENOUS at 06:04

## 2021-04-12 RX ADMIN — MORPHINE SULFATE 2 MG: 2 INJECTION, SOLUTION INTRAMUSCULAR; INTRAVENOUS at 04:04

## 2021-04-12 RX ADMIN — PANTOPRAZOLE SODIUM 40 MG: 40 TABLET, DELAYED RELEASE ORAL at 09:04

## 2021-04-12 RX ADMIN — GABAPENTIN 200 MG: 100 CAPSULE ORAL at 09:04

## 2021-04-12 RX ADMIN — SODIUM BICARBONATE: 84 INJECTION, SOLUTION INTRAVENOUS at 12:04

## 2021-04-13 LAB
ALBUMIN SERPL BCP-MCNC: 2.8 G/DL (ref 3.5–5)
ALBUMIN/GLOB SERPL: 0.6 {RATIO}
ALP SERPL-CCNC: 276 U/L (ref 37–98)
ALT SERPL W P-5'-P-CCNC: 44 U/L (ref 13–56)
ANION GAP SERPL CALCULATED.3IONS-SCNC: 19 MMOL/L
AST SERPL W P-5'-P-CCNC: 30 U/L (ref 15–37)
BASOPHILS # BLD AUTO: 0.05 X10E3/UL (ref 0–0.2)
BASOPHILS NFR BLD AUTO: 0.6 % (ref 0–1)
BILIRUB SERPL-MCNC: 0.4 MG/DL (ref 0–1.2)
BUN SERPL-MCNC: 75 MG/DL (ref 7–18)
BUN/CREAT SERPL: 15.2
CALCIUM SERPL-MCNC: 7.5 MG/DL (ref 8.5–10.1)
CHLORIDE SERPL-SCNC: 98 MMOL/L (ref 98–107)
CO2 SERPL-SCNC: 21 MMOL/L (ref 21–32)
CREAT SERPL-MCNC: 4.95 MG/DL (ref 0.55–1.02)
EOSINOPHIL # BLD AUTO: 0.09 X10E3/UL (ref 0–0.5)
EOSINOPHIL NFR BLD AUTO: 1.1 % (ref 1–4)
ERYTHROCYTE [DISTWIDTH] IN BLOOD BY AUTOMATED COUNT: 19.7 % (ref 11.5–14.5)
GLOBULIN SER-MCNC: 4.8 G/DL (ref 2–4)
GLUCOSE SERPL-MCNC: 104 MG/DL (ref 74–106)
GLUCOSE SERPL-MCNC: 114 MG/DL (ref 70–105)
GLUCOSE SERPL-MCNC: 167 MG/DL (ref 70–105)
HCT VFR BLD AUTO: 30.3 % (ref 38–47)
HGB BLD-MCNC: 9.9 G/DL (ref 12–16)
IMM GRANULOCYTES # BLD AUTO: 0.06 X10E3/UL (ref 0–0.04)
IMM GRANULOCYTES NFR BLD: 0.8 % (ref 0–0.4)
LYMPHOCYTES # BLD AUTO: 2.63 X10E3/UL (ref 1–4.8)
LYMPHOCYTES NFR BLD AUTO: 33.2 % (ref 27–41)
MAGNESIUM SERPL-MCNC: 0.8 MG/DL (ref 1.7–2.3)
MCH RBC QN AUTO: 26.5 PG (ref 27–31)
MCHC RBC AUTO-ENTMCNC: 32.7 G/DL (ref 32–36)
MCV RBC AUTO: 81.2 FL (ref 80–96)
MONOCYTES # BLD AUTO: 0.62 X10E3/UL (ref 0–0.8)
MONOCYTES NFR BLD AUTO: 7.8 % (ref 2–6)
MPC BLD CALC-MCNC: 9.8 FL (ref 9.4–12.4)
NEUTROPHILS # BLD AUTO: 4.48 X10E3/UL (ref 1.8–7.7)
NEUTROPHILS NFR BLD AUTO: 56.5 % (ref 53–65)
NRBC # BLD AUTO: 0 X10E3/UL (ref 0–0)
NRBC, AUTO (.00): 0 /100 (ref 0–0)
PLATELET # BLD AUTO: 369 X10E3/UL (ref 150–400)
POTASSIUM SERPL-SCNC: 4.4 MMOL/L (ref 3.5–5.1)
PROT SERPL-MCNC: 7.6 G/DL (ref 6.4–8.2)
RBC # BLD AUTO: 3.73 X10E6/UL (ref 4.2–5.4)
SODIUM SERPL-SCNC: 134 MMOL/L (ref 136–145)
WBC # BLD AUTO: 7.93 X10E3/UL (ref 4.5–11)

## 2021-04-13 PROCEDURE — 94761 N-INVAS EAR/PLS OXIMETRY MLT: CPT

## 2021-04-13 PROCEDURE — 99233 PR SUBSEQUENT HOSPITAL CARE,LEVL III: ICD-10-PCS | Mod: ,,, | Performed by: INTERNAL MEDICINE

## 2021-04-13 PROCEDURE — 25000003 PHARM REV CODE 250: Performed by: STUDENT IN AN ORGANIZED HEALTH CARE EDUCATION/TRAINING PROGRAM

## 2021-04-13 PROCEDURE — 83735 ASSAY OF MAGNESIUM: CPT

## 2021-04-13 PROCEDURE — 63600175 PHARM REV CODE 636 W HCPCS: Performed by: INTERNAL MEDICINE

## 2021-04-13 PROCEDURE — 82962 GLUCOSE BLOOD TEST: CPT

## 2021-04-13 PROCEDURE — 25000003 PHARM REV CODE 250: Performed by: INTERNAL MEDICINE

## 2021-04-13 PROCEDURE — 36415 COLL VENOUS BLD VENIPUNCTURE: CPT

## 2021-04-13 PROCEDURE — 99233 SBSQ HOSP IP/OBS HIGH 50: CPT | Mod: ,,, | Performed by: INTERNAL MEDICINE

## 2021-04-13 PROCEDURE — 80053 COMPREHEN METABOLIC PANEL: CPT

## 2021-04-13 PROCEDURE — 85025 COMPLETE CBC W/AUTO DIFF WBC: CPT

## 2021-04-13 PROCEDURE — 63600175 PHARM REV CODE 636 W HCPCS: Performed by: NURSE PRACTITIONER

## 2021-04-13 PROCEDURE — 20000000 HC ICU ROOM

## 2021-04-13 PROCEDURE — 27000654 HC CATH IV JELCO

## 2021-04-13 PROCEDURE — 27200120 HC KIT IV START (RUSH ONLY)

## 2021-04-13 RX ORDER — LOPERAMIDE HYDROCHLORIDE 2 MG/1
2 CAPSULE ORAL 3 TIMES DAILY
Status: DISCONTINUED | OUTPATIENT
Start: 2021-04-13 | End: 2021-04-15

## 2021-04-13 RX ORDER — SODIUM CHLORIDE 450 MG/100ML
INJECTION, SOLUTION INTRAVENOUS CONTINUOUS
Status: DISCONTINUED | OUTPATIENT
Start: 2021-04-13 | End: 2021-04-16

## 2021-04-13 RX ORDER — MAGNESIUM SULFATE HEPTAHYDRATE 40 MG/ML
2 INJECTION, SOLUTION INTRAVENOUS ONCE
Status: COMPLETED | OUTPATIENT
Start: 2021-04-13 | End: 2021-04-13

## 2021-04-13 RX ADMIN — LOPERAMIDE HYDROCHLORIDE 2 MG: 2 CAPSULE ORAL at 08:04

## 2021-04-13 RX ADMIN — GABAPENTIN 200 MG: 100 CAPSULE ORAL at 08:04

## 2021-04-13 RX ADMIN — CEFTRIAXONE SODIUM 1 G: 1 INJECTION, POWDER, FOR SOLUTION INTRAMUSCULAR; INTRAVENOUS at 07:04

## 2021-04-13 RX ADMIN — AMLODIPINE BESYLATE 5 MG: 5 TABLET ORAL at 09:04

## 2021-04-13 RX ADMIN — HEPARIN SODIUM 5000 UNITS: 5000 INJECTION INTRAVENOUS; SUBCUTANEOUS at 08:04

## 2021-04-13 RX ADMIN — GABAPENTIN 200 MG: 100 CAPSULE ORAL at 09:04

## 2021-04-13 RX ADMIN — HEPARIN SODIUM 5000 UNITS: 5000 INJECTION INTRAVENOUS; SUBCUTANEOUS at 09:04

## 2021-04-13 RX ADMIN — MORPHINE SULFATE 2 MG: 2 INJECTION, SOLUTION INTRAMUSCULAR; INTRAVENOUS at 11:04

## 2021-04-13 RX ADMIN — SODIUM BICARBONATE: 84 INJECTION, SOLUTION INTRAVENOUS at 03:04

## 2021-04-13 RX ADMIN — PSYLLIUM HUSK 1 PACKET: 3.4 POWDER ORAL at 08:04

## 2021-04-13 RX ADMIN — PANTOPRAZOLE SODIUM 40 MG: 40 TABLET, DELAYED RELEASE ORAL at 09:04

## 2021-04-13 RX ADMIN — ATORVASTATIN CALCIUM 20 MG: 20 TABLET, FILM COATED ORAL at 08:04

## 2021-04-13 RX ADMIN — SODIUM CHLORIDE: 4.5 INJECTION, SOLUTION INTRAVENOUS at 12:04

## 2021-04-13 RX ADMIN — MAGNESIUM SULFATE IN WATER 2 G: 40 INJECTION, SOLUTION INTRAVENOUS at 01:04

## 2021-04-13 RX ADMIN — HYDROCODONE BITARTRATE AND ACETAMINOPHEN 1 TABLET: 5; 325 TABLET ORAL at 03:04

## 2021-04-13 RX ADMIN — LOPERAMIDE HYDROCHLORIDE 2 MG: 2 CAPSULE ORAL at 05:04

## 2021-04-14 LAB
ALBUMIN SERPL BCP-MCNC: 2.5 G/DL (ref 3.5–5)
ALBUMIN/GLOB SERPL: 0.5 {RATIO}
ALP SERPL-CCNC: 219 U/L (ref 37–98)
ALT SERPL W P-5'-P-CCNC: 34 U/L (ref 13–56)
ANION GAP SERPL CALCULATED.3IONS-SCNC: 16 MMOL/L
AST SERPL W P-5'-P-CCNC: 17 U/L (ref 15–37)
BASOPHILS # BLD AUTO: 0.03 X10E3/UL (ref 0–0.2)
BASOPHILS NFR BLD AUTO: 0.4 % (ref 0–1)
BILIRUB SERPL-MCNC: 0.2 MG/DL (ref 0–1.2)
BUN SERPL-MCNC: 67 MG/DL (ref 7–18)
BUN/CREAT SERPL: 18
CALCIUM SERPL-MCNC: 7.1 MG/DL (ref 8.5–10.1)
CHLORIDE SERPL-SCNC: 98 MMOL/L (ref 98–107)
CO2 SERPL-SCNC: 22 MMOL/L (ref 21–32)
CREAT SERPL-MCNC: 3.72 MG/DL (ref 0.55–1.02)
EOSINOPHIL # BLD AUTO: 0.1 X10E3/UL (ref 0–0.5)
EOSINOPHIL NFR BLD AUTO: 1.4 % (ref 1–4)
ERYTHROCYTE [DISTWIDTH] IN BLOOD BY AUTOMATED COUNT: 18.8 % (ref 11.5–14.5)
GLOBULIN SER-MCNC: 4.6 G/DL (ref 2–4)
GLUCOSE SERPL-MCNC: 151 MG/DL (ref 70–105)
GLUCOSE SERPL-MCNC: 157 MG/DL (ref 74–106)
GLUCOSE SERPL-MCNC: 161 MG/DL (ref 70–105)
GLUCOSE SERPL-MCNC: 205 MG/DL (ref 70–105)
GLUCOSE SERPL-MCNC: 208 MG/DL (ref 70–105)
HCT VFR BLD AUTO: 26.4 % (ref 38–47)
HGB BLD-MCNC: 8.7 G/DL (ref 12–16)
IMM GRANULOCYTES # BLD AUTO: 0.04 X10E3/UL (ref 0–0.04)
IMM GRANULOCYTES NFR BLD: 0.5 % (ref 0–0.4)
LYMPHOCYTES # BLD AUTO: 2.18 X10E3/UL (ref 1–4.8)
LYMPHOCYTES NFR BLD AUTO: 29.9 % (ref 27–41)
MAGNESIUM SERPL-MCNC: 1 MG/DL (ref 1.7–2.3)
MCH RBC QN AUTO: 26.6 PG (ref 27–31)
MCHC RBC AUTO-ENTMCNC: 33 G/DL (ref 32–36)
MCV RBC AUTO: 80.7 FL (ref 80–96)
MONOCYTES # BLD AUTO: 0.46 X10E3/UL (ref 0–0.8)
MONOCYTES NFR BLD AUTO: 6.3 % (ref 2–6)
MPC BLD CALC-MCNC: 9.8 FL (ref 9.4–12.4)
NEUTROPHILS # BLD AUTO: 4.47 X10E3/UL (ref 1.8–7.7)
NEUTROPHILS NFR BLD AUTO: 61.5 % (ref 53–65)
NRBC # BLD AUTO: 0 X10E3/UL (ref 0–0)
NRBC, AUTO (.00): 0 /100 (ref 0–0)
PLATELET # BLD AUTO: 352 X10E3/UL (ref 150–400)
POTASSIUM SERPL-SCNC: 4 MMOL/L (ref 3.5–5.1)
PROT SERPL-MCNC: 7.1 G/DL (ref 6.4–8.2)
RBC # BLD AUTO: 3.27 X10E6/UL (ref 4.2–5.4)
REPORT: NORMAL
SODIUM SERPL-SCNC: 132 MMOL/L (ref 136–145)
WBC # BLD AUTO: 7.28 X10E3/UL (ref 4.5–11)

## 2021-04-14 PROCEDURE — 25000003 PHARM REV CODE 250: Performed by: STUDENT IN AN ORGANIZED HEALTH CARE EDUCATION/TRAINING PROGRAM

## 2021-04-14 PROCEDURE — 36415 COLL VENOUS BLD VENIPUNCTURE: CPT

## 2021-04-14 PROCEDURE — 80053 COMPREHEN METABOLIC PANEL: CPT

## 2021-04-14 PROCEDURE — 63600175 PHARM REV CODE 636 W HCPCS: Performed by: INTERNAL MEDICINE

## 2021-04-14 PROCEDURE — 94761 N-INVAS EAR/PLS OXIMETRY MLT: CPT

## 2021-04-14 PROCEDURE — 25000003 PHARM REV CODE 250: Performed by: INTERNAL MEDICINE

## 2021-04-14 PROCEDURE — 20000000 HC ICU ROOM

## 2021-04-14 PROCEDURE — 99232 SBSQ HOSP IP/OBS MODERATE 35: CPT | Mod: ,,, | Performed by: INTERNAL MEDICINE

## 2021-04-14 PROCEDURE — 63600175 PHARM REV CODE 636 W HCPCS: Performed by: HOSPITALIST

## 2021-04-14 PROCEDURE — 82962 GLUCOSE BLOOD TEST: CPT

## 2021-04-14 PROCEDURE — 27100005 HC ECG ELECTRODES

## 2021-04-14 PROCEDURE — 83735 ASSAY OF MAGNESIUM: CPT

## 2021-04-14 PROCEDURE — 85025 COMPLETE CBC W/AUTO DIFF WBC: CPT

## 2021-04-14 PROCEDURE — 99232 PR SUBSEQUENT HOSPITAL CARE,LEVL II: ICD-10-PCS | Mod: ,,, | Performed by: INTERNAL MEDICINE

## 2021-04-14 PROCEDURE — 97165 OT EVAL LOW COMPLEX 30 MIN: CPT

## 2021-04-14 RX ORDER — MAGNESIUM SULFATE 1 G/100ML
1 INJECTION INTRAVENOUS ONCE
Status: COMPLETED | OUTPATIENT
Start: 2021-04-14 | End: 2021-04-14

## 2021-04-14 RX ADMIN — HEPARIN SODIUM 5000 UNITS: 5000 INJECTION INTRAVENOUS; SUBCUTANEOUS at 08:04

## 2021-04-14 RX ADMIN — SODIUM CHLORIDE: 4.5 INJECTION, SOLUTION INTRAVENOUS at 01:04

## 2021-04-14 RX ADMIN — PSYLLIUM HUSK 1 PACKET: 3.4 POWDER ORAL at 08:04

## 2021-04-14 RX ADMIN — SODIUM CHLORIDE: 4.5 INJECTION, SOLUTION INTRAVENOUS at 11:04

## 2021-04-14 RX ADMIN — LOPERAMIDE HYDROCHLORIDE 2 MG: 2 CAPSULE ORAL at 08:04

## 2021-04-14 RX ADMIN — PSYLLIUM HUSK 1 PACKET: 3.4 POWDER ORAL at 09:04

## 2021-04-14 RX ADMIN — MAGNESIUM SULFATE IN DEXTROSE 1 G: 10 INJECTION, SOLUTION INTRAVENOUS at 09:04

## 2021-04-14 RX ADMIN — AMLODIPINE BESYLATE 5 MG: 5 TABLET ORAL at 08:04

## 2021-04-14 RX ADMIN — GABAPENTIN 200 MG: 100 CAPSULE ORAL at 08:04

## 2021-04-14 RX ADMIN — LOPERAMIDE HYDROCHLORIDE 2 MG: 2 CAPSULE ORAL at 04:04

## 2021-04-14 RX ADMIN — HYDROCODONE BITARTRATE AND ACETAMINOPHEN 1 TABLET: 5; 325 TABLET ORAL at 08:04

## 2021-04-14 RX ADMIN — CEFTRIAXONE SODIUM 1 G: 1 INJECTION, POWDER, FOR SOLUTION INTRAMUSCULAR; INTRAVENOUS at 05:04

## 2021-04-14 RX ADMIN — HYDROCODONE BITARTRATE AND ACETAMINOPHEN 1 TABLET: 5; 325 TABLET ORAL at 10:04

## 2021-04-14 RX ADMIN — INSULIN ASPART 1 UNITS: 100 INJECTION, SOLUTION INTRAVENOUS; SUBCUTANEOUS at 09:04

## 2021-04-14 RX ADMIN — PANTOPRAZOLE SODIUM 40 MG: 40 TABLET, DELAYED RELEASE ORAL at 08:04

## 2021-04-14 RX ADMIN — ATORVASTATIN CALCIUM 20 MG: 20 TABLET, FILM COATED ORAL at 08:04

## 2021-04-14 RX ADMIN — INSULIN ASPART 2 UNITS: 100 INJECTION, SOLUTION INTRAVENOUS; SUBCUTANEOUS at 06:04

## 2021-04-15 PROBLEM — E87.5 HYPERKALEMIA: Status: RESOLVED | Noted: 2021-04-11 | Resolved: 2021-04-15

## 2021-04-15 LAB
ANION GAP SERPL CALCULATED.3IONS-SCNC: 14.5 MMOL/L (ref 7–16)
ANION GAP SERPL CALCULATED.3IONS-SCNC: 20 MMOL/L
BASOPHILS # BLD AUTO: 0.04 X10E3/UL (ref 0–0.2)
BASOPHILS NFR BLD AUTO: 0.6 % (ref 0–1)
BUN SERPL-MCNC: 54 MG/DL (ref 7–18)
BUN SERPL-MCNC: 59 MG/DL (ref 7–18)
CALCIUM SERPL-MCNC: 7.8 MG/DL (ref 8.5–10.1)
CALCIUM SERPL-MCNC: 7.8 MG/DL (ref 8.5–10.1)
CHLORIDE SERPL-SCNC: 101 MMOL/L (ref 98–107)
CHLORIDE SERPL-SCNC: 102 MMOL/L (ref 98–107)
CO2 SERPL-SCNC: 17 MMOL/L (ref 21–32)
CO2 SERPL-SCNC: 19 MMOL/L (ref 21–32)
CREAT SERPL-MCNC: 2.89 MG/DL (ref 0.5–1.02)
CREAT SERPL-MCNC: 2.9 MG/DL (ref 0.55–1.02)
EOSINOPHIL # BLD AUTO: 0.09 X10E3/UL (ref 0–0.5)
EOSINOPHIL NFR BLD AUTO: 1.3 % (ref 1–4)
ERYTHROCYTE [DISTWIDTH] IN BLOOD BY AUTOMATED COUNT: 18.4 % (ref 11.5–14.5)
GLUCOSE SERPL-MCNC: 161 MG/DL (ref 70–105)
GLUCOSE SERPL-MCNC: 176 MG/DL (ref 70–105)
GLUCOSE SERPL-MCNC: 190 MG/DL (ref 74–106)
GLUCOSE SERPL-MCNC: 201 MG/DL (ref 70–105)
GLUCOSE SERPL-MCNC: 217 MG/DL (ref 74–106)
GLUCOSE SERPL-MCNC: 282 MG/DL (ref 70–105)
HCT VFR BLD AUTO: 29.2 % (ref 38–47)
HGB BLD-MCNC: 9.2 G/DL (ref 12–16)
IMM GRANULOCYTES # BLD AUTO: 0.05 X10E3/UL (ref 0–0.04)
IMM GRANULOCYTES NFR BLD: 0.7 % (ref 0–0.4)
LYMPHOCYTES # BLD AUTO: 2.16 X10E3/UL (ref 1–4.8)
LYMPHOCYTES NFR BLD AUTO: 30 % (ref 27–41)
MAGNESIUM SERPL-MCNC: 1.2 MG/DL (ref 1.7–2.3)
MCH RBC QN AUTO: 26.3 PG (ref 27–31)
MCHC RBC AUTO-ENTMCNC: 31.5 G/DL (ref 32–36)
MCV RBC AUTO: 83.4 FL (ref 80–96)
MONOCYTES # BLD AUTO: 0.38 X10E3/UL (ref 0–0.8)
MONOCYTES NFR BLD AUTO: 5.3 % (ref 2–6)
MPC BLD CALC-MCNC: 9.7 FL (ref 9.4–12.4)
NEUTROPHILS # BLD AUTO: 4.47 X10E3/UL (ref 1.8–7.7)
NEUTROPHILS NFR BLD AUTO: 62.1 % (ref 53–65)
NRBC # BLD AUTO: 0 X10E3/UL (ref 0–0)
NRBC, AUTO (.00): 0 /100 (ref 0–0)
PLATELET # BLD AUTO: 365 X10E3/UL (ref 150–400)
POTASSIUM SERPL-SCNC: 4.5 MMOL/L (ref 3.5–5.1)
POTASSIUM SERPL-SCNC: 4.9 MMOL/L (ref 3.5–5.1)
RBC # BLD AUTO: 3.5 X10E6/UL (ref 4.2–5.4)
SODIUM SERPL-SCNC: 131 MMOL/L (ref 136–145)
SODIUM SERPL-SCNC: 133 MMOL/L (ref 136–145)
WBC # BLD AUTO: 7.19 X10E3/UL (ref 4.5–11)

## 2021-04-15 PROCEDURE — 20000000 HC ICU ROOM

## 2021-04-15 PROCEDURE — 99233 SBSQ HOSP IP/OBS HIGH 50: CPT | Mod: ,,, | Performed by: INTERNAL MEDICINE

## 2021-04-15 PROCEDURE — 99233 PR SUBSEQUENT HOSPITAL CARE,LEVL III: ICD-10-PCS | Mod: ,,, | Performed by: INTERNAL MEDICINE

## 2021-04-15 PROCEDURE — 97110 THERAPEUTIC EXERCISES: CPT

## 2021-04-15 PROCEDURE — 25000003 PHARM REV CODE 250: Performed by: NURSE PRACTITIONER

## 2021-04-15 PROCEDURE — 36415 COLL VENOUS BLD VENIPUNCTURE: CPT

## 2021-04-15 PROCEDURE — 85025 COMPLETE CBC W/AUTO DIFF WBC: CPT

## 2021-04-15 PROCEDURE — 80048 BASIC METABOLIC PNL TOTAL CA: CPT

## 2021-04-15 PROCEDURE — 82962 GLUCOSE BLOOD TEST: CPT

## 2021-04-15 PROCEDURE — 63600175 PHARM REV CODE 636 W HCPCS: Performed by: INTERNAL MEDICINE

## 2021-04-15 PROCEDURE — 94761 N-INVAS EAR/PLS OXIMETRY MLT: CPT

## 2021-04-15 PROCEDURE — 25000003 PHARM REV CODE 250: Performed by: STUDENT IN AN ORGANIZED HEALTH CARE EDUCATION/TRAINING PROGRAM

## 2021-04-15 PROCEDURE — 83735 ASSAY OF MAGNESIUM: CPT

## 2021-04-15 PROCEDURE — 25000003 PHARM REV CODE 250: Performed by: INTERNAL MEDICINE

## 2021-04-15 RX ORDER — LOPERAMIDE HYDROCHLORIDE 2 MG/1
2 CAPSULE ORAL 3 TIMES DAILY
Status: DISPENSED | OUTPATIENT
Start: 2021-04-15 | End: 2021-04-18

## 2021-04-15 RX ADMIN — HEPARIN SODIUM 5000 UNITS: 5000 INJECTION INTRAVENOUS; SUBCUTANEOUS at 08:04

## 2021-04-15 RX ADMIN — INSULIN ASPART 1 UNITS: 100 INJECTION, SOLUTION INTRAVENOUS; SUBCUTANEOUS at 09:04

## 2021-04-15 RX ADMIN — AMLODIPINE BESYLATE 5 MG: 5 TABLET ORAL at 08:04

## 2021-04-15 RX ADMIN — INSULIN ASPART 3 UNITS: 100 INJECTION, SOLUTION INTRAVENOUS; SUBCUTANEOUS at 11:04

## 2021-04-15 RX ADMIN — GABAPENTIN 200 MG: 100 CAPSULE ORAL at 09:04

## 2021-04-15 RX ADMIN — ATORVASTATIN CALCIUM 20 MG: 20 TABLET, FILM COATED ORAL at 09:04

## 2021-04-15 RX ADMIN — HYDROCODONE BITARTRATE AND ACETAMINOPHEN 1 TABLET: 5; 325 TABLET ORAL at 10:04

## 2021-04-15 RX ADMIN — CEFTRIAXONE SODIUM 1 G: 1 INJECTION, POWDER, FOR SOLUTION INTRAMUSCULAR; INTRAVENOUS at 09:04

## 2021-04-15 RX ADMIN — MORPHINE SULFATE 2 MG: 2 INJECTION, SOLUTION INTRAMUSCULAR; INTRAVENOUS at 02:04

## 2021-04-15 RX ADMIN — HYDROCODONE BITARTRATE AND ACETAMINOPHEN 1 TABLET: 5; 325 TABLET ORAL at 08:04

## 2021-04-15 RX ADMIN — LOPERAMIDE HYDROCHLORIDE 2 MG: 2 CAPSULE ORAL at 09:04

## 2021-04-15 RX ADMIN — LOPERAMIDE HYDROCHLORIDE 2 MG: 2 CAPSULE ORAL at 08:04

## 2021-04-15 RX ADMIN — PANTOPRAZOLE SODIUM 40 MG: 40 TABLET, DELAYED RELEASE ORAL at 09:04

## 2021-04-15 RX ADMIN — LOPERAMIDE HYDROCHLORIDE 2 MG: 2 CAPSULE ORAL at 02:04

## 2021-04-16 ENCOUNTER — HISTORICAL (OUTPATIENT)
Dept: ADMINISTRATIVE | Facility: HOSPITAL | Age: 36
End: 2021-04-16

## 2021-04-16 ENCOUNTER — ANESTHESIA (OUTPATIENT)
Dept: SURGERY | Facility: HOSPITAL | Age: 36
DRG: 981 | End: 2021-04-16
Payer: MEDICAID

## 2021-04-16 ENCOUNTER — ANESTHESIA EVENT (OUTPATIENT)
Dept: SURGERY | Facility: HOSPITAL | Age: 36
DRG: 981 | End: 2021-04-16
Payer: MEDICAID

## 2021-04-16 PROBLEM — E11.10 DIABETIC KETOACIDOSIS WITHOUT COMA ASSOCIATED WITH TYPE 2 DIABETES MELLITUS: Status: ACTIVE | Noted: 2021-04-16

## 2021-04-16 LAB
ANION GAP SERPL CALCULATED.3IONS-SCNC: 19 MMOL/L
BASOPHILS # BLD AUTO: 0.03 X10E3/UL (ref 0–0.2)
BASOPHILS NFR BLD AUTO: 0.4 % (ref 0–1)
BUN SERPL-MCNC: 60 MG/DL (ref 7–18)
CALCIUM SERPL-MCNC: 8.2 MG/DL (ref 8.5–10.1)
CHLORIDE SERPL-SCNC: 103 MMOL/L (ref 98–107)
CO2 SERPL-SCNC: 16 MMOL/L (ref 21–32)
CREAT SERPL-MCNC: 2.43 MG/DL (ref 0.55–1.02)
EOSINOPHIL # BLD AUTO: 0.14 X10E3/UL (ref 0–0.5)
EOSINOPHIL NFR BLD AUTO: 1.7 % (ref 1–4)
ERYTHROCYTE [DISTWIDTH] IN BLOOD BY AUTOMATED COUNT: 18.4 % (ref 11.5–14.5)
GLUCOSE SERPL-MCNC: 137 MG/DL (ref 70–105)
GLUCOSE SERPL-MCNC: 148 MG/DL (ref 74–106)
GLUCOSE SERPL-MCNC: 169 MG/DL (ref 70–105)
GLUCOSE SERPL-MCNC: 203 MG/DL (ref 70–105)
HCT VFR BLD AUTO: 31 % (ref 38–47)
HGB BLD-MCNC: 10 G/DL (ref 12–16)
IMM GRANULOCYTES # BLD AUTO: 0.1 X10E3/UL (ref 0–0.04)
IMM GRANULOCYTES NFR BLD: 1.2 % (ref 0–0.4)
LYMPHOCYTES # BLD AUTO: 2.09 X10E3/UL (ref 1–4.8)
LYMPHOCYTES NFR BLD AUTO: 26.1 % (ref 27–41)
MAGNESIUM SERPL-MCNC: 1.2 MG/DL (ref 1.7–2.3)
MCH RBC QN AUTO: 26.7 PG (ref 27–31)
MCHC RBC AUTO-ENTMCNC: 32.3 G/DL (ref 32–36)
MCV RBC AUTO: 82.9 FL (ref 80–96)
MONOCYTES # BLD AUTO: 0.54 X10E3/UL (ref 0–0.8)
MONOCYTES NFR BLD AUTO: 6.7 % (ref 2–6)
MPC BLD CALC-MCNC: 9.8 FL (ref 9.4–12.4)
NEUTROPHILS # BLD AUTO: 5.12 X10E3/UL (ref 1.8–7.7)
NEUTROPHILS NFR BLD AUTO: 63.9 % (ref 53–65)
NRBC # BLD AUTO: 0 X10E3/UL (ref 0–0)
NRBC, AUTO (.00): 0 /100 (ref 0–0)
PLATELET # BLD AUTO: 375 X10E3/UL (ref 150–400)
POTASSIUM SERPL-SCNC: 5.4 MMOL/L (ref 3.5–5.1)
RBC # BLD AUTO: 3.74 X10E6/UL (ref 4.2–5.4)
SODIUM SERPL-SCNC: 133 MMOL/L (ref 136–145)
WBC # BLD AUTO: 8.02 X10E3/UL (ref 4.5–11)

## 2021-04-16 PROCEDURE — 36000706: Performed by: STUDENT IN AN ORGANIZED HEALTH CARE EDUCATION/TRAINING PROGRAM

## 2021-04-16 PROCEDURE — 63600175 PHARM REV CODE 636 W HCPCS: Performed by: HOSPITALIST

## 2021-04-16 PROCEDURE — 88304 PR  SURG PATH,LEVEL III: ICD-10-PCS | Mod: 26,,, | Performed by: PATHOLOGY

## 2021-04-16 PROCEDURE — 37000008 HC ANESTHESIA 1ST 15 MINUTES: Performed by: STUDENT IN AN ORGANIZED HEALTH CARE EDUCATION/TRAINING PROGRAM

## 2021-04-16 PROCEDURE — C1729 CATH, DRAINAGE: HCPCS | Performed by: STUDENT IN AN ORGANIZED HEALTH CARE EDUCATION/TRAINING PROGRAM

## 2021-04-16 PROCEDURE — 63600175 PHARM REV CODE 636 W HCPCS: Performed by: INTERNAL MEDICINE

## 2021-04-16 PROCEDURE — 63600175 PHARM REV CODE 636 W HCPCS

## 2021-04-16 PROCEDURE — 88304 TISSUE EXAM BY PATHOLOGIST: CPT | Mod: 26,,, | Performed by: PATHOLOGY

## 2021-04-16 PROCEDURE — 94761 N-INVAS EAR/PLS OXIMETRY MLT: CPT

## 2021-04-16 PROCEDURE — 36415 COLL VENOUS BLD VENIPUNCTURE: CPT

## 2021-04-16 PROCEDURE — 63600175 PHARM REV CODE 636 W HCPCS: Performed by: ANESTHESIOLOGY

## 2021-04-16 PROCEDURE — D9220A PRA ANESTHESIA: Mod: ANES,,, | Performed by: ANESTHESIOLOGY

## 2021-04-16 PROCEDURE — 83735 ASSAY OF MAGNESIUM: CPT

## 2021-04-16 PROCEDURE — 20000000 HC ICU ROOM

## 2021-04-16 PROCEDURE — 63600175 PHARM REV CODE 636 W HCPCS: Performed by: NURSE ANESTHETIST, CERTIFIED REGISTERED

## 2021-04-16 PROCEDURE — 85025 COMPLETE CBC W/AUTO DIFF WBC: CPT

## 2021-04-16 PROCEDURE — 25000003 PHARM REV CODE 250: Performed by: INTERNAL MEDICINE

## 2021-04-16 PROCEDURE — 36000707: Performed by: STUDENT IN AN ORGANIZED HEALTH CARE EDUCATION/TRAINING PROGRAM

## 2021-04-16 PROCEDURE — 25000003 PHARM REV CODE 250: Performed by: NURSE ANESTHETIST, CERTIFIED REGISTERED

## 2021-04-16 PROCEDURE — 99232 SBSQ HOSP IP/OBS MODERATE 35: CPT | Mod: ,,, | Performed by: INTERNAL MEDICINE

## 2021-04-16 PROCEDURE — D9220A PRA ANESTHESIA: ICD-10-PCS | Mod: CRNA,,, | Performed by: NURSE ANESTHETIST, CERTIFIED REGISTERED

## 2021-04-16 PROCEDURE — 82962 GLUCOSE BLOOD TEST: CPT

## 2021-04-16 PROCEDURE — 37000009 HC ANESTHESIA EA ADD 15 MINS: Performed by: STUDENT IN AN ORGANIZED HEALTH CARE EDUCATION/TRAINING PROGRAM

## 2021-04-16 PROCEDURE — 25000003 PHARM REV CODE 250: Performed by: NURSE PRACTITIONER

## 2021-04-16 PROCEDURE — 44620 REPAIR BOWEL OPENING: CPT | Mod: 22,79,, | Performed by: STUDENT IN AN ORGANIZED HEALTH CARE EDUCATION/TRAINING PROGRAM

## 2021-04-16 PROCEDURE — 27201423 OPTIME MED/SURG SUP & DEVICES STERILE SUPPLY: Performed by: STUDENT IN AN ORGANIZED HEALTH CARE EDUCATION/TRAINING PROGRAM

## 2021-04-16 PROCEDURE — 80048 BASIC METABOLIC PNL TOTAL CA: CPT

## 2021-04-16 PROCEDURE — 44620 PR CLOSE ENTEROSTOMY: ICD-10-PCS | Mod: 22,79,, | Performed by: STUDENT IN AN ORGANIZED HEALTH CARE EDUCATION/TRAINING PROGRAM

## 2021-04-16 PROCEDURE — 99232 PR SUBSEQUENT HOSPITAL CARE,LEVL II: ICD-10-PCS | Mod: ,,, | Performed by: INTERNAL MEDICINE

## 2021-04-16 PROCEDURE — 88304 TISSUE EXAM BY PATHOLOGIST: CPT | Performed by: PATHOLOGY

## 2021-04-16 PROCEDURE — D9220A PRA ANESTHESIA: ICD-10-PCS | Mod: ANES,,, | Performed by: ANESTHESIOLOGY

## 2021-04-16 PROCEDURE — D9220A PRA ANESTHESIA: Mod: CRNA,,, | Performed by: NURSE ANESTHETIST, CERTIFIED REGISTERED

## 2021-04-16 RX ORDER — LIDOCAINE HYDROCHLORIDE 10 MG/ML
1 INJECTION, SOLUTION EPIDURAL; INFILTRATION; INTRACAUDAL; PERINEURAL ONCE
Status: CANCELLED | OUTPATIENT
Start: 2021-04-16 | End: 2021-04-16

## 2021-04-16 RX ORDER — OXYCODONE HYDROCHLORIDE 5 MG/1
5 TABLET ORAL
Status: DISCONTINUED | OUTPATIENT
Start: 2021-04-16 | End: 2021-04-16

## 2021-04-16 RX ORDER — TRAZODONE HYDROCHLORIDE 50 MG/1
50 TABLET ORAL NIGHTLY PRN
Status: DISCONTINUED | OUTPATIENT
Start: 2021-04-16 | End: 2021-04-24 | Stop reason: HOSPADM

## 2021-04-16 RX ORDER — FENTANYL CITRATE 50 UG/ML
INJECTION, SOLUTION INTRAMUSCULAR; INTRAVENOUS
Status: DISCONTINUED | OUTPATIENT
Start: 2021-04-16 | End: 2021-04-16

## 2021-04-16 RX ORDER — SODIUM CHLORIDE AND POTASSIUM CHLORIDE 150; 900 MG/100ML; MG/100ML
INJECTION, SOLUTION INTRAVENOUS CONTINUOUS
Status: DISCONTINUED | OUTPATIENT
Start: 2021-04-16 | End: 2021-04-16

## 2021-04-16 RX ORDER — ONDANSETRON 2 MG/ML
INJECTION INTRAMUSCULAR; INTRAVENOUS
Status: COMPLETED
Start: 2021-04-16 | End: 2021-04-17

## 2021-04-16 RX ORDER — DIPHENHYDRAMINE HYDROCHLORIDE 50 MG/ML
25 INJECTION INTRAMUSCULAR; INTRAVENOUS EVERY 6 HOURS PRN
Status: DISCONTINUED | OUTPATIENT
Start: 2021-04-16 | End: 2021-04-16

## 2021-04-16 RX ORDER — MEPERIDINE HYDROCHLORIDE 25 MG/ML
25 INJECTION INTRAMUSCULAR; INTRAVENOUS; SUBCUTANEOUS EVERY 10 MIN PRN
Status: DISCONTINUED | OUTPATIENT
Start: 2021-04-16 | End: 2021-04-16

## 2021-04-16 RX ORDER — ROCURONIUM BROMIDE 10 MG/ML
INJECTION, SOLUTION INTRAVENOUS
Status: DISCONTINUED | OUTPATIENT
Start: 2021-04-16 | End: 2021-04-16

## 2021-04-16 RX ORDER — ACETAMINOPHEN 500 MG
1000 TABLET ORAL EVERY 6 HOURS PRN
Status: DISCONTINUED | OUTPATIENT
Start: 2021-04-16 | End: 2021-04-16

## 2021-04-16 RX ORDER — ONDANSETRON 2 MG/ML
8 INJECTION INTRAMUSCULAR; INTRAVENOUS EVERY 6 HOURS PRN
Status: DISCONTINUED | OUTPATIENT
Start: 2021-04-16 | End: 2021-04-24 | Stop reason: HOSPADM

## 2021-04-16 RX ORDER — SIMETHICONE 80 MG
1 TABLET,CHEWABLE ORAL 3 TIMES DAILY PRN
Status: DISCONTINUED | OUTPATIENT
Start: 2021-04-16 | End: 2021-04-24 | Stop reason: HOSPADM

## 2021-04-16 RX ORDER — SODIUM CHLORIDE, SODIUM LACTATE, POTASSIUM CHLORIDE, CALCIUM CHLORIDE 600; 310; 30; 20 MG/100ML; MG/100ML; MG/100ML; MG/100ML
INJECTION, SOLUTION INTRAVENOUS
Status: COMPLETED
Start: 2021-04-16 | End: 2021-04-16

## 2021-04-16 RX ORDER — SODIUM CHLORIDE, SODIUM LACTATE, POTASSIUM CHLORIDE, CALCIUM CHLORIDE 600; 310; 30; 20 MG/100ML; MG/100ML; MG/100ML; MG/100ML
INJECTION, SOLUTION INTRAVENOUS CONTINUOUS
Status: CANCELLED | OUTPATIENT
Start: 2021-04-16

## 2021-04-16 RX ORDER — MIDAZOLAM HYDROCHLORIDE 1 MG/ML
INJECTION, SOLUTION INTRAMUSCULAR; INTRAVENOUS
Status: DISCONTINUED | OUTPATIENT
Start: 2021-04-16 | End: 2021-04-16

## 2021-04-16 RX ORDER — ONDANSETRON 2 MG/ML
INJECTION INTRAMUSCULAR; INTRAVENOUS
Status: DISCONTINUED | OUTPATIENT
Start: 2021-04-16 | End: 2021-04-16

## 2021-04-16 RX ORDER — LIDOCAINE HYDROCHLORIDE 20 MG/ML
INJECTION INTRAVENOUS
Status: DISCONTINUED | OUTPATIENT
Start: 2021-04-16 | End: 2021-04-16

## 2021-04-16 RX ORDER — DEXAMETHASONE SODIUM PHOSPHATE 4 MG/ML
INJECTION, SOLUTION INTRA-ARTICULAR; INTRALESIONAL; INTRAMUSCULAR; INTRAVENOUS; SOFT TISSUE
Status: DISCONTINUED | OUTPATIENT
Start: 2021-04-16 | End: 2021-04-16

## 2021-04-16 RX ORDER — PROPOFOL 10 MG/ML
VIAL (ML) INTRAVENOUS
Status: DISCONTINUED | OUTPATIENT
Start: 2021-04-16 | End: 2021-04-16

## 2021-04-16 RX ORDER — MORPHINE SULFATE 10 MG/ML
4 INJECTION INTRAMUSCULAR; INTRAVENOUS; SUBCUTANEOUS EVERY 5 MIN PRN
Status: DISCONTINUED | OUTPATIENT
Start: 2021-04-16 | End: 2021-04-16

## 2021-04-16 RX ORDER — ONDANSETRON 2 MG/ML
4 INJECTION INTRAMUSCULAR; INTRAVENOUS DAILY PRN
Status: DISCONTINUED | OUTPATIENT
Start: 2021-04-16 | End: 2021-04-16

## 2021-04-16 RX ORDER — BISACODYL 5 MG
10 TABLET, DELAYED RELEASE (ENTERIC COATED) ORAL DAILY PRN
Status: DISCONTINUED | OUTPATIENT
Start: 2021-04-16 | End: 2021-04-24 | Stop reason: HOSPADM

## 2021-04-16 RX ORDER — GUAIFENESIN/DEXTROMETHORPHAN 100-10MG/5
10 SYRUP ORAL EVERY 6 HOURS PRN
Status: DISCONTINUED | OUTPATIENT
Start: 2021-04-16 | End: 2021-04-24 | Stop reason: HOSPADM

## 2021-04-16 RX ORDER — SODIUM CHLORIDE, SODIUM LACTATE, POTASSIUM CHLORIDE, CALCIUM CHLORIDE 600; 310; 30; 20 MG/100ML; MG/100ML; MG/100ML; MG/100ML
INJECTION, SOLUTION INTRAVENOUS CONTINUOUS
Status: DISCONTINUED | OUTPATIENT
Start: 2021-04-16 | End: 2021-04-18

## 2021-04-16 RX ORDER — HYDROMORPHONE HYDROCHLORIDE 2 MG/ML
0.5 INJECTION, SOLUTION INTRAMUSCULAR; INTRAVENOUS; SUBCUTANEOUS EVERY 5 MIN PRN
Status: DISCONTINUED | OUTPATIENT
Start: 2021-04-16 | End: 2021-04-16

## 2021-04-16 RX ADMIN — MIDAZOLAM HYDROCHLORIDE 2 MG: 1 INJECTION, SOLUTION INTRAMUSCULAR; INTRAVENOUS at 10:04

## 2021-04-16 RX ADMIN — ONDANSETRON 8 MG: 2 INJECTION INTRAMUSCULAR; INTRAVENOUS at 10:04

## 2021-04-16 RX ADMIN — Medication 200 MG: at 10:04

## 2021-04-16 RX ADMIN — SODIUM CHLORIDE, POTASSIUM CHLORIDE, SODIUM LACTATE AND CALCIUM CHLORIDE: 600; 310; 30; 20 INJECTION, SOLUTION INTRAVENOUS at 08:04

## 2021-04-16 RX ADMIN — MORPHINE SULFATE 4 MG: 10 INJECTION, SOLUTION INTRAMUSCULAR; INTRAVENOUS at 01:04

## 2021-04-16 RX ADMIN — CEFAZOLIN 2 G: 1 INJECTION, POWDER, FOR SOLUTION INTRAMUSCULAR; INTRAVENOUS; PARENTERAL at 10:04

## 2021-04-16 RX ADMIN — ONDANSETRON 8 MG: 2 INJECTION INTRAMUSCULAR; INTRAVENOUS at 08:04

## 2021-04-16 RX ADMIN — LOPERAMIDE HYDROCHLORIDE 2 MG: 2 CAPSULE ORAL at 08:04

## 2021-04-16 RX ADMIN — SODIUM CHLORIDE, POTASSIUM CHLORIDE, SODIUM LACTATE AND CALCIUM CHLORIDE: 600; 310; 30; 20 INJECTION, SOLUTION INTRAVENOUS at 11:04

## 2021-04-16 RX ADMIN — FENTANYL CITRATE 100 MCG: 50 INJECTION INTRAMUSCULAR; INTRAVENOUS at 10:04

## 2021-04-16 RX ADMIN — LIDOCAINE HYDROCHLORIDE 100 MG: 20 INJECTION, SOLUTION INTRAVENOUS at 10:04

## 2021-04-16 RX ADMIN — DEXAMETHASONE SODIUM PHOSPHATE 8 MG: 4 INJECTION, SOLUTION INTRA-ARTICULAR; INTRALESIONAL; INTRAMUSCULAR; INTRAVENOUS; SOFT TISSUE at 10:04

## 2021-04-16 RX ADMIN — ATORVASTATIN CALCIUM 20 MG: 20 TABLET, FILM COATED ORAL at 08:04

## 2021-04-16 RX ADMIN — GABAPENTIN 200 MG: 100 CAPSULE ORAL at 08:04

## 2021-04-16 RX ADMIN — CEFTRIAXONE SODIUM 1 G: 1 INJECTION, POWDER, FOR SOLUTION INTRAMUSCULAR; INTRAVENOUS at 08:04

## 2021-04-16 RX ADMIN — MORPHINE SULFATE 2 MG: 2 INJECTION, SOLUTION INTRAMUSCULAR; INTRAVENOUS at 04:04

## 2021-04-16 RX ADMIN — MORPHINE SULFATE 2 MG: 2 INJECTION, SOLUTION INTRAMUSCULAR; INTRAVENOUS at 08:04

## 2021-04-16 RX ADMIN — INSULIN ASPART 2 UNITS: 100 INJECTION, SOLUTION INTRAVENOUS; SUBCUTANEOUS at 04:04

## 2021-04-16 RX ADMIN — SUGAMMADEX 200 MG: 100 INJECTION, SOLUTION INTRAVENOUS at 12:04

## 2021-04-16 RX ADMIN — ROCURONIUM BROMIDE 40 MG: 10 INJECTION INTRAVENOUS at 10:04

## 2021-04-17 LAB
ANION GAP SERPL CALCULATED.3IONS-SCNC: 19 MMOL/L (ref 7–16)
BUN SERPL-MCNC: 48 MG/DL (ref 7–18)
BUN/CREAT SERPL: 27 (ref 6–20)
CALCIUM SERPL-MCNC: 8.5 MG/DL (ref 8.5–10.1)
CHLORIDE SERPL-SCNC: 107 MMOL/L (ref 98–107)
CO2 SERPL-SCNC: 16 MMOL/L (ref 21–32)
CREAT SERPL-MCNC: 1.81 MG/DL (ref 0.55–1.02)
GLUCOSE SERPL-MCNC: 154 MG/DL (ref 70–105)
GLUCOSE SERPL-MCNC: 186 MG/DL (ref 70–105)
GLUCOSE SERPL-MCNC: 200 MG/DL (ref 74–106)
GLUCOSE SERPL-MCNC: 216 MG/DL (ref 70–105)
MAGNESIUM SERPL-MCNC: 1 MG/DL (ref 1.7–2.3)
POTASSIUM SERPL-SCNC: 5.8 MMOL/L (ref 3.5–5.1)
REPORT: NORMAL
REPORT: NORMAL
SODIUM SERPL-SCNC: 136 MMOL/L (ref 136–145)

## 2021-04-17 PROCEDURE — 63600175 PHARM REV CODE 636 W HCPCS: Performed by: SURGERY

## 2021-04-17 PROCEDURE — 63600175 PHARM REV CODE 636 W HCPCS: Performed by: INTERNAL MEDICINE

## 2021-04-17 PROCEDURE — 63600175 PHARM REV CODE 636 W HCPCS: Performed by: HOSPITALIST

## 2021-04-17 PROCEDURE — 20000000 HC ICU ROOM

## 2021-04-17 PROCEDURE — 80048 BASIC METABOLIC PNL TOTAL CA: CPT | Performed by: NURSE PRACTITIONER

## 2021-04-17 PROCEDURE — 82962 GLUCOSE BLOOD TEST: CPT

## 2021-04-17 PROCEDURE — 36415 COLL VENOUS BLD VENIPUNCTURE: CPT | Performed by: NURSE PRACTITIONER

## 2021-04-17 PROCEDURE — 63600175 PHARM REV CODE 636 W HCPCS

## 2021-04-17 PROCEDURE — 25000003 PHARM REV CODE 250: Performed by: INTERNAL MEDICINE

## 2021-04-17 PROCEDURE — 63600175 PHARM REV CODE 636 W HCPCS: Performed by: STUDENT IN AN ORGANIZED HEALTH CARE EDUCATION/TRAINING PROGRAM

## 2021-04-17 PROCEDURE — 99233 SBSQ HOSP IP/OBS HIGH 50: CPT | Mod: ,,, | Performed by: INTERNAL MEDICINE

## 2021-04-17 PROCEDURE — 51798 US URINE CAPACITY MEASURE: CPT

## 2021-04-17 PROCEDURE — 83735 ASSAY OF MAGNESIUM: CPT | Performed by: NURSE PRACTITIONER

## 2021-04-17 PROCEDURE — 99233 PR SUBSEQUENT HOSPITAL CARE,LEVL III: ICD-10-PCS | Mod: ,,, | Performed by: INTERNAL MEDICINE

## 2021-04-17 PROCEDURE — 25000003 PHARM REV CODE 250: Performed by: NURSE PRACTITIONER

## 2021-04-17 PROCEDURE — 94761 N-INVAS EAR/PLS OXIMETRY MLT: CPT

## 2021-04-17 RX ORDER — MAGNESIUM SULFATE HEPTAHYDRATE 40 MG/ML
4 INJECTION, SOLUTION INTRAVENOUS ONCE
Status: COMPLETED | OUTPATIENT
Start: 2021-04-17 | End: 2021-04-17

## 2021-04-17 RX ORDER — ENOXAPARIN SODIUM 100 MG/ML
40 INJECTION SUBCUTANEOUS EVERY 24 HOURS
Status: DISCONTINUED | OUTPATIENT
Start: 2021-04-17 | End: 2021-04-24 | Stop reason: HOSPADM

## 2021-04-17 RX ADMIN — MORPHINE SULFATE 2 MG: 2 INJECTION, SOLUTION INTRAMUSCULAR; INTRAVENOUS at 12:04

## 2021-04-17 RX ADMIN — LOPERAMIDE HYDROCHLORIDE 2 MG: 2 CAPSULE ORAL at 08:04

## 2021-04-17 RX ADMIN — ATORVASTATIN CALCIUM 20 MG: 20 TABLET, FILM COATED ORAL at 08:04

## 2021-04-17 RX ADMIN — INSULIN ASPART 2 UNITS: 100 INJECTION, SOLUTION INTRAVENOUS; SUBCUTANEOUS at 04:04

## 2021-04-17 RX ADMIN — GABAPENTIN 200 MG: 100 CAPSULE ORAL at 08:04

## 2021-04-17 RX ADMIN — CEFTRIAXONE SODIUM 1 G: 1 INJECTION, POWDER, FOR SOLUTION INTRAMUSCULAR; INTRAVENOUS at 06:04

## 2021-04-17 RX ADMIN — AMLODIPINE BESYLATE 5 MG: 5 TABLET ORAL at 08:04

## 2021-04-17 RX ADMIN — SODIUM POLYSTYRENE SULFONATE 30 G: 15 SUSPENSION ORAL; RECTAL at 09:04

## 2021-04-17 RX ADMIN — ENOXAPARIN SODIUM 40 MG: 40 INJECTION SUBCUTANEOUS at 04:04

## 2021-04-17 RX ADMIN — MORPHINE SULFATE 2 MG: 2 INJECTION, SOLUTION INTRAMUSCULAR; INTRAVENOUS at 05:04

## 2021-04-17 RX ADMIN — ONDANSETRON 8 MG: 2 INJECTION INTRAMUSCULAR; INTRAVENOUS at 08:04

## 2021-04-17 RX ADMIN — ONDANSETRON 8 MG: 2 INJECTION INTRAMUSCULAR; INTRAVENOUS at 06:04

## 2021-04-17 RX ADMIN — ONDANSETRON 8 MG: 2 INJECTION INTRAMUSCULAR; INTRAVENOUS at 12:04

## 2021-04-17 RX ADMIN — MAGNESIUM SULFATE IN WATER 4 G: 40 INJECTION, SOLUTION INTRAVENOUS at 08:04

## 2021-04-17 RX ADMIN — PANTOPRAZOLE SODIUM 40 MG: 40 TABLET, DELAYED RELEASE ORAL at 08:04

## 2021-04-17 RX ADMIN — MORPHINE SULFATE 2 MG: 2 INJECTION, SOLUTION INTRAMUSCULAR; INTRAVENOUS at 08:04

## 2021-04-18 PROBLEM — E87.1 HYPONATREMIA: Status: RESOLVED | Noted: 2021-04-12 | Resolved: 2021-04-18

## 2021-04-18 PROBLEM — R11.0 NAUSEA: Status: RESOLVED | Noted: 2021-04-12 | Resolved: 2021-04-18

## 2021-04-18 PROBLEM — N39.0 SEPSIS DUE TO GRAM-NEGATIVE UTI: Status: RESOLVED | Noted: 2021-04-12 | Resolved: 2021-04-18

## 2021-04-18 PROBLEM — A41.50 SEPSIS DUE TO GRAM-NEGATIVE UTI: Status: RESOLVED | Noted: 2021-04-12 | Resolved: 2021-04-18

## 2021-04-18 PROBLEM — E11.10 DIABETIC KETOACIDOSIS WITHOUT COMA ASSOCIATED WITH TYPE 2 DIABETES MELLITUS: Status: ACTIVE | Noted: 2021-04-18

## 2021-04-18 PROBLEM — E11.10 DIABETIC KETOACIDOSIS WITHOUT COMA ASSOCIATED WITH TYPE 2 DIABETES MELLITUS: Status: RESOLVED | Noted: 2021-04-16 | Resolved: 2021-04-18

## 2021-04-18 LAB
ALBUMIN SERPL BCP-MCNC: 2.3 G/DL (ref 3.5–5)
ALBUMIN/GLOB SERPL: 0.5 {RATIO}
ALP SERPL-CCNC: 195 U/L (ref 37–98)
ALT SERPL W P-5'-P-CCNC: 17 U/L (ref 13–56)
ANION GAP SERPL CALCULATED.3IONS-SCNC: 18 MMOL/L (ref 7–16)
AST SERPL W P-5'-P-CCNC: 19 U/L (ref 15–37)
BASOPHILS # BLD AUTO: 0.03 K/UL (ref 0–0.2)
BASOPHILS NFR BLD AUTO: 0.3 % (ref 0–1)
BILIRUB SERPL-MCNC: 0.3 MG/DL (ref 0–1.2)
BUN SERPL-MCNC: 43 MG/DL (ref 7–18)
BUN/CREAT SERPL: 21 (ref 6–20)
CALCIUM SERPL-MCNC: 8.2 MG/DL (ref 8.5–10.1)
CHLORIDE SERPL-SCNC: 110 MMOL/L (ref 98–107)
CO2 SERPL-SCNC: 18 MMOL/L (ref 21–32)
CREAT SERPL-MCNC: 2.09 MG/DL (ref 0.55–1.02)
DIFFERENTIAL METHOD BLD: ABNORMAL
EOSINOPHIL # BLD AUTO: 0.04 K/UL (ref 0–0.5)
EOSINOPHIL NFR BLD AUTO: 0.4 % (ref 1–4)
ERYTHROCYTE [DISTWIDTH] IN BLOOD BY AUTOMATED COUNT: 18.9 % (ref 11.5–14.5)
GLOBULIN SER-MCNC: 4.9 G/DL (ref 2–4)
GLUCOSE SERPL-MCNC: 167 MG/DL (ref 74–106)
GLUCOSE SERPL-MCNC: 211 MG/DL (ref 70–105)
GLUCOSE SERPL-MCNC: 243 MG/DL (ref 70–105)
HCT VFR BLD AUTO: 25.1 % (ref 38–47)
HGB BLD-MCNC: 8 G/DL (ref 12–16)
IMM GRANULOCYTES # BLD AUTO: 0.1 K/UL (ref 0–0.04)
IMM GRANULOCYTES NFR BLD: 0.9 % (ref 0–0.4)
LYMPHOCYTES # BLD AUTO: 2.01 K/UL (ref 1–4.8)
LYMPHOCYTES NFR BLD AUTO: 17.8 % (ref 27–41)
MAGNESIUM SERPL-MCNC: 2.2 MG/DL (ref 1.7–2.3)
MCH RBC QN AUTO: 26.8 PG (ref 27–31)
MCHC RBC AUTO-ENTMCNC: 31.9 G/DL (ref 32–36)
MCV RBC AUTO: 84.2 FL (ref 80–96)
MONOCYTES # BLD AUTO: 1.06 K/UL (ref 0–0.8)
MONOCYTES NFR BLD AUTO: 9.4 % (ref 2–6)
MPC BLD CALC-MCNC: 10.1 FL (ref 9.4–12.4)
NEUTROPHILS # BLD AUTO: 8.04 K/UL (ref 1.8–7.7)
NEUTROPHILS NFR BLD AUTO: 71.2 % (ref 53–65)
NRBC # BLD AUTO: 0 X10E3/UL
NRBC, AUTO (.00): 0 %
PLATELET # BLD AUTO: 368 K/UL (ref 150–400)
POTASSIUM SERPL-SCNC: 4.5 MMOL/L (ref 3.5–5.1)
PROT SERPL-MCNC: 7.2 G/DL (ref 6.4–8.2)
RBC # BLD AUTO: 2.98 M/UL (ref 4.2–5.4)
SODIUM SERPL-SCNC: 141 MMOL/L (ref 136–145)
WBC # BLD AUTO: 11.28 K/UL (ref 4.5–11)

## 2021-04-18 PROCEDURE — 82962 GLUCOSE BLOOD TEST: CPT

## 2021-04-18 PROCEDURE — 83735 ASSAY OF MAGNESIUM: CPT | Performed by: INTERNAL MEDICINE

## 2021-04-18 PROCEDURE — 63600175 PHARM REV CODE 636 W HCPCS: Performed by: SURGERY

## 2021-04-18 PROCEDURE — 99232 PR SUBSEQUENT HOSPITAL CARE,LEVL II: ICD-10-PCS | Mod: ,,, | Performed by: INTERNAL MEDICINE

## 2021-04-18 PROCEDURE — 25000003 PHARM REV CODE 250: Performed by: NURSE PRACTITIONER

## 2021-04-18 PROCEDURE — 25000003 PHARM REV CODE 250: Performed by: INTERNAL MEDICINE

## 2021-04-18 PROCEDURE — 97161 PT EVAL LOW COMPLEX 20 MIN: CPT

## 2021-04-18 PROCEDURE — 11000001 HC ACUTE MED/SURG PRIVATE ROOM

## 2021-04-18 PROCEDURE — 80053 COMPREHEN METABOLIC PANEL: CPT | Performed by: INTERNAL MEDICINE

## 2021-04-18 PROCEDURE — 63600175 PHARM REV CODE 636 W HCPCS: Performed by: INTERNAL MEDICINE

## 2021-04-18 PROCEDURE — 99232 SBSQ HOSP IP/OBS MODERATE 35: CPT | Mod: ,,, | Performed by: INTERNAL MEDICINE

## 2021-04-18 PROCEDURE — 94761 N-INVAS EAR/PLS OXIMETRY MLT: CPT

## 2021-04-18 PROCEDURE — 85025 COMPLETE CBC W/AUTO DIFF WBC: CPT | Performed by: INTERNAL MEDICINE

## 2021-04-18 RX ADMIN — ENOXAPARIN SODIUM 40 MG: 40 INJECTION SUBCUTANEOUS at 05:04

## 2021-04-18 RX ADMIN — INSULIN ASPART 2 UNITS: 100 INJECTION, SOLUTION INTRAVENOUS; SUBCUTANEOUS at 05:04

## 2021-04-18 RX ADMIN — INSULIN ASPART 2 UNITS: 100 INJECTION, SOLUTION INTRAVENOUS; SUBCUTANEOUS at 12:04

## 2021-04-18 RX ADMIN — INSULIN ASPART 1 UNITS: 100 INJECTION, SOLUTION INTRAVENOUS; SUBCUTANEOUS at 09:04

## 2021-04-18 RX ADMIN — SODIUM CHLORIDE, POTASSIUM CHLORIDE, SODIUM LACTATE AND CALCIUM CHLORIDE: 600; 310; 30; 20 INJECTION, SOLUTION INTRAVENOUS at 05:04

## 2021-04-18 RX ADMIN — ACETAMINOPHEN 650 MG: 325 TABLET ORAL at 07:04

## 2021-04-18 RX ADMIN — ATORVASTATIN CALCIUM 20 MG: 20 TABLET, FILM COATED ORAL at 09:04

## 2021-04-18 RX ADMIN — GABAPENTIN 200 MG: 100 CAPSULE ORAL at 09:04

## 2021-04-18 RX ADMIN — MORPHINE SULFATE 2 MG: 2 INJECTION, SOLUTION INTRAMUSCULAR; INTRAVENOUS at 10:04

## 2021-04-18 RX ADMIN — LOPERAMIDE HYDROCHLORIDE 2 MG: 2 CAPSULE ORAL at 05:04

## 2021-04-18 RX ADMIN — LOPERAMIDE HYDROCHLORIDE 2 MG: 2 CAPSULE ORAL at 08:04

## 2021-04-18 RX ADMIN — HYDROCODONE BITARTRATE AND ACETAMINOPHEN 1 TABLET: 5; 325 TABLET ORAL at 11:04

## 2021-04-18 RX ADMIN — PANTOPRAZOLE SODIUM 40 MG: 40 TABLET, DELAYED RELEASE ORAL at 08:04

## 2021-04-18 RX ADMIN — AMLODIPINE BESYLATE 5 MG: 5 TABLET ORAL at 08:04

## 2021-04-18 RX ADMIN — CEFTRIAXONE SODIUM 1 G: 1 INJECTION, POWDER, FOR SOLUTION INTRAMUSCULAR; INTRAVENOUS at 09:04

## 2021-04-18 RX ADMIN — GABAPENTIN 200 MG: 100 CAPSULE ORAL at 08:04

## 2021-04-19 LAB
ANION GAP SERPL CALCULATED.3IONS-SCNC: 17 MMOL/L (ref 7–16)
BUN SERPL-MCNC: 56 MG/DL (ref 7–18)
BUN/CREAT SERPL: 27 (ref 6–20)
CALCIUM SERPL-MCNC: 7.6 MG/DL (ref 8.5–10.1)
CHLORIDE SERPL-SCNC: 103 MMOL/L (ref 98–107)
CO2 SERPL-SCNC: 19 MMOL/L (ref 21–32)
CREAT SERPL-MCNC: 2.07 MG/DL (ref 0.55–1.02)
GLUCOSE SERPL-MCNC: 148 MG/DL (ref 70–105)
GLUCOSE SERPL-MCNC: 194 MG/DL (ref 74–106)
GLUCOSE SERPL-MCNC: 227 MG/DL (ref 70–105)
GLUCOSE SERPL-MCNC: 234 MG/DL (ref 70–105)
GLUCOSE SERPL-MCNC: 288 MG/DL (ref 70–105)
POTASSIUM SERPL-SCNC: 3.8 MMOL/L (ref 3.5–5.1)
SODIUM SERPL-SCNC: 135 MMOL/L (ref 136–145)

## 2021-04-19 PROCEDURE — 80048 BASIC METABOLIC PNL TOTAL CA: CPT | Performed by: NURSE PRACTITIONER

## 2021-04-19 PROCEDURE — 63600175 PHARM REV CODE 636 W HCPCS: Performed by: INTERNAL MEDICINE

## 2021-04-19 PROCEDURE — 63600175 PHARM REV CODE 636 W HCPCS: Performed by: SURGERY

## 2021-04-19 PROCEDURE — 25000003 PHARM REV CODE 250: Performed by: INTERNAL MEDICINE

## 2021-04-19 PROCEDURE — 25000003 PHARM REV CODE 250: Performed by: FAMILY MEDICINE

## 2021-04-19 PROCEDURE — 94761 N-INVAS EAR/PLS OXIMETRY MLT: CPT

## 2021-04-19 PROCEDURE — 25000003 PHARM REV CODE 250: Performed by: STUDENT IN AN ORGANIZED HEALTH CARE EDUCATION/TRAINING PROGRAM

## 2021-04-19 PROCEDURE — 36415 COLL VENOUS BLD VENIPUNCTURE: CPT | Performed by: NURSE PRACTITIONER

## 2021-04-19 PROCEDURE — 11000001 HC ACUTE MED/SURG PRIVATE ROOM

## 2021-04-19 PROCEDURE — 97116 GAIT TRAINING THERAPY: CPT

## 2021-04-19 PROCEDURE — 82962 GLUCOSE BLOOD TEST: CPT

## 2021-04-19 PROCEDURE — 97535 SELF CARE MNGMENT TRAINING: CPT

## 2021-04-19 RX ORDER — LOPERAMIDE HYDROCHLORIDE 2 MG/1
2 CAPSULE ORAL DAILY
Status: DISCONTINUED | OUTPATIENT
Start: 2021-04-19 | End: 2021-04-23

## 2021-04-19 RX ORDER — SODIUM CHLORIDE 9 MG/ML
INJECTION, SOLUTION INTRAVENOUS CONTINUOUS
Status: DISPENSED | OUTPATIENT
Start: 2021-04-19 | End: 2021-04-20

## 2021-04-19 RX ADMIN — SODIUM CHLORIDE: 9 INJECTION, SOLUTION INTRAVENOUS at 05:04

## 2021-04-19 RX ADMIN — ATORVASTATIN CALCIUM 20 MG: 20 TABLET, FILM COATED ORAL at 09:04

## 2021-04-19 RX ADMIN — INSULIN ASPART 3 UNITS: 100 INJECTION, SOLUTION INTRAVENOUS; SUBCUTANEOUS at 05:04

## 2021-04-19 RX ADMIN — HYDROCODONE BITARTRATE AND ACETAMINOPHEN 1 TABLET: 5; 325 TABLET ORAL at 01:04

## 2021-04-19 RX ADMIN — CEFTRIAXONE SODIUM 1 G: 1 INJECTION, POWDER, FOR SOLUTION INTRAMUSCULAR; INTRAVENOUS at 06:04

## 2021-04-19 RX ADMIN — GABAPENTIN 200 MG: 100 CAPSULE ORAL at 09:04

## 2021-04-19 RX ADMIN — INSULIN ASPART 1 UNITS: 100 INJECTION, SOLUTION INTRAVENOUS; SUBCUTANEOUS at 09:04

## 2021-04-19 RX ADMIN — INSULIN ASPART 2 UNITS: 100 INJECTION, SOLUTION INTRAVENOUS; SUBCUTANEOUS at 11:04

## 2021-04-19 RX ADMIN — LOPERAMIDE HYDROCHLORIDE 2 MG: 2 CAPSULE ORAL at 09:04

## 2021-04-19 RX ADMIN — ENOXAPARIN SODIUM 40 MG: 40 INJECTION SUBCUTANEOUS at 05:04

## 2021-04-19 RX ADMIN — AMLODIPINE BESYLATE 5 MG: 5 TABLET ORAL at 09:04

## 2021-04-19 RX ADMIN — PANTOPRAZOLE SODIUM 40 MG: 40 TABLET, DELAYED RELEASE ORAL at 09:04

## 2021-04-19 RX ADMIN — PSYLLIUM HUSK 1 PACKET: 3.4 POWDER ORAL at 10:04

## 2021-04-19 RX ADMIN — MORPHINE SULFATE 2 MG: 2 INJECTION, SOLUTION INTRAMUSCULAR; INTRAVENOUS at 06:04

## 2021-04-19 RX ADMIN — HYDROCODONE BITARTRATE AND ACETAMINOPHEN 1 TABLET: 5; 325 TABLET ORAL at 10:04

## 2021-04-20 LAB
ANION GAP SERPL CALCULATED.3IONS-SCNC: 16 MMOL/L (ref 7–16)
BASOPHILS # BLD AUTO: 0.03 K/UL (ref 0–0.2)
BASOPHILS NFR BLD AUTO: 0.4 % (ref 0–1)
BUN SERPL-MCNC: 52 MG/DL (ref 7–18)
BUN/CREAT SERPL: 29 (ref 6–20)
CALCIUM SERPL-MCNC: 7.5 MG/DL (ref 8.5–10.1)
CHLORIDE SERPL-SCNC: 104 MMOL/L (ref 98–107)
CO2 SERPL-SCNC: 20 MMOL/L (ref 21–32)
CREAT SERPL-MCNC: 1.78 MG/DL (ref 0.55–1.02)
DIFFERENTIAL METHOD BLD: ABNORMAL
EOSINOPHIL # BLD AUTO: 0.09 K/UL (ref 0–0.5)
EOSINOPHIL NFR BLD AUTO: 1.1 % (ref 1–4)
ERYTHROCYTE [DISTWIDTH] IN BLOOD BY AUTOMATED COUNT: 18.6 % (ref 11.5–14.5)
GLUCOSE SERPL-MCNC: 166 MG/DL (ref 70–105)
GLUCOSE SERPL-MCNC: 202 MG/DL (ref 70–105)
GLUCOSE SERPL-MCNC: 226 MG/DL (ref 74–106)
GLUCOSE SERPL-MCNC: 258 MG/DL (ref 70–105)
GLUCOSE SERPL-MCNC: 261 MG/DL (ref 70–105)
HCT VFR BLD AUTO: 22.6 % (ref 38–47)
HGB BLD-MCNC: 7.4 G/DL (ref 12–16)
IMM GRANULOCYTES # BLD AUTO: 0.1 K/UL (ref 0–0.04)
IMM GRANULOCYTES NFR BLD: 1.2 % (ref 0–0.4)
INSULIN SERPL-ACNC: NORMAL U[IU]/ML
LAB AP CLINICAL INFORMATION: NORMAL
LAB AP DIAGNOSIS - HISTORICAL: NORMAL
LAB AP GROSS PATHOLOGY - HISTORICAL: NORMAL
LAB AP SPECIMEN SUBMITTED - HISTORICAL: NORMAL
LYMPHOCYTES # BLD AUTO: 1.91 K/UL (ref 1–4.8)
LYMPHOCYTES NFR BLD AUTO: 23.7 % (ref 27–41)
MCH RBC QN AUTO: 26.7 PG (ref 27–31)
MCHC RBC AUTO-ENTMCNC: 32.7 G/DL (ref 32–36)
MCV RBC AUTO: 81.6 FL (ref 80–96)
MONOCYTES # BLD AUTO: 0.94 K/UL (ref 0–0.8)
MONOCYTES NFR BLD AUTO: 11.6 % (ref 2–6)
MPC BLD CALC-MCNC: 10.4 FL (ref 9.4–12.4)
NEUTROPHILS # BLD AUTO: 5 K/UL (ref 1.8–7.7)
NEUTROPHILS NFR BLD AUTO: 62 % (ref 53–65)
NRBC # BLD AUTO: 0 X10E3/UL
NRBC, AUTO (.00): 0 %
PLATELET # BLD AUTO: 309 K/UL (ref 150–400)
POTASSIUM SERPL-SCNC: 4.2 MMOL/L (ref 3.5–5.1)
RBC # BLD AUTO: 2.77 M/UL (ref 4.2–5.4)
SODIUM SERPL-SCNC: 136 MMOL/L (ref 136–145)
WBC # BLD AUTO: 8.07 K/UL (ref 4.5–11)

## 2021-04-20 PROCEDURE — 82962 GLUCOSE BLOOD TEST: CPT

## 2021-04-20 PROCEDURE — 63600175 PHARM REV CODE 636 W HCPCS: Performed by: STUDENT IN AN ORGANIZED HEALTH CARE EDUCATION/TRAINING PROGRAM

## 2021-04-20 PROCEDURE — 63600175 PHARM REV CODE 636 W HCPCS: Performed by: INTERNAL MEDICINE

## 2021-04-20 PROCEDURE — 85025 COMPLETE CBC W/AUTO DIFF WBC: CPT | Performed by: FAMILY MEDICINE

## 2021-04-20 PROCEDURE — 97110 THERAPEUTIC EXERCISES: CPT | Mod: CO

## 2021-04-20 PROCEDURE — 63600175 PHARM REV CODE 636 W HCPCS: Performed by: SURGERY

## 2021-04-20 PROCEDURE — 25000003 PHARM REV CODE 250: Performed by: STUDENT IN AN ORGANIZED HEALTH CARE EDUCATION/TRAINING PROGRAM

## 2021-04-20 PROCEDURE — 25000003 PHARM REV CODE 250: Performed by: FAMILY MEDICINE

## 2021-04-20 PROCEDURE — 97116 GAIT TRAINING THERAPY: CPT

## 2021-04-20 PROCEDURE — C9399 UNCLASSIFIED DRUGS OR BIOLOG: HCPCS | Performed by: FAMILY MEDICINE

## 2021-04-20 PROCEDURE — 63600175 PHARM REV CODE 636 W HCPCS: Performed by: FAMILY MEDICINE

## 2021-04-20 PROCEDURE — 11000001 HC ACUTE MED/SURG PRIVATE ROOM

## 2021-04-20 PROCEDURE — 82565 ASSAY OF CREATININE: CPT | Performed by: FAMILY MEDICINE

## 2021-04-20 PROCEDURE — 94761 N-INVAS EAR/PLS OXIMETRY MLT: CPT

## 2021-04-20 PROCEDURE — 25000003 PHARM REV CODE 250: Performed by: INTERNAL MEDICINE

## 2021-04-20 RX ORDER — INSULIN ASPART 100 [IU]/ML
1-10 INJECTION, SOLUTION INTRAVENOUS; SUBCUTANEOUS
Status: DISCONTINUED | OUTPATIENT
Start: 2021-04-20 | End: 2021-04-24 | Stop reason: HOSPADM

## 2021-04-20 RX ORDER — CHOLESTYRAMINE 4 G/9G
1 POWDER, FOR SUSPENSION ORAL 2 TIMES DAILY
Status: DISCONTINUED | OUTPATIENT
Start: 2021-04-20 | End: 2021-04-22

## 2021-04-20 RX ADMIN — GABAPENTIN 200 MG: 100 CAPSULE ORAL at 08:04

## 2021-04-20 RX ADMIN — HYDROCODONE BITARTRATE AND ACETAMINOPHEN 1 TABLET: 5; 325 TABLET ORAL at 05:04

## 2021-04-20 RX ADMIN — ATORVASTATIN CALCIUM 20 MG: 20 TABLET, FILM COATED ORAL at 10:04

## 2021-04-20 RX ADMIN — CHOLESTYRAMINE 4 G: 4 POWDER, FOR SUSPENSION ORAL at 10:04

## 2021-04-20 RX ADMIN — SODIUM CHLORIDE: 9 INJECTION, SOLUTION INTRAVENOUS at 02:04

## 2021-04-20 RX ADMIN — AMLODIPINE BESYLATE 5 MG: 5 TABLET ORAL at 08:04

## 2021-04-20 RX ADMIN — LOPERAMIDE HYDROCHLORIDE 2 MG: 2 CAPSULE ORAL at 08:04

## 2021-04-20 RX ADMIN — INSULIN ASPART 1 UNITS: 100 INJECTION, SOLUTION INTRAVENOUS; SUBCUTANEOUS at 10:04

## 2021-04-20 RX ADMIN — INSULIN ASPART 6 UNITS: 100 INJECTION, SOLUTION INTRAVENOUS; SUBCUTANEOUS at 04:04

## 2021-04-20 RX ADMIN — CHOLESTYRAMINE 4 G: 4 POWDER, FOR SUSPENSION ORAL at 09:04

## 2021-04-20 RX ADMIN — INSULIN ASPART 3 UNITS: 100 INJECTION, SOLUTION INTRAVENOUS; SUBCUTANEOUS at 05:04

## 2021-04-20 RX ADMIN — GABAPENTIN 200 MG: 100 CAPSULE ORAL at 10:04

## 2021-04-20 RX ADMIN — ENOXAPARIN SODIUM 40 MG: 40 INJECTION SUBCUTANEOUS at 04:04

## 2021-04-20 RX ADMIN — SODIUM CHLORIDE: 9 INJECTION, SOLUTION INTRAVENOUS at 08:04

## 2021-04-20 RX ADMIN — INSULIN DETEMIR 5 UNITS: 100 INJECTION, SOLUTION SUBCUTANEOUS at 10:04

## 2021-04-20 RX ADMIN — INSULIN ASPART 4 UNITS: 100 INJECTION, SOLUTION INTRAVENOUS; SUBCUTANEOUS at 12:04

## 2021-04-20 RX ADMIN — HYDROCODONE BITARTRATE AND ACETAMINOPHEN 1 TABLET: 5; 325 TABLET ORAL at 08:04

## 2021-04-20 RX ADMIN — PANTOPRAZOLE SODIUM 40 MG: 40 TABLET, DELAYED RELEASE ORAL at 08:04

## 2021-04-21 PROBLEM — E83.42 HYPOMAGNESEMIA: Status: ACTIVE | Noted: 2021-04-21

## 2021-04-21 LAB
ANION GAP SERPL CALCULATED.3IONS-SCNC: 14 MMOL/L (ref 7–16)
BASOPHILS # BLD AUTO: 0.02 K/UL (ref 0–0.2)
BASOPHILS NFR BLD AUTO: 0.2 % (ref 0–1)
BUN SERPL-MCNC: 45 MG/DL (ref 7–18)
BUN/CREAT SERPL: 32 (ref 6–20)
CALCIUM SERPL-MCNC: 7.8 MG/DL (ref 8.5–10.1)
CHLORIDE SERPL-SCNC: 106 MMOL/L (ref 98–107)
CO2 SERPL-SCNC: 21 MMOL/L (ref 21–32)
CREAT SERPL-MCNC: 1.39 MG/DL (ref 0.55–1.02)
DIFFERENTIAL METHOD BLD: ABNORMAL
EOSINOPHIL # BLD AUTO: 0.14 K/UL (ref 0–0.5)
EOSINOPHIL NFR BLD AUTO: 1.7 % (ref 1–4)
ERYTHROCYTE [DISTWIDTH] IN BLOOD BY AUTOMATED COUNT: 18.3 % (ref 11.5–14.5)
GLUCOSE SERPL-MCNC: 186 MG/DL (ref 70–105)
GLUCOSE SERPL-MCNC: 187 MG/DL (ref 74–106)
GLUCOSE SERPL-MCNC: 197 MG/DL (ref 70–105)
GLUCOSE SERPL-MCNC: 227 MG/DL (ref 70–105)
HCT VFR BLD AUTO: 22.9 % (ref 38–47)
HGB BLD-MCNC: 7.5 G/DL (ref 12–16)
IMM GRANULOCYTES # BLD AUTO: 0.15 K/UL (ref 0–0.04)
IMM GRANULOCYTES NFR BLD: 1.9 % (ref 0–0.4)
LYMPHOCYTES # BLD AUTO: 2.13 K/UL (ref 1–4.8)
LYMPHOCYTES NFR BLD AUTO: 26.6 % (ref 27–41)
MAGNESIUM SERPL-MCNC: 1 MG/DL (ref 1.7–2.3)
MCH RBC QN AUTO: 26.8 PG (ref 27–31)
MCHC RBC AUTO-ENTMCNC: 32.8 G/DL (ref 32–36)
MCV RBC AUTO: 81.8 FL (ref 80–96)
MONOCYTES # BLD AUTO: 0.88 K/UL (ref 0–0.8)
MONOCYTES NFR BLD AUTO: 11 % (ref 2–6)
MPC BLD CALC-MCNC: 10.1 FL (ref 9.4–12.4)
NEUTROPHILS # BLD AUTO: 4.7 K/UL (ref 1.8–7.7)
NEUTROPHILS NFR BLD AUTO: 58.6 % (ref 53–65)
NRBC # BLD AUTO: 0 X10E3/UL
NRBC, AUTO (.00): 0 %
PLATELET # BLD AUTO: 336 K/UL (ref 150–400)
POTASSIUM SERPL-SCNC: 4.1 MMOL/L (ref 3.5–5.1)
RBC # BLD AUTO: 2.8 M/UL (ref 4.2–5.4)
SODIUM SERPL-SCNC: 137 MMOL/L (ref 136–145)
WBC # BLD AUTO: 8.02 K/UL (ref 4.5–11)

## 2021-04-21 PROCEDURE — 97535 SELF CARE MNGMENT TRAINING: CPT

## 2021-04-21 PROCEDURE — 94760 N-INVAS EAR/PLS OXIMETRY 1: CPT

## 2021-04-21 PROCEDURE — C9399 UNCLASSIFIED DRUGS OR BIOLOG: HCPCS | Performed by: FAMILY MEDICINE

## 2021-04-21 PROCEDURE — 63600175 PHARM REV CODE 636 W HCPCS: Performed by: STUDENT IN AN ORGANIZED HEALTH CARE EDUCATION/TRAINING PROGRAM

## 2021-04-21 PROCEDURE — 25000003 PHARM REV CODE 250: Performed by: FAMILY MEDICINE

## 2021-04-21 PROCEDURE — 82962 GLUCOSE BLOOD TEST: CPT

## 2021-04-21 PROCEDURE — 25000003 PHARM REV CODE 250: Performed by: INTERNAL MEDICINE

## 2021-04-21 PROCEDURE — 83735 ASSAY OF MAGNESIUM: CPT | Performed by: FAMILY MEDICINE

## 2021-04-21 PROCEDURE — 63600175 PHARM REV CODE 636 W HCPCS: Performed by: SURGERY

## 2021-04-21 PROCEDURE — 94761 N-INVAS EAR/PLS OXIMETRY MLT: CPT

## 2021-04-21 PROCEDURE — 97116 GAIT TRAINING THERAPY: CPT

## 2021-04-21 PROCEDURE — 25000003 PHARM REV CODE 250: Performed by: STUDENT IN AN ORGANIZED HEALTH CARE EDUCATION/TRAINING PROGRAM

## 2021-04-21 PROCEDURE — 11000001 HC ACUTE MED/SURG PRIVATE ROOM

## 2021-04-21 PROCEDURE — 85025 COMPLETE CBC W/AUTO DIFF WBC: CPT | Performed by: FAMILY MEDICINE

## 2021-04-21 PROCEDURE — 63600175 PHARM REV CODE 636 W HCPCS: Performed by: FAMILY MEDICINE

## 2021-04-21 PROCEDURE — 80048 BASIC METABOLIC PNL TOTAL CA: CPT | Performed by: FAMILY MEDICINE

## 2021-04-21 PROCEDURE — 63600175 PHARM REV CODE 636 W HCPCS: Performed by: INTERNAL MEDICINE

## 2021-04-21 RX ORDER — MAGNESIUM SULFATE HEPTAHYDRATE 40 MG/ML
2 INJECTION, SOLUTION INTRAVENOUS ONCE
Status: COMPLETED | OUTPATIENT
Start: 2021-04-21 | End: 2021-04-21

## 2021-04-21 RX ORDER — SODIUM CHLORIDE 9 MG/ML
INJECTION, SOLUTION INTRAVENOUS CONTINUOUS
Status: DISCONTINUED | OUTPATIENT
Start: 2021-04-21 | End: 2021-04-22

## 2021-04-21 RX ADMIN — CHOLESTYRAMINE 4 G: 4 POWDER, FOR SUSPENSION ORAL at 09:04

## 2021-04-21 RX ADMIN — INSULIN DETEMIR 7 UNITS: 100 INJECTION, SOLUTION SUBCUTANEOUS at 09:04

## 2021-04-21 RX ADMIN — LOPERAMIDE HYDROCHLORIDE 2 MG: 2 CAPSULE ORAL at 09:04

## 2021-04-21 RX ADMIN — GABAPENTIN 200 MG: 100 CAPSULE ORAL at 09:04

## 2021-04-21 RX ADMIN — SODIUM CHLORIDE: 9 INJECTION, SOLUTION INTRAVENOUS at 09:04

## 2021-04-21 RX ADMIN — MORPHINE SULFATE 2 MG: 2 INJECTION, SOLUTION INTRAMUSCULAR; INTRAVENOUS at 06:04

## 2021-04-21 RX ADMIN — HYDROCODONE BITARTRATE AND ACETAMINOPHEN 1 TABLET: 5; 325 TABLET ORAL at 09:04

## 2021-04-21 RX ADMIN — ATORVASTATIN CALCIUM 20 MG: 20 TABLET, FILM COATED ORAL at 09:04

## 2021-04-21 RX ADMIN — INSULIN ASPART 2 UNITS: 100 INJECTION, SOLUTION INTRAVENOUS; SUBCUTANEOUS at 09:04

## 2021-04-21 RX ADMIN — MAGNESIUM SULFATE IN WATER 2 G: 40 INJECTION, SOLUTION INTRAVENOUS at 01:04

## 2021-04-21 RX ADMIN — MAGNESIUM SULFATE IN WATER 2 G: 40 INJECTION, SOLUTION INTRAVENOUS at 09:04

## 2021-04-21 RX ADMIN — ENOXAPARIN SODIUM 40 MG: 40 INJECTION SUBCUTANEOUS at 04:04

## 2021-04-21 RX ADMIN — INSULIN ASPART 2 UNITS: 100 INJECTION, SOLUTION INTRAVENOUS; SUBCUTANEOUS at 06:04

## 2021-04-22 PROBLEM — K59.00 CONSTIPATION: Status: ACTIVE | Noted: 2021-04-22

## 2021-04-22 PROBLEM — D64.9 ANEMIA: Status: ACTIVE | Noted: 2021-04-22

## 2021-04-22 LAB
ANION GAP SERPL CALCULATED.3IONS-SCNC: 12 MMOL/L (ref 7–16)
BASOPHILS # BLD AUTO: 0.01 K/UL (ref 0–0.2)
BASOPHILS NFR BLD AUTO: 0.1 % (ref 0–1)
BUN SERPL-MCNC: 42 MG/DL (ref 7–18)
BUN/CREAT SERPL: 34 (ref 6–20)
CALCIUM SERPL-MCNC: 7.8 MG/DL (ref 8.5–10.1)
CHLORIDE SERPL-SCNC: 108 MMOL/L (ref 98–107)
CO2 SERPL-SCNC: 23 MMOL/L (ref 21–32)
CREAT SERPL-MCNC: 1.24 MG/DL (ref 0.55–1.02)
DIFFERENTIAL METHOD BLD: ABNORMAL
EOSINOPHIL # BLD AUTO: 0.12 K/UL (ref 0–0.5)
EOSINOPHIL NFR BLD AUTO: 1.6 % (ref 1–4)
ERYTHROCYTE [DISTWIDTH] IN BLOOD BY AUTOMATED COUNT: 18.1 % (ref 11.5–14.5)
GLUCOSE SERPL-MCNC: 157 MG/DL (ref 74–106)
GLUCOSE SERPL-MCNC: 210 MG/DL (ref 70–105)
GLUCOSE SERPL-MCNC: 212 MG/DL (ref 70–105)
GLUCOSE SERPL-MCNC: 220 MG/DL (ref 70–105)
GLUCOSE SERPL-MCNC: 244 MG/DL (ref 70–105)
GLUCOSE SERPL-MCNC: 316 MG/DL (ref 70–105)
HCT VFR BLD AUTO: 22.4 % (ref 38–47)
HCT VFR BLD AUTO: 24.3 % (ref 38–47)
HGB BLD-MCNC: 7 G/DL (ref 12–16)
HGB BLD-MCNC: 7.6 G/DL (ref 12–16)
IMM GRANULOCYTES # BLD AUTO: 0.11 K/UL (ref 0–0.04)
IMM GRANULOCYTES NFR BLD: 1.5 % (ref 0–0.4)
LYMPHOCYTES # BLD AUTO: 1.97 K/UL (ref 1–4.8)
LYMPHOCYTES NFR BLD AUTO: 26.1 % (ref 27–41)
MAGNESIUM SERPL-MCNC: 1.8 MG/DL (ref 1.7–2.3)
MCH RBC QN AUTO: 26.6 PG (ref 27–31)
MCHC RBC AUTO-ENTMCNC: 31.3 G/DL (ref 32–36)
MCV RBC AUTO: 85.2 FL (ref 80–96)
MONOCYTES # BLD AUTO: 0.87 K/UL (ref 0–0.8)
MONOCYTES NFR BLD AUTO: 11.5 % (ref 2–6)
MPC BLD CALC-MCNC: 10.3 FL (ref 9.4–12.4)
NEUTROPHILS # BLD AUTO: 4.46 K/UL (ref 1.8–7.7)
NEUTROPHILS NFR BLD AUTO: 59.2 % (ref 53–65)
NRBC # BLD AUTO: 0 X10E3/UL
NRBC, AUTO (.00): 0 %
PLATELET # BLD AUTO: 389 K/UL (ref 150–400)
POTASSIUM SERPL-SCNC: 4.6 MMOL/L (ref 3.5–5.1)
RBC # BLD AUTO: 2.63 M/UL (ref 4.2–5.4)
SODIUM SERPL-SCNC: 138 MMOL/L (ref 136–145)
WBC # BLD AUTO: 7.54 K/UL (ref 4.5–11)

## 2021-04-22 PROCEDURE — 85014 HEMATOCRIT: CPT | Performed by: FAMILY MEDICINE

## 2021-04-22 PROCEDURE — 36415 COLL VENOUS BLD VENIPUNCTURE: CPT | Performed by: FAMILY MEDICINE

## 2021-04-22 PROCEDURE — 97110 THERAPEUTIC EXERCISES: CPT

## 2021-04-22 PROCEDURE — 25000003 PHARM REV CODE 250: Performed by: FAMILY MEDICINE

## 2021-04-22 PROCEDURE — 97116 GAIT TRAINING THERAPY: CPT

## 2021-04-22 PROCEDURE — 63600175 PHARM REV CODE 636 W HCPCS: Performed by: STUDENT IN AN ORGANIZED HEALTH CARE EDUCATION/TRAINING PROGRAM

## 2021-04-22 PROCEDURE — 85018 HEMOGLOBIN: CPT | Performed by: FAMILY MEDICINE

## 2021-04-22 PROCEDURE — 80048 BASIC METABOLIC PNL TOTAL CA: CPT | Performed by: FAMILY MEDICINE

## 2021-04-22 PROCEDURE — 25000003 PHARM REV CODE 250: Performed by: STUDENT IN AN ORGANIZED HEALTH CARE EDUCATION/TRAINING PROGRAM

## 2021-04-22 PROCEDURE — 83735 ASSAY OF MAGNESIUM: CPT | Performed by: FAMILY MEDICINE

## 2021-04-22 PROCEDURE — 11000001 HC ACUTE MED/SURG PRIVATE ROOM

## 2021-04-22 PROCEDURE — 94761 N-INVAS EAR/PLS OXIMETRY MLT: CPT

## 2021-04-22 PROCEDURE — 25000003 PHARM REV CODE 250: Performed by: INTERNAL MEDICINE

## 2021-04-22 PROCEDURE — C9399 UNCLASSIFIED DRUGS OR BIOLOG: HCPCS | Performed by: FAMILY MEDICINE

## 2021-04-22 PROCEDURE — 85025 COMPLETE CBC W/AUTO DIFF WBC: CPT | Performed by: FAMILY MEDICINE

## 2021-04-22 PROCEDURE — 63600175 PHARM REV CODE 636 W HCPCS: Performed by: FAMILY MEDICINE

## 2021-04-22 PROCEDURE — 82962 GLUCOSE BLOOD TEST: CPT

## 2021-04-22 RX ORDER — DOCUSATE SODIUM 100 MG/1
100 CAPSULE, LIQUID FILLED ORAL DAILY
Status: DISCONTINUED | OUTPATIENT
Start: 2021-04-22 | End: 2021-04-24 | Stop reason: HOSPADM

## 2021-04-22 RX ORDER — CHOLESTYRAMINE 4 G/4.8G
4 POWDER, FOR SUSPENSION ORAL 2 TIMES DAILY
Status: DISCONTINUED | OUTPATIENT
Start: 2021-04-22 | End: 2021-04-23

## 2021-04-22 RX ADMIN — INSULIN ASPART 4 UNITS: 100 INJECTION, SOLUTION INTRAVENOUS; SUBCUTANEOUS at 05:04

## 2021-04-22 RX ADMIN — ATORVASTATIN CALCIUM 20 MG: 20 TABLET, FILM COATED ORAL at 09:04

## 2021-04-22 RX ADMIN — CHOLESTYRAMINE 4 G: 4 POWDER, FOR SUSPENSION ORAL at 09:04

## 2021-04-22 RX ADMIN — INSULIN ASPART 4 UNITS: 100 INJECTION, SOLUTION INTRAVENOUS; SUBCUTANEOUS at 12:04

## 2021-04-22 RX ADMIN — SODIUM CHLORIDE: 9 INJECTION, SOLUTION INTRAVENOUS at 09:04

## 2021-04-22 RX ADMIN — AMLODIPINE BESYLATE 5 MG: 5 TABLET ORAL at 09:04

## 2021-04-22 RX ADMIN — HYDROCODONE BITARTRATE AND ACETAMINOPHEN 1 TABLET: 5; 325 TABLET ORAL at 09:04

## 2021-04-22 RX ADMIN — INSULIN ASPART 4 UNITS: 100 INJECTION, SOLUTION INTRAVENOUS; SUBCUTANEOUS at 09:04

## 2021-04-22 RX ADMIN — DOCUSATE SODIUM 100 MG: 100 CAPSULE, LIQUID FILLED ORAL at 12:04

## 2021-04-22 RX ADMIN — INSULIN DETEMIR 8 UNITS: 100 INJECTION, SOLUTION SUBCUTANEOUS at 09:04

## 2021-04-22 RX ADMIN — LOPERAMIDE HYDROCHLORIDE 2 MG: 2 CAPSULE ORAL at 09:04

## 2021-04-22 RX ADMIN — GABAPENTIN 200 MG: 100 CAPSULE ORAL at 09:04

## 2021-04-23 PROBLEM — K59.00 CONSTIPATION: Status: RESOLVED | Noted: 2021-04-22 | Resolved: 2021-04-23

## 2021-04-23 LAB
ANION GAP SERPL CALCULATED.3IONS-SCNC: 15 MMOL/L (ref 7–16)
BASOPHILS # BLD AUTO: 0.02 K/UL (ref 0–0.2)
BASOPHILS NFR BLD AUTO: 0.2 % (ref 0–1)
BUN SERPL-MCNC: 35 MG/DL (ref 7–18)
BUN/CREAT SERPL: 23 (ref 6–20)
CALCIUM SERPL-MCNC: 7.6 MG/DL (ref 8.5–10.1)
CHLORIDE SERPL-SCNC: 105 MMOL/L (ref 98–107)
CO2 SERPL-SCNC: 22 MMOL/L (ref 21–32)
CREAT SERPL-MCNC: 1.5 MG/DL (ref 0.55–1.02)
DIFFERENTIAL METHOD BLD: ABNORMAL
EOSINOPHIL # BLD AUTO: 0.09 K/UL (ref 0–0.5)
EOSINOPHIL NFR BLD AUTO: 1.1 % (ref 1–4)
ERYTHROCYTE [DISTWIDTH] IN BLOOD BY AUTOMATED COUNT: 17.9 % (ref 11.5–14.5)
GLUCOSE SERPL-MCNC: 213 MG/DL (ref 74–106)
GLUCOSE SERPL-MCNC: 225 MG/DL (ref 70–105)
GLUCOSE SERPL-MCNC: 237 MG/DL (ref 70–105)
GLUCOSE SERPL-MCNC: 247 MG/DL (ref 70–105)
HCT VFR BLD AUTO: 22 % (ref 38–47)
HCT VFR BLD AUTO: 24.2 % (ref 38–47)
HGB BLD-MCNC: 7 G/DL (ref 12–16)
HGB BLD-MCNC: 7.4 G/DL (ref 12–16)
IMM GRANULOCYTES # BLD AUTO: 0.13 K/UL (ref 0–0.04)
IMM GRANULOCYTES NFR BLD: 1.5 % (ref 0–0.4)
LYMPHOCYTES # BLD AUTO: 1.93 K/UL (ref 1–4.8)
LYMPHOCYTES NFR BLD AUTO: 22.6 % (ref 27–41)
MAGNESIUM SERPL-MCNC: 1.2 MG/DL (ref 1.7–2.3)
MCH RBC QN AUTO: 26.4 PG (ref 27–31)
MCHC RBC AUTO-ENTMCNC: 31.8 G/DL (ref 32–36)
MCV RBC AUTO: 83 FL (ref 80–96)
MONOCYTES # BLD AUTO: 0.87 K/UL (ref 0–0.8)
MONOCYTES NFR BLD AUTO: 10.2 % (ref 2–6)
MPC BLD CALC-MCNC: 9.7 FL (ref 9.4–12.4)
NEUTROPHILS # BLD AUTO: 5.49 K/UL (ref 1.8–7.7)
NEUTROPHILS NFR BLD AUTO: 64.4 % (ref 53–65)
NRBC # BLD AUTO: 0 X10E3/UL
NRBC, AUTO (.00): 0 %
OCCULT BLOOD: NEGATIVE
PLATELET # BLD AUTO: 349 K/UL (ref 150–400)
POTASSIUM SERPL-SCNC: 4.9 MMOL/L (ref 3.5–5.1)
RBC # BLD AUTO: 2.65 M/UL (ref 4.2–5.4)
SODIUM SERPL-SCNC: 137 MMOL/L (ref 136–145)
WBC # BLD AUTO: 8.53 K/UL (ref 4.5–11)

## 2021-04-23 PROCEDURE — 85014 HEMATOCRIT: CPT | Performed by: FAMILY MEDICINE

## 2021-04-23 PROCEDURE — 97116 GAIT TRAINING THERAPY: CPT | Mod: CQ

## 2021-04-23 PROCEDURE — 97110 THERAPEUTIC EXERCISES: CPT | Mod: CQ

## 2021-04-23 PROCEDURE — 63600175 PHARM REV CODE 636 W HCPCS: Performed by: STUDENT IN AN ORGANIZED HEALTH CARE EDUCATION/TRAINING PROGRAM

## 2021-04-23 PROCEDURE — 25000003 PHARM REV CODE 250: Performed by: INTERNAL MEDICINE

## 2021-04-23 PROCEDURE — 83735 ASSAY OF MAGNESIUM: CPT | Performed by: FAMILY MEDICINE

## 2021-04-23 PROCEDURE — 36415 COLL VENOUS BLD VENIPUNCTURE: CPT | Performed by: FAMILY MEDICINE

## 2021-04-23 PROCEDURE — 94761 N-INVAS EAR/PLS OXIMETRY MLT: CPT

## 2021-04-23 PROCEDURE — 82271 OCCULT BLOOD OTHER SOURCES: CPT | Performed by: FAMILY MEDICINE

## 2021-04-23 PROCEDURE — 80048 BASIC METABOLIC PNL TOTAL CA: CPT | Performed by: FAMILY MEDICINE

## 2021-04-23 PROCEDURE — 63600175 PHARM REV CODE 636 W HCPCS: Performed by: SURGERY

## 2021-04-23 PROCEDURE — 11000001 HC ACUTE MED/SURG PRIVATE ROOM

## 2021-04-23 PROCEDURE — C9399 UNCLASSIFIED DRUGS OR BIOLOG: HCPCS | Performed by: FAMILY MEDICINE

## 2021-04-23 PROCEDURE — 25000003 PHARM REV CODE 250: Performed by: FAMILY MEDICINE

## 2021-04-23 PROCEDURE — 85025 COMPLETE CBC W/AUTO DIFF WBC: CPT | Performed by: FAMILY MEDICINE

## 2021-04-23 PROCEDURE — 63600175 PHARM REV CODE 636 W HCPCS: Performed by: FAMILY MEDICINE

## 2021-04-23 PROCEDURE — 82962 GLUCOSE BLOOD TEST: CPT

## 2021-04-23 PROCEDURE — 85018 HEMOGLOBIN: CPT | Performed by: FAMILY MEDICINE

## 2021-04-23 RX ORDER — MAGNESIUM SULFATE HEPTAHYDRATE 40 MG/ML
4 INJECTION, SOLUTION INTRAVENOUS ONCE
Status: COMPLETED | OUTPATIENT
Start: 2021-04-23 | End: 2021-04-23

## 2021-04-23 RX ADMIN — ATORVASTATIN CALCIUM 20 MG: 20 TABLET, FILM COATED ORAL at 09:04

## 2021-04-23 RX ADMIN — ENOXAPARIN SODIUM 40 MG: 40 INJECTION SUBCUTANEOUS at 04:04

## 2021-04-23 RX ADMIN — MAGNESIUM SULFATE IN WATER 4 G: 40 INJECTION, SOLUTION INTRAVENOUS at 10:04

## 2021-04-23 RX ADMIN — HYDROCODONE BITARTRATE AND ACETAMINOPHEN 1 TABLET: 5; 325 TABLET ORAL at 01:04

## 2021-04-23 RX ADMIN — GABAPENTIN 200 MG: 100 CAPSULE ORAL at 10:04

## 2021-04-23 RX ADMIN — HYDROCODONE BITARTRATE AND ACETAMINOPHEN 1 TABLET: 5; 325 TABLET ORAL at 09:04

## 2021-04-23 RX ADMIN — INSULIN DETEMIR 8 UNITS: 100 INJECTION, SOLUTION SUBCUTANEOUS at 09:04

## 2021-04-23 RX ADMIN — DOCUSATE SODIUM 100 MG: 100 CAPSULE, LIQUID FILLED ORAL at 10:04

## 2021-04-23 RX ADMIN — INSULIN ASPART 4 UNITS: 100 INJECTION, SOLUTION INTRAVENOUS; SUBCUTANEOUS at 06:04

## 2021-04-23 RX ADMIN — GABAPENTIN 200 MG: 100 CAPSULE ORAL at 09:04

## 2021-04-23 RX ADMIN — INSULIN ASPART 2 UNITS: 100 INJECTION, SOLUTION INTRAVENOUS; SUBCUTANEOUS at 09:04

## 2021-04-23 RX ADMIN — AMLODIPINE BESYLATE 5 MG: 5 TABLET ORAL at 10:04

## 2021-04-23 RX ADMIN — INSULIN ASPART 4 UNITS: 100 INJECTION, SOLUTION INTRAVENOUS; SUBCUTANEOUS at 02:04

## 2021-04-24 VITALS
HEIGHT: 68 IN | RESPIRATION RATE: 18 BRPM | SYSTOLIC BLOOD PRESSURE: 130 MMHG | WEIGHT: 217.38 LBS | DIASTOLIC BLOOD PRESSURE: 77 MMHG | OXYGEN SATURATION: 98 % | BODY MASS INDEX: 32.94 KG/M2 | HEART RATE: 99 BPM | TEMPERATURE: 98 F

## 2021-04-24 PROBLEM — N17.9 ACUTE KIDNEY INJURY: Status: RESOLVED | Noted: 2021-04-11 | Resolved: 2021-04-24

## 2021-04-24 PROBLEM — R07.2 PRECORDIAL PAIN: Status: RESOLVED | Noted: 2021-04-12 | Resolved: 2021-04-24

## 2021-04-24 PROBLEM — E83.42 HYPOMAGNESEMIA: Status: RESOLVED | Noted: 2021-04-21 | Resolved: 2021-04-24

## 2021-04-24 PROBLEM — D64.9 ANEMIA: Status: RESOLVED | Noted: 2021-04-22 | Resolved: 2021-04-24

## 2021-04-24 LAB
ANION GAP SERPL CALCULATED.3IONS-SCNC: 15 MMOL/L (ref 7–16)
BASOPHILS # BLD AUTO: 0.02 K/UL (ref 0–0.2)
BASOPHILS NFR BLD AUTO: 0.2 % (ref 0–1)
BUN SERPL-MCNC: 34 MG/DL (ref 7–18)
BUN/CREAT SERPL: 25 (ref 6–20)
CALCIUM SERPL-MCNC: 8.1 MG/DL (ref 8.5–10.1)
CHLORIDE SERPL-SCNC: 104 MMOL/L (ref 98–107)
CO2 SERPL-SCNC: 23 MMOL/L (ref 21–32)
CREAT SERPL-MCNC: 1.35 MG/DL (ref 0.55–1.02)
DIFFERENTIAL METHOD BLD: ABNORMAL
EOSINOPHIL # BLD AUTO: 0.09 K/UL (ref 0–0.5)
EOSINOPHIL NFR BLD AUTO: 0.9 % (ref 1–4)
ERYTHROCYTE [DISTWIDTH] IN BLOOD BY AUTOMATED COUNT: 17.8 % (ref 11.5–14.5)
GLUCOSE SERPL-MCNC: 202 MG/DL (ref 74–106)
GLUCOSE SERPL-MCNC: 205 MG/DL (ref 70–105)
HCT VFR BLD AUTO: 23.1 % (ref 38–47)
HGB BLD-MCNC: 7.2 G/DL (ref 12–16)
IMM GRANULOCYTES # BLD AUTO: 0.23 K/UL (ref 0–0.04)
IMM GRANULOCYTES NFR BLD: 2.4 % (ref 0–0.4)
LYMPHOCYTES # BLD AUTO: 2.27 K/UL (ref 1–4.8)
LYMPHOCYTES NFR BLD AUTO: 23.7 % (ref 27–41)
MAGNESIUM SERPL-MCNC: 1.9 MG/DL (ref 1.7–2.3)
MCH RBC QN AUTO: 26.6 PG (ref 27–31)
MCHC RBC AUTO-ENTMCNC: 31.2 G/DL (ref 32–36)
MCV RBC AUTO: 85.2 FL (ref 80–96)
MONOCYTES # BLD AUTO: 0.71 K/UL (ref 0–0.8)
MONOCYTES NFR BLD AUTO: 7.4 % (ref 2–6)
MPC BLD CALC-MCNC: 10.1 FL (ref 9.4–12.4)
NEUTROPHILS # BLD AUTO: 6.26 K/UL (ref 1.8–7.7)
NEUTROPHILS NFR BLD AUTO: 65.4 % (ref 53–65)
NRBC # BLD AUTO: 0 X10E3/UL
NRBC, AUTO (.00): 0 %
PLATELET # BLD AUTO: 411 K/UL (ref 150–400)
POTASSIUM SERPL-SCNC: 4.9 MMOL/L (ref 3.5–5.1)
RBC # BLD AUTO: 2.71 M/UL (ref 4.2–5.4)
SODIUM SERPL-SCNC: 137 MMOL/L (ref 136–145)
WBC # BLD AUTO: 9.58 K/UL (ref 4.5–11)

## 2021-04-24 PROCEDURE — 85025 COMPLETE CBC W/AUTO DIFF WBC: CPT | Performed by: FAMILY MEDICINE

## 2021-04-24 PROCEDURE — 36415 COLL VENOUS BLD VENIPUNCTURE: CPT | Performed by: FAMILY MEDICINE

## 2021-04-24 PROCEDURE — 82962 GLUCOSE BLOOD TEST: CPT

## 2021-04-24 PROCEDURE — 25000003 PHARM REV CODE 250: Performed by: INTERNAL MEDICINE

## 2021-04-24 PROCEDURE — 80048 BASIC METABOLIC PNL TOTAL CA: CPT | Performed by: FAMILY MEDICINE

## 2021-04-24 PROCEDURE — 25000003 PHARM REV CODE 250: Performed by: FAMILY MEDICINE

## 2021-04-24 PROCEDURE — 63600175 PHARM REV CODE 636 W HCPCS: Performed by: STUDENT IN AN ORGANIZED HEALTH CARE EDUCATION/TRAINING PROGRAM

## 2021-04-24 PROCEDURE — 83735 ASSAY OF MAGNESIUM: CPT | Performed by: FAMILY MEDICINE

## 2021-04-24 RX ORDER — FERROUS SULFATE 325(65) MG
325 TABLET, DELAYED RELEASE (ENTERIC COATED) ORAL DAILY
Qty: 30 TABLET | Refills: 0 | Status: SHIPPED | OUTPATIENT
Start: 2021-04-24 | End: 2023-01-12

## 2021-04-24 RX ORDER — INSULIN ASPART 100 [IU]/ML
3 INJECTION, SOLUTION INTRAVENOUS; SUBCUTANEOUS
Qty: 32.4 ML | Refills: 0 | Status: SHIPPED | OUTPATIENT
Start: 2021-04-24 | End: 2023-01-12

## 2021-04-24 RX ADMIN — INSULIN ASPART 4 UNITS: 100 INJECTION, SOLUTION INTRAVENOUS; SUBCUTANEOUS at 12:04

## 2021-04-24 RX ADMIN — HYDROCODONE BITARTRATE AND ACETAMINOPHEN 1 TABLET: 5; 325 TABLET ORAL at 05:04

## 2021-04-24 RX ADMIN — GABAPENTIN 200 MG: 100 CAPSULE ORAL at 10:04

## 2021-04-24 RX ADMIN — DOCUSATE SODIUM 100 MG: 100 CAPSULE, LIQUID FILLED ORAL at 10:04

## 2021-04-24 RX ADMIN — INSULIN ASPART 4 UNITS: 100 INJECTION, SOLUTION INTRAVENOUS; SUBCUTANEOUS at 05:04

## 2021-04-24 RX ADMIN — AMLODIPINE BESYLATE 5 MG: 5 TABLET ORAL at 10:04

## 2021-05-03 ENCOUNTER — OFFICE VISIT (OUTPATIENT)
Dept: SURGERY | Facility: CLINIC | Age: 36
End: 2021-05-03
Payer: MEDICAID

## 2021-05-03 VITALS — WEIGHT: 232 LBS | BODY MASS INDEX: 35.16 KG/M2 | HEIGHT: 68 IN

## 2021-05-03 DIAGNOSIS — S31.000D WOUND OF SACRAL REGION, SUBSEQUENT ENCOUNTER: Primary | ICD-10-CM

## 2021-05-03 PROCEDURE — 99213 OFFICE O/P EST LOW 20 MIN: CPT | Mod: PBBFAC | Performed by: STUDENT IN AN ORGANIZED HEALTH CARE EDUCATION/TRAINING PROGRAM

## 2021-05-03 PROCEDURE — 99024 PR POST-OP FOLLOW-UP VISIT: ICD-10-PCS | Mod: ,,, | Performed by: STUDENT IN AN ORGANIZED HEALTH CARE EDUCATION/TRAINING PROGRAM

## 2021-05-03 PROCEDURE — 99024 POSTOP FOLLOW-UP VISIT: CPT | Mod: ,,, | Performed by: STUDENT IN AN ORGANIZED HEALTH CARE EDUCATION/TRAINING PROGRAM

## 2021-05-03 PROCEDURE — 99213 OFFICE O/P EST LOW 20 MIN: CPT | Mod: PBBFAC,,, | Performed by: STUDENT IN AN ORGANIZED HEALTH CARE EDUCATION/TRAINING PROGRAM

## 2021-05-03 PROCEDURE — 99213 HC OFFICE/OUTPT VISIT, EST, LEVL III, 20-29 MIN: ICD-10-PCS | Mod: PBBFAC,,, | Performed by: STUDENT IN AN ORGANIZED HEALTH CARE EDUCATION/TRAINING PROGRAM

## 2021-05-18 ENCOUNTER — OFFICE VISIT (OUTPATIENT)
Dept: SURGERY | Facility: CLINIC | Age: 36
End: 2021-05-18
Payer: MEDICAID

## 2021-05-18 VITALS — HEIGHT: 68 IN | WEIGHT: 230 LBS | BODY MASS INDEX: 34.86 KG/M2

## 2021-05-18 DIAGNOSIS — Z93.2 ILEOSTOMY STATUS: Primary | ICD-10-CM

## 2021-05-18 PROCEDURE — 99024 PR POST-OP FOLLOW-UP VISIT: ICD-10-PCS | Mod: S$PBB,,, | Performed by: STUDENT IN AN ORGANIZED HEALTH CARE EDUCATION/TRAINING PROGRAM

## 2021-05-18 PROCEDURE — 99024 POSTOP FOLLOW-UP VISIT: CPT | Mod: S$PBB,,, | Performed by: STUDENT IN AN ORGANIZED HEALTH CARE EDUCATION/TRAINING PROGRAM

## 2021-05-18 PROCEDURE — 99213 OFFICE O/P EST LOW 20 MIN: CPT | Mod: PBBFAC | Performed by: STUDENT IN AN ORGANIZED HEALTH CARE EDUCATION/TRAINING PROGRAM

## 2022-05-25 ENCOUNTER — HOSPITAL ENCOUNTER (EMERGENCY)
Facility: HOSPITAL | Age: 37
Discharge: HOME | End: 2022-05-25
Attending: EMERGENCY MEDICINE
Payer: MEDICAID

## 2022-05-25 VITALS
SYSTOLIC BLOOD PRESSURE: 177 MMHG | HEIGHT: 67 IN | WEIGHT: 250 LBS | DIASTOLIC BLOOD PRESSURE: 94 MMHG | HEART RATE: 102 BPM | RESPIRATION RATE: 20 BRPM | BODY MASS INDEX: 39.24 KG/M2 | TEMPERATURE: 99 F | OXYGEN SATURATION: 100 %

## 2022-05-25 DIAGNOSIS — D50.9 MICROCYTIC ANEMIA: ICD-10-CM

## 2022-05-25 DIAGNOSIS — L02.91 ABSCESS: Primary | ICD-10-CM

## 2022-05-25 DIAGNOSIS — R42 DIZZY: ICD-10-CM

## 2022-05-25 DIAGNOSIS — R00.2 PALPITATION: ICD-10-CM

## 2022-05-25 LAB
ALBUMIN SERPL BCP-MCNC: 2.7 G/DL (ref 3.5–5)
ALBUMIN/GLOB SERPL: 0.6 {RATIO}
ALP SERPL-CCNC: 106 U/L (ref 37–98)
ALT SERPL W P-5'-P-CCNC: 10 U/L (ref 13–56)
AMPHET UR QL SCN: NEGATIVE
ANION GAP SERPL CALCULATED.3IONS-SCNC: 14 MMOL/L (ref 7–16)
ANISOCYTOSIS BLD QL SMEAR: ABNORMAL
AST SERPL W P-5'-P-CCNC: 7 U/L (ref 15–37)
BACTERIA #/AREA URNS HPF: ABNORMAL /HPF
BARBITURATES UR QL SCN: NEGATIVE
BASOPHILS # BLD AUTO: 0.03 K/UL (ref 0–0.2)
BASOPHILS NFR BLD AUTO: 0.3 % (ref 0–1)
BENZODIAZ METAB UR QL SCN: NEGATIVE
BILIRUB SERPL-MCNC: 0.2 MG/DL (ref 0–1.2)
BILIRUB UR QL STRIP: NEGATIVE
BUN SERPL-MCNC: 24 MG/DL (ref 7–18)
BUN/CREAT SERPL: 11 (ref 6–20)
CALCIUM SERPL-MCNC: 9.3 MG/DL (ref 8.5–10.1)
CANNABINOIDS UR QL SCN: NEGATIVE
CHLORIDE SERPL-SCNC: 99 MMOL/L (ref 98–107)
CLARITY UR: CLEAR
CO2 SERPL-SCNC: 21 MMOL/L (ref 21–32)
COCAINE UR QL SCN: NEGATIVE
COLOR UR: YELLOW
CREAT SERPL-MCNC: 2.21 MG/DL (ref 0.55–1.02)
DIFFERENTIAL METHOD BLD: ABNORMAL
EOSINOPHIL # BLD AUTO: 0.09 K/UL (ref 0–0.5)
EOSINOPHIL NFR BLD AUTO: 0.9 % (ref 1–4)
ERYTHROCYTE [DISTWIDTH] IN BLOOD BY AUTOMATED COUNT: 17.8 % (ref 11.5–14.5)
GLOBULIN SER-MCNC: 4.9 G/DL (ref 2–4)
GLUCOSE SERPL-MCNC: 498 MG/DL (ref 74–106)
GLUCOSE UR STRIP-MCNC: >=1000 MG/DL
HCO3 UR-SCNC: 22.6 MMOL/L (ref 24–28)
HCT VFR BLD AUTO: 21.3 % (ref 38–47)
HCT VFR BLD CALC: 21 % (ref 35–51)
HGB BLD-MCNC: 6 G/DL (ref 12–16)
HYPOCHROMIA BLD QL SMEAR: ABNORMAL
IMM GRANULOCYTES # BLD AUTO: 0.08 K/UL (ref 0–0.04)
IMM GRANULOCYTES NFR BLD: 0.8 % (ref 0–0.4)
KETONES UR STRIP-SCNC: NEGATIVE MG/DL
LDH SERPL L TO P-CCNC: 1.4 MMOL/L (ref 0.3–1.2)
LEUKOCYTE ESTERASE UR QL STRIP: NEGATIVE
LYMPHOCYTES # BLD AUTO: 1.41 K/UL (ref 1–4.8)
LYMPHOCYTES NFR BLD AUTO: 14.4 % (ref 27–41)
MCH RBC QN AUTO: 18.8 PG (ref 27–31)
MCHC RBC AUTO-ENTMCNC: 28.2 G/DL (ref 32–36)
MCV RBC AUTO: 66.8 FL (ref 80–96)
MICROCYTES BLD QL SMEAR: ABNORMAL
MONOCYTES # BLD AUTO: 0.51 K/UL (ref 0–0.8)
MONOCYTES NFR BLD AUTO: 5.2 % (ref 2–6)
MPC BLD CALC-MCNC: 10.5 FL (ref 9.4–12.4)
NEUTROPHILS # BLD AUTO: 7.68 K/UL (ref 1.8–7.7)
NEUTROPHILS NFR BLD AUTO: 78.4 % (ref 53–65)
NITRITE UR QL STRIP: NEGATIVE
NRBC # BLD AUTO: 0 X10E3/UL
NRBC, AUTO (.00): 0 %
OPIATES UR QL SCN: NEGATIVE
PCO2 BLDA: 41 MMHG (ref 41–51)
PCP UR QL SCN: NEGATIVE
PH SMN: 7.35 [PH] (ref 7.32–7.42)
PH UR STRIP: 6 PH UNITS
PLATELET # BLD AUTO: 560 K/UL (ref 150–400)
PLATELET MORPHOLOGY: ABNORMAL
PO2 BLDA: 15 MMHG (ref 25–40)
POC BASE EXCESS: -2.8 MMOL/L (ref -2–3)
POC CO2: 23.9 MMOL/L
POC IONIZED CALCIUM: 1.22 MMOL/L (ref 1.15–1.35)
POC SATURATED O2: 17 % (ref 40–70)
POCT GLUCOSE: 384 MG/DL (ref 60–95)
POTASSIUM BLD-SCNC: 4.6 MMOL/L (ref 3.4–4.5)
POTASSIUM SERPL-SCNC: 4.8 MMOL/L (ref 3.5–5.1)
PROT SERPL-MCNC: 7.6 G/DL (ref 6.4–8.2)
PROT UR QL STRIP: >=300
RBC # BLD AUTO: 3.19 M/UL (ref 4.2–5.4)
RBC # UR STRIP: ABNORMAL /UL
RBC #/AREA URNS HPF: ABNORMAL /HPF
SCHISTOCYTES BLD QL AUTO: ABNORMAL
SODIUM BLD-SCNC: 127 MMOL/L (ref 136–145)
SODIUM SERPL-SCNC: 129 MMOL/L (ref 136–145)
SP GR UR STRIP: 1.01
SQUAMOUS #/AREA URNS LPF: ABNORMAL /LPF
TRICHOMONAS #/AREA URNS HPF: ABNORMAL /HPF
UROBILINOGEN UR STRIP-ACNC: 0.2 MG/DL
WBC # BLD AUTO: 9.8 K/UL (ref 4.5–11)
WBC #/AREA URNS HPF: ABNORMAL /HPF

## 2022-05-25 PROCEDURE — 99285 EMERGENCY DEPT VISIT HI MDM: CPT | Mod: 25

## 2022-05-25 PROCEDURE — 81001 URINALYSIS AUTO W/SCOPE: CPT | Mod: 59 | Performed by: EMERGENCY MEDICINE

## 2022-05-25 PROCEDURE — 85025 COMPLETE CBC W/AUTO DIFF WBC: CPT | Performed by: EMERGENCY MEDICINE

## 2022-05-25 PROCEDURE — 82248 BILIRUBIN DIRECT: CPT

## 2022-05-25 PROCEDURE — 82330 ASSAY OF CALCIUM: CPT

## 2022-05-25 PROCEDURE — 80053 COMPREHEN METABOLIC PANEL: CPT | Performed by: EMERGENCY MEDICINE

## 2022-05-25 PROCEDURE — 93010 ELECTROCARDIOGRAM REPORT: CPT | Mod: ,,, | Performed by: INTERNAL MEDICINE

## 2022-05-25 PROCEDURE — 63600175 PHARM REV CODE 636 W HCPCS: Performed by: EMERGENCY MEDICINE

## 2022-05-25 PROCEDURE — 10061 PR DRAIN SKIN ABSCESS COMPLIC: ICD-10-PCS | Mod: ,,, | Performed by: EMERGENCY MEDICINE

## 2022-05-25 PROCEDURE — 99284 EMERGENCY DEPT VISIT MOD MDM: CPT | Mod: 25,,, | Performed by: EMERGENCY MEDICINE

## 2022-05-25 PROCEDURE — 84295 ASSAY OF SERUM SODIUM: CPT | Mod: 59

## 2022-05-25 PROCEDURE — 82803 BLOOD GASES ANY COMBINATION: CPT

## 2022-05-25 PROCEDURE — 85014 HEMATOCRIT: CPT

## 2022-05-25 PROCEDURE — 82947 ASSAY GLUCOSE BLOOD QUANT: CPT

## 2022-05-25 PROCEDURE — 99284 PR EMERGENCY DEPT VISIT,LEVEL IV: ICD-10-PCS | Mod: 25,,, | Performed by: EMERGENCY MEDICINE

## 2022-05-25 PROCEDURE — 93010 EKG 12-LEAD: ICD-10-PCS | Mod: ,,, | Performed by: INTERNAL MEDICINE

## 2022-05-25 PROCEDURE — 83605 ASSAY OF LACTIC ACID: CPT

## 2022-05-25 PROCEDURE — 96372 THER/PROPH/DIAG INJ SC/IM: CPT

## 2022-05-25 PROCEDURE — 84132 ASSAY OF SERUM POTASSIUM: CPT

## 2022-05-25 PROCEDURE — 10061 I&D ABSCESS COMP/MULTIPLE: CPT | Mod: ,,, | Performed by: EMERGENCY MEDICINE

## 2022-05-25 PROCEDURE — 80307 DRUG TEST PRSMV CHEM ANLYZR: CPT | Performed by: EMERGENCY MEDICINE

## 2022-05-25 PROCEDURE — 93005 ELECTROCARDIOGRAM TRACING: CPT

## 2022-05-25 PROCEDURE — 10060 I&D ABSCESS SIMPLE/SINGLE: CPT

## 2022-05-25 PROCEDURE — 36415 COLL VENOUS BLD VENIPUNCTURE: CPT | Performed by: EMERGENCY MEDICINE

## 2022-05-25 RX ORDER — AMOXICILLIN AND CLAVULANATE POTASSIUM 875; 125 MG/1; MG/1
1 TABLET, FILM COATED ORAL 2 TIMES DAILY
Qty: 14 TABLET | Refills: 0 | Status: SHIPPED | OUTPATIENT
Start: 2022-05-25 | End: 2023-01-12 | Stop reason: ALTCHOICE

## 2022-05-25 RX ORDER — FERROUS SULFATE 325(65) MG
325 TABLET, DELAYED RELEASE (ENTERIC COATED) ORAL DAILY
Qty: 30 TABLET | Refills: 0 | Status: SHIPPED | OUTPATIENT
Start: 2022-05-25 | End: 2023-01-12 | Stop reason: SDUPTHER

## 2022-05-25 RX ORDER — AMPICILLIN AND SULBACTAM 2; 1 G/1; G/1
3 INJECTION, POWDER, FOR SOLUTION INTRAMUSCULAR; INTRAVENOUS
Status: COMPLETED | OUTPATIENT
Start: 2022-05-25 | End: 2022-05-25

## 2022-05-25 RX ADMIN — AMPICILLIN SODIUM AND SULBACTAM SODIUM 3 G: 2; 1 INJECTION, POWDER, FOR SOLUTION INTRAMUSCULAR; INTRAVENOUS at 10:05

## 2022-05-25 NOTE — ED PROVIDER NOTES
Encounter Date: 5/25/2022    SCRIBE #1 NOTE: I, Olimpia You, am scribing for, and in the presence of,  Yuriy Ghosh MD. I have scribed the entire note.       History     Chief Complaint   Patient presents with    Dizziness    Abscess     The patient is a 36 year old female with complaints of weakness and an abscess. She reports for the last month she has had generalized weakness. She also reports upon standing she has heart palpitations, right sided chest pain, shortness of breath, and feels like she might lose consciousness. The patient reports multiple recurring abscesses on the buttock and genital area. She has had them removed previously. She reports an abscess on her back that has been there for 3 months and has been draining for 1 month. She denies any other issues.    The history is provided by the patient. No  was used.     Review of patient's allergies indicates:  No Known Allergies  Past Medical History:   Diagnosis Date    Acute cystitis 3/27/2021    Diabetes mellitus     type II    Diabetic ketoacidosis without coma associated with type 2 diabetes mellitus 3/13/2021    HTN (hypertension) 3/19/2021    Hyperkalemia 4/11/2021    Hypertension     Ketoacidosis due to type 2 diabetes mellitus     Morbid obesity     Symptomatic anemia      Past Surgical History:   Procedure Laterality Date    abcess of buttocks      CLOSURE OF WOUND Left 3/30/2021    Procedure: CLOSURE, WOUND;  Surgeon: Sebas Shea DO;  Location: [facility] OR;  Service: General;  Laterality: Left;    COLON SURGERY      ILEOSTOMY CLOSURE N/A 4/16/2021    Procedure: CLOSURE, ILEOSTOMY;  Surgeon: Sebas Shea DO;  Location: [facility] OR;  Service: General;  Laterality: N/A;    wound debribement       No family history on file.  Social History     Tobacco Use    Smoking status: Never Smoker    Smokeless tobacco: Never Used   Substance Use Topics    Alcohol use: Never    Drug use: Never     Review  of Systems   Constitutional: Negative for fever.   Respiratory: Positive for shortness of breath.    Cardiovascular: Positive for chest pain and palpitations.   Skin: Positive for wound (Abscess on the back).   Neurological: Positive for weakness.   All other systems reviewed and are negative.      Physical Exam     Initial Vitals [05/25/22 1832]   BP Pulse Resp Temp SpO2   (!) 157/100 103 20 98.5 °F (36.9 °C) 98 %      MAP       --         Physical Exam    Constitutional: She appears well-developed and well-nourished. She is Obese .   HENT:   Head: Normocephalic and atraumatic.   Eyes: Conjunctivae and EOM are normal. Pupils are equal, round, and reactive to light.   Neck: Neck supple.   Normal range of motion.  Cardiovascular: Normal rate and regular rhythm.   Pulmonary/Chest: Breath sounds normal. No respiratory distress.   Abdominal: Abdomen is soft. There is no abdominal tenderness.   Musculoskeletal:         General: Normal range of motion.      Cervical back: Normal range of motion and neck supple.     Neurological: She is alert and oriented to person, place, and time.   Skin: Skin is warm and dry. Capillary refill takes less than 2 seconds. Abscess (On the back, actively draining) noted.   Psychiatric: She has a normal mood and affect. Thought content normal.         ED Course   I & D - Incision and Drainage    Date/Time: 5/25/2022 7:25 PM  Location procedure was performed: [facility] EMERGENCY DEPARTMENT  Performed by: Yuriy Ghosh MD  Authorized by: Yuriy Ghosh MD   Assisting provider: Yuriy Ghosh MD  Pre-operative diagnosis: Abscess I&D  Post-operative diagnosis: Abscess I&D  Consent Done: Emergent Situation  Type: abscess  Body area: trunk    Patient sedated: no  Description of findings: Large amount of Perulent material is Expressed   Risk factor: underlying major vessel and  underlying major nerve  Scalpel size: 11  Incision type: single straight  Complexity: simple  Drainage:  pus  Drainage amount: copious  Wound treatment: drainage and  wound packed  Packing material: 1/4 in iodoform gauze        Labs Reviewed   COMPREHENSIVE METABOLIC PANEL - Abnormal; Notable for the following components:       Result Value    Sodium 129 (*)     Glucose 498 (*)     BUN 24 (*)     Creatinine 2.21 (*)     Albumin 2.7 (*)     Globulin 4.9 (*)     Alk Phos 106 (*)     ALT 10 (*)     AST 7 (*)     eGFR 27 (*)     All other components within normal limits   URINALYSIS, REFLEX TO URINE CULTURE - Abnormal; Notable for the following components:    Protein, UA >=300 (*)     Glucose, UA >=1000 (*)     Blood, UA Small (*)     All other components within normal limits   CBC WITH DIFFERENTIAL - Abnormal; Notable for the following components:    RBC 3.19 (*)     Hemoglobin 6.0 (*)     Hematocrit 21.3 (*)     MCV 66.8 (*)     MCH 18.8 (*)     MCHC 28.2 (*)     RDW 17.8 (*)     Platelet Count 560 (*)     Neutrophils % 78.4 (*)     Lymphocytes % 14.4 (*)     Eosinophils % 0.9 (*)     Immature Granulocytes % 0.8 (*)     Immature Granulocytes, Absolute 0.08 (*)     All other components within normal limits   CBC MORPHOLOGY - Abnormal; Notable for the following components:    Platelet Morphology Increased (*)     All other components within normal limits   URINALYSIS, MICROSCOPIC - Abnormal; Notable for the following components:    RBC, UA 5-10 (*)     Squamous Epithelial Cells, UA Few (*)     Trichomonas, UA Few (*)     All other components within normal limits   DRUG SCREEN, URINE (BEAKER) - Normal    Narrative:     The results of screening tests should be considered presumptive. Confirmatory testing is available upon request.    Cutoff Points:  PCP:         25ng/mL  AMPH:        500ng/mL  IRINA:        200ng/mL  SHAWNA:        200ng/mL  THC:         50ng/mL  LIANA:         300ng/mL  OPI:         2000ng/mL   CBC W/ AUTO DIFFERENTIAL    Narrative:     The following orders were created for panel order CBC auto  differential.  Procedure                               Abnormality         Status                     ---------                               -----------         ------                     CBC with Differential[[id number]]        Abnormal            Final result                 Please view results for these tests on the individual orders.        ECG Results          EKG 12-lead (In process)  Result time 05/26/22 05:27:54    In process by Interface, Lab In [facility] (05/26/22 05:27:54)                 Narrative:    Test Reason : R42,    Vent. Rate : 101 BPM     Atrial Rate : 000 BPM     P-R Int : 160 ms          QRS Dur : 100 ms      QT Int : 366 ms       P-R-T Axes : 057 081 018 degrees     QTc Int : 438 ms    Sinus tachycardia  Normal ECG except for rate      Referred By: [email or web address]   SELF           Confirmed By:                             Imaging Results          X-Ray Chest 1 View (Final result)  Result time 05/25/22 20:00:51    Final result by Louis Bee DO (05/25/22 20:00:51)                 Impression:      No acute cardiopulmonary process demonstrated.    Point of Service: [facility]      Electronically signed by: Louis Bee  Date:    05/25/2022  Time:    20:00             Narrative:    EXAMINATION:  XR CHEST 1 VIEW    CLINICAL HISTORY:  Palpitations    COMPARISON:  Chest x-ray April 11, 2021    TECHNIQUE:  Frontal view/views of the chest.    FINDINGS:  The cardiomediastinal silhouette is stable in configuration.  No focal consolidation, pleural effusion, or pneumothorax.  Visualized osseous and surrounding soft tissue structures appear grossly unchanged.                               CT Head Without Contrast (Final result)  Result time 05/25/22 19:57:52    Final result by Louis Bee DO (05/25/22 19:57:52)                 Impression:      No convincing imaging evidence of acute intracranial abnormality.    The CT exam was performed using one or more of the following dose    reduction  techniques- Automated exposure control, adjustment of the mA    and/or kV according to patient size, and/or use of iterative    reconstructed technique.    Point of Service: [facility]      Electronically signed by: Louis Bee  Date:    05/25/2022  Time:    19:57             Narrative:    EXAMINATION:  CT HEAD WITHOUT CONTRAST    CLINICAL HISTORY:  Dizziness;    COMPARISON:  None    TECHNIQUE:  Multiple axial tomographic images of the brain were obtained without the use of intravenous contrast.    FINDINGS:  Midline structures are nondisplaced.  No convincing evidence of acute intracranial hemorrhage.  No convincing evidence of hydrocephalus.  Visualized paranasal sinuses and mastoid air cells are predominantly clear.                                 Medications   ampicillin-sulbactam injection 3 g (3 g Intramuscular Given 5/25/22 7740)                Attending Attestation:           Physician Attestation for Scribe:  Physician Attestation Statement for Scribe #1: I, Yuriy Ghosh MD, reviewed documentation, as scribed by Olimpia You in my presence, and it is both accurate and complete.                      Clinical Impression:   Final diagnoses:  [R42] Dizzy  [R00.2] Palpitation  [L02.91] Abscess (Primary)  [D50.9] Microcytic anemia          ED Disposition Condition    Discharge Stable        ED Prescriptions     Medication Sig Dispense Start Date End Date Auth. Provider    ferrous sulfate 325 (65 FE) MG EC tablet Take 1 tablet (325 mg total) by mouth once daily. 30 tablet 5/25/2022  Yuriy Ghosh MD    amoxicillin-clavulanate 875-125mg (AUGMENTIN) 875-125 mg per tablet Take 1 tablet by mouth 2 (two) times daily. 14 tablet 5/25/2022  Yuriy Ghosh MD        Follow-up Information    None          Yuriy Ghosh MD  05/27/22 7714

## 2022-05-25 NOTE — ED TRIAGE NOTES
States that she has had some dizziness for 1 month. Also states that she has a abscess on her back that has been there for 3 months. Has not seen her PCP for either of these problems.

## 2022-05-26 ENCOUNTER — TELEPHONE (OUTPATIENT)
Dept: EMERGENCY MEDICINE | Facility: HOSPITAL | Age: 37
End: 2022-05-26
Payer: MEDICAID

## 2022-05-26 NOTE — DISCHARGE INSTRUCTIONS
Take your iron pill once a day.  Take your medications regularly.  Follow-up with your primary care physician for re-evaluation in 1 week  Return to the emergency department if symptoms are worse

## 2023-01-09 ENCOUNTER — TELEPHONE (OUTPATIENT)
Dept: FAMILY MEDICINE | Facility: CLINIC | Age: 38
End: 2023-01-09
Payer: MEDICAID

## 2023-01-09 PROBLEM — U07.1 COVID-19: Status: ACTIVE | Noted: 2021-03-20

## 2023-01-09 PROBLEM — N18.9 ACUTE KIDNEY INJURY SUPERIMPOSED ON CKD: Status: ACTIVE | Noted: 2021-03-19

## 2023-01-09 PROBLEM — E11.10 DKA, TYPE 2, NOT AT GOAL: Status: ACTIVE | Noted: 2021-03-18

## 2023-01-09 PROBLEM — N17.9 ACUTE KIDNEY INJURY SUPERIMPOSED ON CKD: Status: ACTIVE | Noted: 2021-03-19

## 2023-01-09 NOTE — TELEPHONE ENCOUNTER
COPIED FROM CHAT: trent malloy DNP   Shes on my schedule Thursday for a hospital follow up .. not sure why   Who is her doctor ?   What hospital was she in ?   And what ab all these caregaps    Pap smear   Eye appt ?   Vaccines ?     I would do her pap Thursday if she hasnt had one in 2 year and move her appt to Friday for fu if we need that .. but not sure bc I have no records on this person shes not a pt here

## 2023-01-12 ENCOUNTER — OFFICE VISIT (OUTPATIENT)
Dept: FAMILY MEDICINE | Facility: CLINIC | Age: 38
End: 2023-01-12
Payer: MEDICAID

## 2023-01-12 VITALS
OXYGEN SATURATION: 100 % | TEMPERATURE: 98 F | SYSTOLIC BLOOD PRESSURE: 128 MMHG | HEART RATE: 110 BPM | BODY MASS INDEX: 39.21 KG/M2 | WEIGHT: 249.81 LBS | HEIGHT: 67 IN | DIASTOLIC BLOOD PRESSURE: 89 MMHG | RESPIRATION RATE: 18 BRPM

## 2023-01-12 DIAGNOSIS — Z99.3 WHEELCHAIR DEPENDENT: ICD-10-CM

## 2023-01-12 DIAGNOSIS — E11.9 TYPE 2 DIABETES MELLITUS WITHOUT COMPLICATION, WITHOUT LONG-TERM CURRENT USE OF INSULIN: Primary | ICD-10-CM

## 2023-01-12 DIAGNOSIS — N18.4 STAGE 4 CHRONIC KIDNEY DISEASE: ICD-10-CM

## 2023-01-12 DIAGNOSIS — Z11.59 SCREENING FOR VIRAL DISEASE: ICD-10-CM

## 2023-01-12 DIAGNOSIS — I50.9 CONGESTIVE HEART FAILURE, UNSPECIFIED HF CHRONICITY, UNSPECIFIED HEART FAILURE TYPE: ICD-10-CM

## 2023-01-12 DIAGNOSIS — Z13.6 ENCOUNTER FOR SCREENING FOR CARDIOVASCULAR DISORDERS: ICD-10-CM

## 2023-01-12 DIAGNOSIS — Z86.73 HISTORY OF CVA (CEREBROVASCULAR ACCIDENT): ICD-10-CM

## 2023-01-12 DIAGNOSIS — E61.1 IRON DEFICIENCY: ICD-10-CM

## 2023-01-12 DIAGNOSIS — I21.4 NSTEMI (NON-ST ELEVATED MYOCARDIAL INFARCTION): ICD-10-CM

## 2023-01-12 DIAGNOSIS — S21.209A OPEN WOUND OF BACK, UNSPECIFIED LATERALITY, INITIAL ENCOUNTER: ICD-10-CM

## 2023-01-12 DIAGNOSIS — Z23 FLU VACCINE NEED: ICD-10-CM

## 2023-01-12 DIAGNOSIS — Z79.899 OTHER LONG TERM (CURRENT) DRUG THERAPY: ICD-10-CM

## 2023-01-12 DIAGNOSIS — Z23 IMMUNIZATION DUE: ICD-10-CM

## 2023-01-12 DIAGNOSIS — Z30.2 ENCOUNTER FOR TUBAL LIGATION: ICD-10-CM

## 2023-01-12 PROBLEM — E11.22 TYPE 2 DIABETES MELLITUS WITH STAGE 4 CHRONIC KIDNEY DISEASE, WITH LONG-TERM CURRENT USE OF INSULIN: Status: ACTIVE | Noted: 2021-03-18

## 2023-01-12 PROBLEM — L02.212 ABSCESS OF BACK: Status: ACTIVE | Noted: 2022-12-03

## 2023-01-12 PROBLEM — Z79.4 TYPE 2 DIABETES MELLITUS WITH STAGE 4 CHRONIC KIDNEY DISEASE, WITH LONG-TERM CURRENT USE OF INSULIN: Status: ACTIVE | Noted: 2021-03-18

## 2023-01-12 PROBLEM — E66.811 CLASS 1 OBESITY WITH BODY MASS INDEX (BMI) OF 33.0 TO 33.9 IN ADULT: Status: ACTIVE | Noted: 2022-12-04

## 2023-01-12 PROBLEM — E66.9 CLASS 1 OBESITY WITH BODY MASS INDEX (BMI) OF 33.0 TO 33.9 IN ADULT: Status: ACTIVE | Noted: 2022-12-04

## 2023-01-12 PROBLEM — D62 ACUTE BLOOD LOSS ANEMIA: Status: ACTIVE | Noted: 2022-12-04

## 2023-01-12 LAB
ALBUMIN SERPL BCP-MCNC: 3.1 G/DL (ref 3.5–5)
ALBUMIN/GLOB SERPL: 0.6 {RATIO}
ALP SERPL-CCNC: 131 U/L (ref 37–98)
ALT SERPL W P-5'-P-CCNC: 21 U/L (ref 13–56)
ANION GAP SERPL CALCULATED.3IONS-SCNC: 22 MMOL/L (ref 7–16)
ANISOCYTOSIS BLD QL SMEAR: ABNORMAL
AST SERPL W P-5'-P-CCNC: 11 U/L (ref 15–37)
BASOPHILS # BLD AUTO: 0.03 K/UL (ref 0–0.2)
BASOPHILS NFR BLD AUTO: 0.3 % (ref 0–1)
BILIRUB SERPL-MCNC: 0.1 MG/DL (ref ?–1.2)
BUN SERPL-MCNC: 61 MG/DL (ref 7–18)
BUN/CREAT SERPL: 18 (ref 6–20)
CALCIUM SERPL-MCNC: 8.9 MG/DL (ref 8.5–10.1)
CHLORIDE SERPL-SCNC: 108 MMOL/L (ref 98–107)
CHOLEST SERPL-MCNC: 320 MG/DL (ref 0–200)
CHOLEST/HDLC SERPL: 5.4 {RATIO}
CK MB SERPL-MCNC: 3.2 NG/ML (ref 1–3.6)
CK SERPL-CCNC: 65 U/L (ref 26–192)
CO2 SERPL-SCNC: 11 MMOL/L (ref 21–32)
CREAT SERPL-MCNC: 3.33 MG/DL (ref 0.55–1.02)
D DIMER PPP FEU-MCNC: 1.09 ΜG/ML (ref 0–0.47)
DIFFERENTIAL METHOD BLD: ABNORMAL
EGFR (NO RACE VARIABLE) (RUSH/TITUS): 17 ML/MIN/1.73M²
EOSINOPHIL # BLD AUTO: 0.07 K/UL (ref 0–0.5)
EOSINOPHIL NFR BLD AUTO: 0.8 % (ref 1–4)
ERYTHROCYTE [DISTWIDTH] IN BLOOD BY AUTOMATED COUNT: 24.8 % (ref 11.5–14.5)
EST. AVERAGE GLUCOSE BLD GHB EST-MCNC: 244 MG/DL
FOLATE SERPL-MCNC: 6.4 NG/ML (ref 3.1–17.5)
GLOBULIN SER-MCNC: 5 G/DL (ref 2–4)
GLUCOSE SERPL-MCNC: 255 MG/DL (ref 74–106)
GLUCOSE SERPL-MCNC: 300 MG/DL (ref 70–110)
HBA1C MFR BLD HPLC: 9.9 % (ref 4.5–6.6)
HCT VFR BLD AUTO: 31 % (ref 38–47)
HCV AB SER QL: NORMAL
HDLC SERPL-MCNC: 59 MG/DL (ref 40–60)
HGB BLD-MCNC: 9.5 G/DL (ref 12–16)
HIV 1+O+2 AB SERPL QL: NORMAL
IMM GRANULOCYTES # BLD AUTO: 0.02 K/UL (ref 0–0.04)
IMM GRANULOCYTES NFR BLD: 0.2 % (ref 0–0.4)
IRON SATN MFR SERPL: 16 % (ref 14–50)
IRON SERPL-MCNC: 49 ΜG/DL (ref 50–170)
LDLC SERPL CALC-MCNC: 223 MG/DL
LDLC/HDLC SERPL: 3.8 {RATIO}
LYMPHOCYTES # BLD AUTO: 1.84 K/UL (ref 1–4.8)
LYMPHOCYTES NFR BLD AUTO: 21.4 % (ref 27–41)
MAGNESIUM SERPL-MCNC: 1.7 MG/DL (ref 1.7–2.3)
MCH RBC QN AUTO: 24.1 PG (ref 27–31)
MCHC RBC AUTO-ENTMCNC: 30.6 G/DL (ref 32–36)
MCV RBC AUTO: 78.7 FL (ref 80–96)
MICROCYTES BLD QL SMEAR: ABNORMAL
MONOCYTES # BLD AUTO: 0.32 K/UL (ref 0–0.8)
MONOCYTES NFR BLD AUTO: 3.7 % (ref 2–6)
MPC BLD CALC-MCNC: 10.7 FL (ref 9.4–12.4)
NEUTROPHILS # BLD AUTO: 6.3 K/UL (ref 1.8–7.7)
NEUTROPHILS NFR BLD AUTO: 73.6 % (ref 53–65)
NONHDLC SERPL-MCNC: 261 MG/DL
NRBC # BLD AUTO: 0 X10E3/UL
NRBC, AUTO (.00): 0 %
NT-PROBNP SERPL-MCNC: ABNORMAL PG/ML (ref 1–125)
PLATELET # BLD AUTO: 374 K/UL (ref 150–400)
PLATELET MORPHOLOGY: ABNORMAL
POLYCHROMASIA BLD QL SMEAR: ABNORMAL
POTASSIUM SERPL-SCNC: 5.5 MMOL/L (ref 3.5–5.1)
PROT SERPL-MCNC: 8.1 G/DL (ref 6.4–8.2)
RBC # BLD AUTO: 3.94 M/UL (ref 4.2–5.4)
SODIUM SERPL-SCNC: 135 MMOL/L (ref 136–145)
T4 FREE SERPL-MCNC: 0.96 NG/DL (ref 0.76–1.46)
TIBC SERPL-MCNC: 305 ΜG/DL (ref 250–450)
TRIGL SERPL-MCNC: 188 MG/DL (ref 35–150)
TROPONIN I SERPL HS-MCNC: 39 PG/ML
TSH SERPL DL<=0.005 MIU/L-ACNC: 3.1 UIU/ML (ref 0.36–3.74)
VIT B12 SERPL-MCNC: 708 PG/ML (ref 193–986)
VLDLC SERPL-MCNC: 38 MG/DL
WBC # BLD AUTO: 8.58 K/UL (ref 4.5–11)

## 2023-01-12 PROCEDURE — 83540 ASSAY OF IRON: CPT | Mod: ,,, | Performed by: CLINICAL MEDICAL LABORATORY

## 2023-01-12 PROCEDURE — 80053 COMPREHEN METABOLIC PANEL: CPT | Mod: ,,, | Performed by: CLINICAL MEDICAL LABORATORY

## 2023-01-12 PROCEDURE — 82962 POCT GLUCOSE, HAND-HELD DEVICE: ICD-10-PCS | Mod: QW,,, | Performed by: NURSE PRACTITIONER

## 2023-01-12 PROCEDURE — 85025 COMPLETE CBC W/AUTO DIFF WBC: CPT | Mod: ,,, | Performed by: CLINICAL MEDICAL LABORATORY

## 2023-01-12 PROCEDURE — 90670 PCV13 VACCINE IM: CPT | Mod: ,,, | Performed by: NURSE PRACTITIONER

## 2023-01-12 PROCEDURE — 84439 THYROID PANEL: ICD-10-PCS | Mod: ,,, | Performed by: CLINICAL MEDICAL LABORATORY

## 2023-01-12 PROCEDURE — 12001 PR RESUPERF WND BODY <2.5CM: ICD-10-PCS | Mod: ,,, | Performed by: NURSE PRACTITIONER

## 2023-01-12 PROCEDURE — 83735 ASSAY OF MAGNESIUM: CPT | Mod: ,,, | Performed by: CLINICAL MEDICAL LABORATORY

## 2023-01-12 PROCEDURE — 3074F SYST BP LT 130 MM HG: CPT | Mod: ,,, | Performed by: NURSE PRACTITIONER

## 2023-01-12 PROCEDURE — 82746 ASSAY OF FOLIC ACID SERUM: CPT | Mod: ,,, | Performed by: CLINICAL MEDICAL LABORATORY

## 2023-01-12 PROCEDURE — 82607 VITAMIN B12/FOLATE, SERUM PANEL: ICD-10-PCS | Mod: ,,, | Performed by: CLINICAL MEDICAL LABORATORY

## 2023-01-12 PROCEDURE — 84443 ASSAY THYROID STIM HORMONE: CPT | Mod: ,,, | Performed by: CLINICAL MEDICAL LABORATORY

## 2023-01-12 PROCEDURE — 82553 CK TOTAL & CKMB: ICD-10-PCS | Mod: ,,, | Performed by: CLINICAL MEDICAL LABORATORY

## 2023-01-12 PROCEDURE — 90651 9VHPV VACCINE 2/3 DOSE IM: CPT | Mod: ,,, | Performed by: NURSE PRACTITIONER

## 2023-01-12 PROCEDURE — 80061 LIPID PANEL: CPT | Mod: ,,, | Performed by: CLINICAL MEDICAL LABORATORY

## 2023-01-12 PROCEDURE — 83550 IRON AND TIBC: ICD-10-PCS | Mod: ,,, | Performed by: CLINICAL MEDICAL LABORATORY

## 2023-01-12 PROCEDURE — 84443 THYROID PANEL: ICD-10-PCS | Mod: ,,, | Performed by: CLINICAL MEDICAL LABORATORY

## 2023-01-12 PROCEDURE — 82607 VITAMIN B-12: CPT | Mod: ,,, | Performed by: CLINICAL MEDICAL LABORATORY

## 2023-01-12 PROCEDURE — 90715 TDAP VACCINE 7 YRS/> IM: CPT | Mod: ,,, | Performed by: NURSE PRACTITIONER

## 2023-01-12 PROCEDURE — 82550 CK TOTAL & CKMB: ICD-10-PCS | Mod: ,,, | Performed by: CLINICAL MEDICAL LABORATORY

## 2023-01-12 PROCEDURE — 99215 PR OFFICE/OUTPT VISIT, EST, LEVL V, 40-54 MIN: ICD-10-PCS | Mod: 25,,, | Performed by: NURSE PRACTITIONER

## 2023-01-12 PROCEDURE — 90715 TDAP VACCINE GREATER THAN OR EQUAL TO 7YO IM: ICD-10-PCS | Mod: ,,, | Performed by: NURSE PRACTITIONER

## 2023-01-12 PROCEDURE — 83735 MAGNESIUM: ICD-10-PCS | Mod: ,,, | Performed by: CLINICAL MEDICAL LABORATORY

## 2023-01-12 PROCEDURE — 82306 VITAMIN D: ICD-10-PCS | Mod: ,,, | Performed by: CLINICAL MEDICAL LABORATORY

## 2023-01-12 PROCEDURE — 83550 IRON BINDING TEST: CPT | Mod: ,,, | Performed by: CLINICAL MEDICAL LABORATORY

## 2023-01-12 PROCEDURE — 83540 IRON AND TIBC: ICD-10-PCS | Mod: ,,, | Performed by: CLINICAL MEDICAL LABORATORY

## 2023-01-12 PROCEDURE — 82962 GLUCOSE BLOOD TEST: CPT | Mod: QW,,, | Performed by: NURSE PRACTITIONER

## 2023-01-12 PROCEDURE — 83036 HEMOGLOBIN GLYCOSYLATED A1C: CPT | Mod: ,,, | Performed by: CLINICAL MEDICAL LABORATORY

## 2023-01-12 PROCEDURE — 86803 HEPATITIS C AB TEST: CPT | Mod: ,,, | Performed by: CLINICAL MEDICAL LABORATORY

## 2023-01-12 PROCEDURE — 83880 NT-PRO NATRIURETIC PEPTIDE: ICD-10-PCS | Mod: ,,, | Performed by: CLINICAL MEDICAL LABORATORY

## 2023-01-12 PROCEDURE — 86803 HEPATITIS C ANTIBODY: ICD-10-PCS | Mod: ,,, | Performed by: CLINICAL MEDICAL LABORATORY

## 2023-01-12 PROCEDURE — 80061 LIPID PANEL: ICD-10-PCS | Mod: ,,, | Performed by: CLINICAL MEDICAL LABORATORY

## 2023-01-12 PROCEDURE — 84439 ASSAY OF FREE THYROXINE: CPT | Mod: ,,, | Performed by: CLINICAL MEDICAL LABORATORY

## 2023-01-12 PROCEDURE — 12001 RPR S/N/AX/GEN/TRNK 2.5CM/<: CPT | Mod: ,,, | Performed by: NURSE PRACTITIONER

## 2023-01-12 PROCEDURE — 87389 HIV-1 AG W/HIV-1&-2 AB AG IA: CPT | Mod: ,,, | Performed by: CLINICAL MEDICAL LABORATORY

## 2023-01-12 PROCEDURE — 84484 TROPONIN I: ICD-10-PCS | Mod: ,,, | Performed by: CLINICAL MEDICAL LABORATORY

## 2023-01-12 PROCEDURE — 3008F PR BODY MASS INDEX (BMI) DOCUMENTED: ICD-10-PCS | Mod: ,,, | Performed by: NURSE PRACTITIONER

## 2023-01-12 PROCEDURE — 90670 PNEUMOCOCCAL CONJUGATE VACCINE 13-VALENT LESS THAN 5YO & GREATER THAN: ICD-10-PCS | Mod: ,,, | Performed by: NURSE PRACTITIONER

## 2023-01-12 PROCEDURE — 99215 OFFICE O/P EST HI 40 MIN: CPT | Mod: 25,,, | Performed by: NURSE PRACTITIONER

## 2023-01-12 PROCEDURE — 85025 CBC WITH DIFFERENTIAL: ICD-10-PCS | Mod: ,,, | Performed by: CLINICAL MEDICAL LABORATORY

## 2023-01-12 PROCEDURE — 82746 VITAMIN B12/FOLATE, SERUM PANEL: ICD-10-PCS | Mod: ,,, | Performed by: CLINICAL MEDICAL LABORATORY

## 2023-01-12 PROCEDURE — 3074F PR MOST RECENT SYSTOLIC BLOOD PRESSURE < 130 MM HG: ICD-10-PCS | Mod: ,,, | Performed by: NURSE PRACTITIONER

## 2023-01-12 PROCEDURE — 80053 COMPREHENSIVE METABOLIC PANEL: ICD-10-PCS | Mod: ,,, | Performed by: CLINICAL MEDICAL LABORATORY

## 2023-01-12 PROCEDURE — 84484 ASSAY OF TROPONIN QUANT: CPT | Mod: ,,, | Performed by: CLINICAL MEDICAL LABORATORY

## 2023-01-12 PROCEDURE — 90651 HPV VACCINE 9-VALENT 3 DOSE IM: ICD-10-PCS | Mod: ,,, | Performed by: NURSE PRACTITIONER

## 2023-01-12 PROCEDURE — 82553 CREATINE MB FRACTION: CPT | Mod: ,,, | Performed by: CLINICAL MEDICAL LABORATORY

## 2023-01-12 PROCEDURE — 82550 ASSAY OF CK (CPK): CPT | Mod: ,,, | Performed by: CLINICAL MEDICAL LABORATORY

## 2023-01-12 PROCEDURE — 83880 ASSAY OF NATRIURETIC PEPTIDE: CPT | Mod: ,,, | Performed by: CLINICAL MEDICAL LABORATORY

## 2023-01-12 PROCEDURE — 83036 HEMOGLOBIN A1C: ICD-10-PCS | Mod: ,,, | Performed by: CLINICAL MEDICAL LABORATORY

## 2023-01-12 PROCEDURE — 82306 VITAMIN D 25 HYDROXY: CPT | Mod: ,,, | Performed by: CLINICAL MEDICAL LABORATORY

## 2023-01-12 PROCEDURE — 87389 HIV 1 / 2 ANTIBODY: ICD-10-PCS | Mod: ,,, | Performed by: CLINICAL MEDICAL LABORATORY

## 2023-01-12 PROCEDURE — 3008F BODY MASS INDEX DOCD: CPT | Mod: ,,, | Performed by: NURSE PRACTITIONER

## 2023-01-12 RX ORDER — INSULIN DEGLUDEC 200 U/ML
15 INJECTION, SOLUTION SUBCUTANEOUS DAILY
Qty: 3 PEN | Refills: 0 | Status: SHIPPED | OUTPATIENT
Start: 2023-01-12 | End: 2023-04-12

## 2023-01-12 RX ORDER — SACUBITRIL AND VALSARTAN 24; 26 MG/1; MG/1
1 TABLET, FILM COATED ORAL 2 TIMES DAILY
Qty: 180 TABLET | Refills: 1 | Status: SHIPPED | OUTPATIENT
Start: 2023-01-12 | End: 2023-01-30

## 2023-01-12 RX ORDER — ATORVASTATIN CALCIUM 40 MG/1
40 TABLET, FILM COATED ORAL NIGHTLY
Qty: 90 TABLET | Refills: 1 | Status: SHIPPED | OUTPATIENT
Start: 2023-01-12 | End: 2023-05-15 | Stop reason: SDUPTHER

## 2023-01-12 RX ORDER — SERTRALINE HYDROCHLORIDE 25 MG/1
25 TABLET, FILM COATED ORAL DAILY
Qty: 30 TABLET | Refills: 11 | Status: SHIPPED | OUTPATIENT
Start: 2023-01-12 | End: 2023-05-15

## 2023-01-12 RX ORDER — SODIUM BICARBONATE 650 MG/1
650 TABLET ORAL 2 TIMES DAILY
Qty: 180 TABLET | Refills: 1 | Status: SHIPPED | OUTPATIENT
Start: 2023-01-12 | End: 2023-05-15 | Stop reason: SDUPTHER

## 2023-01-12 RX ORDER — ERGOCALCIFEROL 1.25 MG/1
50000 CAPSULE ORAL
COMMUNITY
Start: 2022-12-24 | End: 2023-01-12

## 2023-01-12 RX ORDER — WARFARIN 7.5 MG/1
7.5 TABLET ORAL
COMMUNITY
Start: 2022-12-24 | End: 2023-05-15 | Stop reason: SDUPTHER

## 2023-01-12 RX ORDER — SODIUM BICARBONATE 650 MG/1
1 TABLET ORAL 3 TIMES DAILY
COMMUNITY
Start: 2022-12-24 | End: 2023-01-12 | Stop reason: SDUPTHER

## 2023-01-12 RX ORDER — TORSEMIDE 20 MG/1
1 TABLET ORAL DAILY
COMMUNITY
Start: 2022-12-25 | End: 2023-01-12

## 2023-01-12 RX ORDER — ASPIRIN 81 MG/1
1 TABLET ORAL DAILY
COMMUNITY
Start: 2022-12-25 | End: 2023-01-12 | Stop reason: SDUPTHER

## 2023-01-12 RX ORDER — CARVEDILOL 12.5 MG/1
1 TABLET ORAL 2 TIMES DAILY WITH MEALS
COMMUNITY
Start: 2022-12-25 | End: 2023-01-12

## 2023-01-12 RX ORDER — ASPIRIN 81 MG/1
81 TABLET ORAL DAILY
Qty: 30 TABLET | Refills: 11 | Status: SHIPPED | OUTPATIENT
Start: 2023-01-12 | End: 2023-05-15 | Stop reason: SDUPTHER

## 2023-01-12 RX ORDER — METOPROLOL SUCCINATE 25 MG/1
25 TABLET, EXTENDED RELEASE ORAL DAILY
Qty: 30 TABLET | Refills: 11 | Status: SHIPPED | OUTPATIENT
Start: 2023-01-12 | End: 2023-01-30

## 2023-01-12 RX ORDER — MUPIROCIN 20 MG/G
OINTMENT TOPICAL 3 TIMES DAILY
Qty: 30 G | Refills: 0 | Status: SHIPPED | OUTPATIENT
Start: 2023-01-12 | End: 2023-01-30

## 2023-01-12 NOTE — PROGRESS NOTES
Manuela Mathis DNP   [address] [place] [address]     PATIENT NAME: Yolie Egan  : 1985  DATE: 23  MRN: [id number]      Billing Provider: Manuela Mathis DNP  Level of Service:   Patient PCP Information       Provider PCP Type    Primary Doctor No General            Reason for Visit / Chief Complaint: Hospital Follow Up and Establish Care       Update PCP  Update Chief Complaint         History of Present Illness / Problem Focused Workflow     Yolie Egan presents to the clinic with Hospital Follow Up and Establish Care     HPI    Review of Systems     Review of Systems     Medical / Social / Family History     Past Medical History:   Diagnosis Date    Acute cystitis 3/27/2021    Diabetes mellitus     type II    Diabetic ketoacidosis without coma associated with type 2 diabetes mellitus 3/13/2021    HTN (hypertension) 3/19/2021    Hyperkalemia 2021    Hypertension     Ketoacidosis due to type 2 diabetes mellitus     Morbid obesity     Symptomatic anemia        Past Surgical History:   Procedure Laterality Date    abcess of buttocks      CLOSURE OF WOUND Left 3/30/2021    Procedure: CLOSURE, WOUND;  Surgeon: Sebas Shea DO;  Location: [facility] OR;  Service: General;  Laterality: Left;    COLON SURGERY      ILEOSTOMY CLOSURE N/A 2021    Procedure: CLOSURE, ILEOSTOMY;  Surgeon: Sebas Shea DO;  Location: [facility];  Service: General;  Laterality: N/A;    wound debribement         Social History  Ms.  reports that she quit smoking about 2 months ago. Her smoking use included cigars. She started smoking about 23 years ago. She has never used smokeless tobacco. She reports that she does not drink alcohol and does not use drugs.    Family History  Ms.'s family history includes Diabetes in her father and mother; Heart disease in her father and mother; Hypertension in her father and mother; Kidney failure in her mother.    Medications and  Allergies     Medications  Outpatient Medications Marked as Taking for the 1/12/23 encounter (Office Visit) with Manuela Mathis DNP   Medication Sig Dispense Refill    warfarin (COUMADIN) 7.5 MG tablet Take 7.5 mg by mouth.      [DISCONTINUED] aspirin (ECOTRIN) 81 MG EC tablet Take 1 tablet by mouth once daily.      [DISCONTINUED] atorvastatin (LIPITOR) 20 MG tablet Take 20 mg by mouth every evening.      [DISCONTINUED] carvediloL (COREG) 12.5 MG tablet Take 1 tablet by mouth 2 (two) times daily with meals.      [DISCONTINUED] ergocalciferol (ERGOCALCIFEROL) 50,000 unit Cap Take 50,000 Units by mouth.      [DISCONTINUED] ferrous sulfate 325 (65 FE) MG EC tablet Take 1 tablet (325 mg total) by mouth once daily. 30 tablet 0    [DISCONTINUED] sodium bicarbonate 650 MG tablet Take 1 tablet by mouth 3 (three) times daily.      [DISCONTINUED] torsemide (DEMADEX) 20 MG Tab Take 1 tablet by mouth once daily.         Allergies  Review of patient's allergies indicates:  No Known Allergies    Physical Examination   LMP 11/19/2022 (Exact Date)    Physical Exam  Cardiovascular:      Pulses:           Dorsalis pedis pulses are 2+ on the right side and 2+ on the left side.        Posterior tibial pulses are 2+ on the right side and 2+ on the left side.   Feet:      Right foot:      Protective Sensation: 10 sites tested.  4 sites sensed.      Skin integrity: Callus and dry skin present.      Toenail Condition: Right toenails are abnormally thick and long. Fungal disease present.     Left foot:      Protective Sensation: 10 sites tested.  4 sites sensed.      Skin integrity: Callus and dry skin present.      Toenail Condition: Left toenails are abnormally thick and long. Fungal disease present.       Assessment and Plan (including Health Maintenance)      Problem List  Smart Sets  Document Outside HM   :    Plan:   Here to reestablish care  Has been in [facility] off and on since May 2022  Says shes had CHF Nstemi CVA left side  residual      Needs pap   Pcv today  Tdap today  Hpv today    Labs today   Needs cards and nephrology referral  Needs home health with PT     Out of meds since dec after leaving hospital     Had large abscess removed from mid back. Also needing sutures removed while here today  In manual wheelchair she borrowed    Eye exam scheduled for [facility] [place] feb 2 at 1240    Foot exam today  Bring urine micro back tomorrow with pap       Health Maintenance Due   Topic Date Due    Diabetes Urine Screening  Never done    Foot Exam  Never done    Eye Exam  Never done    HIV Screening  Never done    TETANUS VACCINE  Never done    Pneumococcal Vaccines (Age 0-64) (2 - PCV) 03/19/2022       Problem List Items Addressed This Visit          Renal/    Stage 4 chronic kidney disease    Relevant Orders    Ambulatory referral/consult to Nephrology    Ambulatory referral/consult to Home Health       Endocrine    Type 2 diabetes mellitus, without long-term current use of insulin (Chronic)    Relevant Medications    insulin degludec (TRESIBA FLEXTOUCH U-200) 200 unit/mL (3 mL) insulin pen    Other Relevant Orders    CBC Auto Differential    Comprehensive Metabolic Panel    Hemoglobin A1C    Microalbumin/Creatinine Ratio, Urine    Ambulatory referral/consult to Home Health     Other Visit Diagnoses       Congestive heart failure, unspecified HF chronicity, unspecified heart failure type    -  Primary    Relevant Orders    Ambulatory referral/consult to Cardiology    History of CVA (cerebrovascular accident)        Relevant Orders    Ambulatory referral/consult to Nephrology    Ambulatory referral/consult to Home Health    NSTEMI (non-ST elevated myocardial infarction)        Relevant Orders    CK Total & CKMB    Magnesium    Troponin I    D-Dimer, Quantitative    NT-Pro Natriuretic Peptide    Screening for viral disease        Relevant Orders    Hepatitis C Antibody    HIV 1/2 Ag/Ab (4th Gen)    Other long term (current) drug  therapy        Relevant Orders    Vitamin D    Vitamin B12 & Folate    Thyroid Panel    Encounter for screening for cardiovascular disorders        Relevant Orders    Lipid Panel    Iron deficiency        Relevant Orders    Iron and TIBC            Health Maintenance Topics with due status: Not Due       Topic Last Completion Date    Cervical Cancer Screening 03/23/2020    Lipid Panel 12/03/2022    Hemoglobin A1c 12/03/2022       Future Appointments   Date Time Provider Department Center   1/13/2023 10:30 AM [facility]   1/30/2023 10:15 AM [facility]   4/12/2023  8:15 AM [facility]            Signature:  Manuela Mathis DNP      [address] [place] [address]    Date of encounter: 1/12/23

## 2023-01-12 NOTE — PROGRESS NOTES
Manuela Mathis DNP   [address] [place] [address]     PATIENT NAME: Yolie Egan  : 1985  DATE: 23  MRN: [id number]      Billing Provider: Manuela Mathis DNP  Level of Service: RI OFFICE/OUTPT VISIT, SAMEER MURILLO, 60-74 MIN  Patient PCP Information       Provider PCP Type    Primary Doctor No General            Reason for Visit / Chief Complaint: Hospital Follow Up and Establish Care       Update PCP  Update Chief Complaint         History of Present Illness / Problem Focused Workflow     Yolie Egan presents to the clinic with Hospital Follow Up and Establish Care     HPI    Review of Systems     Review of Systems     Medical / Social / Family History     Past Medical History:   Diagnosis Date    Acute cystitis 3/27/2021    Diabetes mellitus     type II    Diabetic ketoacidosis without coma associated with type 2 diabetes mellitus 3/13/2021    HTN (hypertension) 3/19/2021    Hyperkalemia 2021    Hypertension     Ketoacidosis due to type 2 diabetes mellitus     Morbid obesity     Symptomatic anemia        Past Surgical History:   Procedure Laterality Date    abcess of buttocks      CLOSURE OF WOUND Left 3/30/2021    Procedure: CLOSURE, WOUND;  Surgeon: Sebas Shea DO;  Location: [facility];  Service: General;  Laterality: Left;    COLON SURGERY      ILEOSTOMY CLOSURE N/A 2021    Procedure: CLOSURE, ILEOSTOMY;  Surgeon: Sebas Shea DO;  Location: [facility];  Service: General;  Laterality: N/A;    wound debribement         Social History  Ms.  reports that she quit smoking about 2 months ago. Her smoking use included cigars. She started smoking about 23 years ago. She has never used smokeless tobacco. She reports that she does not drink alcohol and does not use drugs.    Family History  Ms.'s family history includes Diabetes in her father and mother; Heart disease in her father and mother; Hypertension in her father and mother; Kidney failure in  her mother.    Medications and Allergies     Medications  Outpatient Medications Marked as Taking for the 1/12/23 encounter (Office Visit) with Manuela Mathis DNP   Medication Sig Dispense Refill    warfarin (COUMADIN) 7.5 MG tablet Take 7.5 mg by mouth.      [DISCONTINUED] aspirin (ECOTRIN) 81 MG EC tablet Take 1 tablet by mouth once daily.      [DISCONTINUED] atorvastatin (LIPITOR) 20 MG tablet Take 20 mg by mouth every evening.      [DISCONTINUED] carvediloL (COREG) 12.5 MG tablet Take 1 tablet by mouth 2 (two) times daily with meals.      [DISCONTINUED] ergocalciferol (ERGOCALCIFEROL) 50,000 unit Cap Take 50,000 Units by mouth.      [DISCONTINUED] ferrous sulfate 325 (65 FE) MG EC tablet Take 1 tablet (325 mg total) by mouth once daily. 30 tablet 0    [DISCONTINUED] sodium bicarbonate 650 MG tablet Take 1 tablet by mouth 3 (three) times daily.      [DISCONTINUED] torsemide (DEMADEX) 20 MG Tab Take 1 tablet by mouth once daily.         Allergies  Review of patient's allergies indicates:  No Known Allergies    Physical Examination   LMP 11/19/2022 (Exact Date)    Physical Exam  Vitals and nursing note reviewed.   Constitutional:       Appearance: Normal appearance. She is obese. She is ill-appearing.   HENT:      Head: Normocephalic.      Nose: Nose normal.      Mouth/Throat:      Mouth: Mucous membranes are moist.   Eyes:      Pupils: Pupils are equal, round, and reactive to light.   Cardiovascular:      Rate and Rhythm: Normal rate and regular rhythm.      Pulses: Normal pulses.      Heart sounds: Murmur heard.   Pulmonary:      Effort: Pulmonary effort is normal.      Breath sounds: Normal breath sounds.   Abdominal:      General: Abdomen is flat. Bowel sounds are normal.      Palpations: Abdomen is soft.   Musculoskeletal:         General: Deformity present.      Cervical back: Normal range of motion and neck supple.      Right lower leg: Edema present.      Left lower leg: Edema present.      Comments: BLE  +1  In wheelchair she borrowed. Sits in chair all day. Difficulty with transfer as her left side is significantly weak. +foot drop also. Uses right side of body to assist left side of body.    Skin:     General: Skin is warm and dry.      Capillary Refill: Capillary refill takes less than 2 seconds.      Findings: Lesion present.      Comments: Apporox 5 inch surgical incision to mid back with suture overgrowth - pt never went back to appt to have removed.   It appears to have a bubbled area  far left pictures taken in media - hard rubbery nodule like - possible end of tube drain she had in there removed prior to leaving hospital but left sutures in?   Questioned if patient removed herself she denies.     Cleaned with soap and water. Removed 5 sutures -   Cleaned with soap and water.   Applied 4 3.0 vicryl sutures to open hold area. Some serous drainage noted after I removed what appears to be a hard rubbery substance. Tolerated well.   Mupirocin and bandaid applied.    Neurological:      General: No focal deficit present.      Mental Status: She is alert and oriented to person, place, and time. Mental status is at baseline.   Psychiatric:         Mood and Affect: Mood normal.         Behavior: Behavior normal.         Thought Content: Thought content normal.         Judgment: Judgment normal.        Assessment and Plan (including Health Maintenance)      Problem List  Smart Sets  Document Outside HM   :    Plan  Here today to re-establish care  Has been in and out of hospital in [facility] with what she says is CVA, NSTEMI , CHF and CKD  She was noncompliant with medications years ago known to me.   Back today last seen 3 years ago.   At last discharge from [facility] - 1 month ago. Has not been on any meds as her medicaid denied     Needs referral to cards  Needs referral nephro    Labs today  Needs home health and PT eval.   In a manual wheelchair she borrowed and is unable to maneuver has her best friend with her  helping.   She has a 16yo son at home.     Needs eye exam. Scheduled for [facility] [place] feb 2  Bring urine sample back tomorrow for pap smear  Sutured old wound    Refill meds.   Sample of entresto and tresiba given in clinic. 10 units given today. Fasting  in office  Start 15 units bedtime keep bs log  [place] meter and supplies given to start. Will send order     *Patient in non-ambulatory and sits in wheelchair all day and chair. Her family gets her in and out of bed and in and out wheelchair. She would not be safe or functional trying to use a cane or walker for mobility purposes due to left sided weakness. She currently has a worn out manual wheelchair that her family member has let her borrow. She is not able to push it at all due to her body habitus , CHF and CVA. She sits in a manual wheechair mostly all day in one spot of the house. She needs a power wheelchair to give her some independence and freedom to move about. She is very alert and would love to be more active and independent especially since she has a teenage son. The power wheelchair will give her the freedom to complete her own ADLs. The chair will need speciality seating and controls due to her left sided weakness from CVA.     See back for pap  Bring sugar log back 2 weeks to check on diabetes.   3 month routine appt.               Health Maintenance Due   Topic Date Due    Diabetes Urine Screening  Never done    Eye Exam  Never done    HIV Screening  Never done       Problem List Items Addressed This Visit          Neuro    History of CVA (cerebrovascular accident)    Relevant Orders    Ambulatory referral/consult to Nephrology    Ambulatory referral/consult to Home Health       Cardiac/Vascular    Congestive heart failure - Primary    Relevant Orders    Ambulatory referral/consult to Cardiology    NSTEMI (non-ST elevated myocardial infarction)    Relevant Orders    CK Total & CKMB    Magnesium    Troponin I    D-Dimer, Quantitative     NT-Pro Natriuretic Peptide    Encounter for screening for cardiovascular disorders    Relevant Orders    Lipid Panel       Renal/    Stage 4 chronic kidney disease    Relevant Orders    Ambulatory referral/consult to Nephrology    Ambulatory referral/consult to Home Health       ID    Screening for viral disease    Relevant Orders    Hepatitis C Antibody    HIV 1/2 Ag/Ab (4th Gen)       Oncology    Iron deficiency    Relevant Orders    Iron and TIBC       Endocrine    Type 2 diabetes mellitus, without long-term current use of insulin (Chronic)    Relevant Medications    insulin degludec (TRESIBA FLEXTOUCH U-200) 200 unit/mL (3 mL) insulin pen    Other Relevant Orders    CBC Auto Differential    Comprehensive Metabolic Panel    Hemoglobin A1C    Microalbumin/Creatinine Ratio, Urine    Ambulatory referral/consult to Home Health       Orthopedic    Open wound of back    Relevant Orders    Laceration repair       Palliative Care    Other long term (current) drug therapy    Relevant Orders    Vitamin D    Vitamin B12 & Folate    Thyroid Panel       Health Maintenance Topics with due status: Not Due       Topic Last Completion Date    Cervical Cancer Screening 03/23/2020    Lipid Panel 12/03/2022    Hemoglobin A1c 12/03/2022    TETANUS VACCINE 01/12/2023    Pneumococcal Vaccines (Age 0-64) 01/12/2023    Foot Exam 01/12/2023       Future Appointments   Date Time Provider Department Center   1/13/2023 10:30 AM SARITA [facility]   1/30/2023 10:15 AM SARITA [facility]   4/12/2023  8:15 AM SARITA [facility]            Signature:  Manuela Mathis DNP      [address] [place] [address]    Date of encounter: 1/12/23

## 2023-01-13 ENCOUNTER — TELEPHONE (OUTPATIENT)
Dept: FAMILY MEDICINE | Facility: CLINIC | Age: 38
End: 2023-01-13
Payer: MEDICAID

## 2023-01-13 LAB — 25(OH)D3 SERPL-MCNC: 18 NG/ML

## 2023-01-13 NOTE — TELEPHONE ENCOUNTER
Spoke to patient today and gave labs and recommendation    Home health was there and spoke to them     Pt had not picked up meds yesterday but said they are going today to pickup

## 2023-01-13 NOTE — TELEPHONE ENCOUNTER
----- Message from Manuela Mathis DNP sent at 1/13/2023  5:37 AM CST -----  Needs a MVI daily   Iron otc 2 x day  But really bad renal failure and chf .. waiting on referrals   See back 2 weeks

## 2023-01-23 ENCOUNTER — PATIENT OUTREACH (OUTPATIENT)
Dept: FAMILY MEDICINE | Facility: CLINIC | Age: 38
End: 2023-01-23
Payer: MEDICAID

## 2023-01-30 ENCOUNTER — OFFICE VISIT (OUTPATIENT)
Dept: FAMILY MEDICINE | Facility: CLINIC | Age: 38
End: 2023-01-30
Payer: MEDICAID

## 2023-01-30 VITALS
OXYGEN SATURATION: 100 % | HEIGHT: 67 IN | WEIGHT: 244 LBS | HEART RATE: 90 BPM | TEMPERATURE: 97 F | SYSTOLIC BLOOD PRESSURE: 165 MMHG | DIASTOLIC BLOOD PRESSURE: 100 MMHG | BODY MASS INDEX: 38.3 KG/M2

## 2023-01-30 DIAGNOSIS — Z99.3 WHEELCHAIR DEPENDENT: ICD-10-CM

## 2023-01-30 DIAGNOSIS — Z23 NEED FOR VACCINATION: ICD-10-CM

## 2023-01-30 DIAGNOSIS — Z12.4 SCREENING FOR MALIGNANT NEOPLASM OF THE CERVIX: ICD-10-CM

## 2023-01-30 DIAGNOSIS — N64.4 BREAST PAIN: ICD-10-CM

## 2023-01-30 DIAGNOSIS — Z01.419 ENCNTR FOR GYN EXAM (GENERAL) (ROUTINE) W/O ABN FINDINGS: Primary | ICD-10-CM

## 2023-01-30 DIAGNOSIS — N64.52 DISCHARGE FROM BREAST: ICD-10-CM

## 2023-01-30 DIAGNOSIS — Z79.899 OTHER LONG TERM (CURRENT) DRUG THERAPY: ICD-10-CM

## 2023-01-30 LAB
CREAT UR-MCNC: 56 MG/DL (ref 28–219)
MICROALBUMIN UR-MCNC: 328 MG/DL (ref 0–2.8)
MICROALBUMIN/CREAT RATIO PNL UR: 5857.1 MG/G (ref 0–30)

## 2023-01-30 PROCEDURE — 3077F SYST BP >= 140 MM HG: CPT | Mod: ,,, | Performed by: NURSE PRACTITIONER

## 2023-01-30 PROCEDURE — 3077F PR MOST RECENT SYSTOLIC BLOOD PRESSURE >= 140 MM HG: ICD-10-PCS | Mod: ,,, | Performed by: NURSE PRACTITIONER

## 2023-01-30 PROCEDURE — 3008F PR BODY MASS INDEX (BMI) DOCUMENTED: ICD-10-PCS | Mod: ,,, | Performed by: NURSE PRACTITIONER

## 2023-01-30 PROCEDURE — 99213 OFFICE O/P EST LOW 20 MIN: CPT | Mod: ,,, | Performed by: NURSE PRACTITIONER

## 2023-01-30 PROCEDURE — 99213 PR OFFICE/OUTPT VISIT, EST, LEVL III, 20-29 MIN: ICD-10-PCS | Mod: ,,, | Performed by: NURSE PRACTITIONER

## 2023-01-30 PROCEDURE — 90651 9VHPV VACCINE 2/3 DOSE IM: CPT | Mod: ,,, | Performed by: NURSE PRACTITIONER

## 2023-01-30 PROCEDURE — 87591 CHLAMYDIA/GC, PCR (THINPREP): ICD-10-PCS | Mod: ,,, | Performed by: CLINICAL MEDICAL LABORATORY

## 2023-01-30 PROCEDURE — 3008F BODY MASS INDEX DOCD: CPT | Mod: ,,, | Performed by: NURSE PRACTITIONER

## 2023-01-30 PROCEDURE — 82570 MICROALBUMIN / CREATININE RATIO URINE: ICD-10-PCS | Mod: ,,, | Performed by: CLINICAL MEDICAL LABORATORY

## 2023-01-30 PROCEDURE — 88142 CYTOPATH C/V THIN LAYER: CPT | Mod: TC,GCY | Performed by: NURSE PRACTITIONER

## 2023-01-30 PROCEDURE — 90651 HPV VACCINE 9-VALENT 3 DOSE IM: ICD-10-PCS | Mod: ,,, | Performed by: NURSE PRACTITIONER

## 2023-01-30 PROCEDURE — 87491 CHLAMYDIA/GC, PCR (THINPREP): ICD-10-PCS | Mod: ,,, | Performed by: CLINICAL MEDICAL LABORATORY

## 2023-01-30 PROCEDURE — 82043 UR ALBUMIN QUANTITATIVE: CPT | Mod: ,,, | Performed by: CLINICAL MEDICAL LABORATORY

## 2023-01-30 PROCEDURE — 82570 ASSAY OF URINE CREATININE: CPT | Mod: ,,, | Performed by: CLINICAL MEDICAL LABORATORY

## 2023-01-30 PROCEDURE — 87591 N.GONORRHOEAE DNA AMP PROB: CPT | Mod: ,,, | Performed by: CLINICAL MEDICAL LABORATORY

## 2023-01-30 PROCEDURE — 87491 CHLMYD TRACH DNA AMP PROBE: CPT | Mod: ,,, | Performed by: CLINICAL MEDICAL LABORATORY

## 2023-01-30 PROCEDURE — 82043 MICROALBUMIN / CREATININE RATIO URINE: ICD-10-PCS | Mod: ,,, | Performed by: CLINICAL MEDICAL LABORATORY

## 2023-01-30 RX ORDER — INSULIN GLARGINE 100 [IU]/ML
15 INJECTION, SOLUTION SUBCUTANEOUS NIGHTLY
Qty: 13.5 ML | Refills: 3 | Status: SHIPPED | OUTPATIENT
Start: 2023-01-30 | End: 2023-05-15 | Stop reason: SDUPTHER

## 2023-01-30 RX ORDER — METOPROLOL SUCCINATE 50 MG/1
50 TABLET, EXTENDED RELEASE ORAL DAILY
Qty: 30 TABLET | Refills: 11 | Status: SHIPPED | OUTPATIENT
Start: 2023-01-30 | End: 2023-05-15

## 2023-01-30 RX ORDER — SACUBITRIL AND VALSARTAN 49; 51 MG/1; MG/1
1 TABLET, FILM COATED ORAL 2 TIMES DAILY
Qty: 30 TABLET | Refills: 5 | Status: SHIPPED | OUTPATIENT
Start: 2023-01-30 | End: 2023-05-15

## 2023-01-30 NOTE — PROGRESS NOTES
"SUBJECTIVE:   37 y.o. female for annual routine Pap and checkup.    Denies urinary ss  Sister +ovarian cancer 43yo.      Current Outpatient Medications   Medication Sig Dispense Refill    aspirin (ECOTRIN) 81 MG EC tablet Take 1 tablet (81 mg total) by mouth once daily. 30 tablet 11    atorvastatin (LIPITOR) 40 MG tablet Take 1 tablet (40 mg total) by mouth every evening. 90 tablet 1    sertraline (ZOLOFT) 25 MG tablet Take 1 tablet (25 mg total) by mouth once daily. 30 tablet 11    sodium bicarbonate 650 MG tablet Take 1 tablet (650 mg total) by mouth 2 (two) times daily. 180 tablet 1    insulin degludec (TRESIBA FLEXTOUCH U-200) 200 unit/mL (3 mL) insulin pen Inject 16 Units into the skin once daily. 3 pen 0    metoprolol succinate (TOPROL-XL) 50 MG 24 hr tablet Take 1 tablet (50 mg total) by mouth once daily. 30 tablet 11    sacubitriL-valsartan (ENTRESTO) 49-51 mg per tablet Take 1 tablet by mouth 2 (two) times daily. 30 tablet 5    warfarin (COUMADIN) 7.5 MG tablet Take 7.5 mg by mouth.       No current facility-administered medications for this visit.     Allergies: Patient has no known allergies.   Patient's last menstrual period was 2023 (approximate).    ROS:  Feeling well. No dyspnea or chest pain on exertion.  No abdominal pain, change in bowel habits, black or bloody stools.  No urinary tract symptoms. GYN ROS: normal menses, no abnormal bleeding, pelvic pain or discharge, no discharge or pelvic pain. No neurological complaints.    OBJECTIVE:   The patient appears well, alert, oriented x 3, in no distress.  BP (!) 165/100   Pulse 90   Temp 97.4 °F (36.3 °C)   Ht 5' 7" (1.702 m)   Wt 110.7 kg (244 lb)   LMP 2023 (Approximate)   SpO2 100%   BMI 38.22 kg/m²   ENT normal.  Neck supple. No adenopathy or thyromegaly. JAZMIN. Lungs are clear, good air entry, no wheezes, rhonchi or rales. S1 and S2 normal, no murmurs, regular rate and rhythm. Abdomen soft without tenderness, guarding, mass " or organomegaly. Extremities show no edema, normal peripheral pulses. Neurological is normal, no focal findings.    BREAST EXAM: symmetric fibrous changes in both upper outer quadrants, risk and benefit of breast self-exam was discussed, thick drainage noted to bilateral breast with tenderness    PELVIC EXAM: normal external genitalia, vulva, vagina, cervix, uterus and adnexa, VULVA: normal appearing vulva with no masses, tenderness or lesions, VAGINA: normal appearing vagina with normal color and discharge, no lesions, CERVIX: normal appearing cervix without discharge or lesions, multiparous os, endocervical polyp size 1 cm, UTERUS: uterus is normal size, shape, consistency and nontender, ADNEXA: normal adnexa in size, nontender and no masses, PAP: Pap smear done today, thin-prep method, exam chaperoned by NATASHA Bauer RN    ASSESSMENT:   well woman    PLAN:   Send for US and mammogram  pap smear q3 year  return annually or prn

## 2023-02-01 LAB
GH SERPL-MCNC: NORMAL NG/ML
INSULIN SERPL-ACNC: NORMAL U[IU]/ML
LAB AP CLINICAL INFORMATION: NORMAL
LAB AP GYN INTERPRETATION: NEGATIVE
LAB AP PAP DISCLAIMER COMMENTS: NORMAL
RENIN PLAS-CCNC: NORMAL NG/ML/H

## 2023-02-04 LAB
CHLAMYDIA BY PCR: NEGATIVE
N. GONORRHOEAE (GC) BY PCR: NEGATIVE

## 2023-02-08 ENCOUNTER — HOSPITAL ENCOUNTER (OUTPATIENT)
Dept: RADIOLOGY | Facility: HOSPITAL | Age: 38
Discharge: HOME | End: 2023-02-08
Attending: NURSE PRACTITIONER
Payer: MEDICAID

## 2023-02-08 DIAGNOSIS — N64.52 DISCHARGE FROM BREAST: ICD-10-CM

## 2023-02-08 DIAGNOSIS — N64.4 BREAST PAIN: ICD-10-CM

## 2023-02-08 PROCEDURE — 76641 ULTRASOUND BREAST COMPLETE: CPT | Mod: TC,RT

## 2023-02-08 PROCEDURE — 77066 DX MAMMO INCL CAD BI: CPT | Mod: TC

## 2023-03-09 ENCOUNTER — TELEPHONE (OUTPATIENT)
Dept: FAMILY MEDICINE | Facility: CLINIC | Age: 38
End: 2023-03-09
Payer: MEDICAID

## 2023-03-09 ENCOUNTER — EXTERNAL HOME HEALTH (OUTPATIENT)
Dept: HOME HEALTH SERVICES | Facility: HOSPITAL | Age: 38
End: 2023-03-09
Payer: MEDICAID

## 2023-03-09 NOTE — TELEPHONE ENCOUNTER
----- Message from Zekeiah Ormond sent at 3/8/2023  3:21 PM CST -----  Patient asked for a call back. Her call back is [phone].

## 2023-04-10 PROBLEM — N18.9 ACUTE KIDNEY INJURY SUPERIMPOSED ON CKD: Status: RESOLVED | Noted: 2021-03-19 | Resolved: 2023-04-10

## 2023-04-10 PROBLEM — N17.9 ACUTE KIDNEY INJURY SUPERIMPOSED ON CKD: Status: RESOLVED | Noted: 2021-03-19 | Resolved: 2023-04-10

## 2023-04-11 ENCOUNTER — TELEPHONE (OUTPATIENT)
Dept: FAMILY MEDICINE | Facility: CLINIC | Age: 38
End: 2023-04-11
Payer: MEDICAID

## 2023-04-11 NOTE — TELEPHONE ENCOUNTER
Called pt about eye exam no answer left vm     Called Dr Cifuentes office at [facility] Phone: [phone] last seen in 2020   They will be faxing on that visit to you

## 2023-04-11 NOTE — TELEPHONE ENCOUNTER
Pt called to make sure she is coming for appt tomorrow   She needs to come so we can do her wheelchair evaluation

## 2023-04-13 ENCOUNTER — PATIENT OUTREACH (OUTPATIENT)
Dept: FAMILY MEDICINE | Facility: CLINIC | Age: 38
End: 2023-04-13
Payer: MEDICAID

## 2023-04-17 PROBLEM — I21.4 NSTEMI (NON-ST ELEVATED MYOCARDIAL INFARCTION): Status: RESOLVED | Noted: 2023-01-12 | Resolved: 2023-04-17

## 2023-04-26 ENCOUNTER — TELEPHONE (OUTPATIENT)
Dept: FAMILY MEDICINE | Facility: CLINIC | Age: 38
End: 2023-04-26
Payer: MEDICAID

## 2023-04-26 NOTE — TELEPHONE ENCOUNTER
Make sure she had everything else in care gaps taking care of. And get her scheduled for 2-3 weeks frm now

## 2023-04-26 NOTE — TELEPHONE ENCOUNTER
Patient stated she finally got her a place in the apartments next door and sorry she has missed her appts but she did not have a ride.  She does have someone now that is willing to help her and wanted to know when she can come for her wheelchair evaluation she missed     She missed her appt with Dr Camacho and she is going to call her office to r/s

## 2023-04-28 ENCOUNTER — TELEPHONE (OUTPATIENT)
Dept: OBSTETRICS AND GYNECOLOGY | Facility: CLINIC | Age: 38
End: 2023-04-28
Payer: MEDICAID

## 2023-05-08 ENCOUNTER — TELEPHONE (OUTPATIENT)
Dept: OBSTETRICS AND GYNECOLOGY | Facility: CLINIC | Age: 38
End: 2023-05-08
Payer: MEDICAID

## 2023-05-10 ENCOUNTER — TELEPHONE (OUTPATIENT)
Dept: OBSTETRICS AND GYNECOLOGY | Facility: CLINIC | Age: 38
End: 2023-05-10
Payer: MEDICAID

## 2023-05-15 ENCOUNTER — OFFICE VISIT (OUTPATIENT)
Dept: FAMILY MEDICINE | Facility: CLINIC | Age: 38
End: 2023-05-15
Payer: MEDICAID

## 2023-05-15 VITALS
WEIGHT: 251 LBS | HEART RATE: 86 BPM | BODY MASS INDEX: 39.39 KG/M2 | HEIGHT: 67 IN | DIASTOLIC BLOOD PRESSURE: 94 MMHG | SYSTOLIC BLOOD PRESSURE: 172 MMHG | OXYGEN SATURATION: 100 %

## 2023-05-15 DIAGNOSIS — Z99.3 WHEELCHAIR DEPENDENT: ICD-10-CM

## 2023-05-15 DIAGNOSIS — Z13.6 ENCOUNTER FOR SCREENING FOR CARDIOVASCULAR DISORDERS: ICD-10-CM

## 2023-05-15 DIAGNOSIS — N18.4 STAGE 4 CHRONIC KIDNEY DISEASE: ICD-10-CM

## 2023-05-15 DIAGNOSIS — I10 ESSENTIAL HYPERTENSION, BENIGN: ICD-10-CM

## 2023-05-15 DIAGNOSIS — Z23 IMMUNIZATION DUE: ICD-10-CM

## 2023-05-15 DIAGNOSIS — N92.0 MENORRHAGIA WITH REGULAR CYCLE: ICD-10-CM

## 2023-05-15 DIAGNOSIS — I50.9 CHRONIC CONGESTIVE HEART FAILURE, UNSPECIFIED HEART FAILURE TYPE: Primary | ICD-10-CM

## 2023-05-15 DIAGNOSIS — E11.9 TYPE 2 DIABETES MELLITUS WITHOUT COMPLICATION, WITHOUT LONG-TERM CURRENT USE OF INSULIN: ICD-10-CM

## 2023-05-15 LAB
ALBUMIN SERPL BCP-MCNC: 3 G/DL (ref 3.5–5)
ALBUMIN/GLOB SERPL: 0.7 {RATIO}
ALP SERPL-CCNC: 128 U/L (ref 37–98)
ALT SERPL W P-5'-P-CCNC: 14 U/L (ref 13–56)
ANION GAP SERPL CALCULATED.3IONS-SCNC: 13 MMOL/L (ref 7–16)
AST SERPL W P-5'-P-CCNC: 10 U/L (ref 15–37)
BASOPHILS # BLD AUTO: 0.04 K/UL (ref 0–0.2)
BASOPHILS NFR BLD AUTO: 0.6 % (ref 0–1)
BILIRUB SERPL-MCNC: 0.3 MG/DL (ref ?–1.2)
BUN SERPL-MCNC: 45 MG/DL (ref 7–18)
BUN/CREAT SERPL: 13 (ref 6–20)
CALCIUM SERPL-MCNC: 8.3 MG/DL (ref 8.5–10.1)
CHLORIDE SERPL-SCNC: 105 MMOL/L (ref 98–107)
CHOLEST SERPL-MCNC: 295 MG/DL (ref 0–200)
CHOLEST/HDLC SERPL: 5.9 {RATIO}
CO2 SERPL-SCNC: 17 MMOL/L (ref 21–32)
CREAT SERPL-MCNC: 3.34 MG/DL (ref 0.55–1.02)
DIFFERENTIAL METHOD BLD: ABNORMAL
EGFR (NO RACE VARIABLE) (RUSH/TITUS): 17 ML/MIN/1.73M2
EOSINOPHIL # BLD AUTO: 0.07 K/UL (ref 0–0.5)
EOSINOPHIL NFR BLD AUTO: 1 % (ref 1–4)
ERYTHROCYTE [DISTWIDTH] IN BLOOD BY AUTOMATED COUNT: 14.7 % (ref 11.5–14.5)
EST. AVERAGE GLUCOSE BLD GHB EST-MCNC: 247 MG/DL
GLOBULIN SER-MCNC: 4.5 G/DL (ref 2–4)
GLUCOSE SERPL-MCNC: 381 MG/DL (ref 74–106)
HBA1C MFR BLD HPLC: 10 % (ref 4.5–6.6)
HCT VFR BLD AUTO: 27.4 % (ref 38–47)
HDLC SERPL-MCNC: 50 MG/DL (ref 40–60)
HGB BLD-MCNC: 8.1 G/DL (ref 12–16)
IMM GRANULOCYTES # BLD AUTO: 0.03 K/UL (ref 0–0.04)
IMM GRANULOCYTES NFR BLD: 0.4 % (ref 0–0.4)
LDLC SERPL CALC-MCNC: 212 MG/DL
LDLC/HDLC SERPL: 4.2 {RATIO}
LYMPHOCYTES # BLD AUTO: 1.47 K/UL (ref 1–4.8)
LYMPHOCYTES NFR BLD AUTO: 21.2 % (ref 27–41)
MCH RBC QN AUTO: 24.4 PG (ref 27–31)
MCHC RBC AUTO-ENTMCNC: 29.6 G/DL (ref 32–36)
MCV RBC AUTO: 82.5 FL (ref 80–96)
MONOCYTES # BLD AUTO: 0.38 K/UL (ref 0–0.8)
MONOCYTES NFR BLD AUTO: 5.5 % (ref 2–6)
MPC BLD CALC-MCNC: 11.5 FL (ref 9.4–12.4)
NEUTROPHILS # BLD AUTO: 4.93 K/UL (ref 1.8–7.7)
NEUTROPHILS NFR BLD AUTO: 71.3 % (ref 53–65)
NONHDLC SERPL-MCNC: 245 MG/DL
NRBC # BLD AUTO: 0 X10E3/UL
NRBC, AUTO (.00): 0 %
NT-PROBNP SERPL-MCNC: 5045 PG/ML (ref 1–125)
PLATELET # BLD AUTO: 431 K/UL (ref 150–400)
POTASSIUM SERPL-SCNC: 5.9 MMOL/L (ref 3.5–5.1)
PROT SERPL-MCNC: 7.5 G/DL (ref 6.4–8.2)
RBC # BLD AUTO: 3.32 M/UL (ref 4.2–5.4)
SODIUM SERPL-SCNC: 129 MMOL/L (ref 136–145)
TRIGL SERPL-MCNC: 166 MG/DL (ref 35–150)
VLDLC SERPL-MCNC: 33 MG/DL
WBC # BLD AUTO: 6.92 K/UL (ref 4.5–11)

## 2023-05-15 PROCEDURE — 80061 LIPID PANEL: ICD-10-PCS | Mod: ,,, | Performed by: CLINICAL MEDICAL LABORATORY

## 2023-05-15 PROCEDURE — 90471 IMMUNIZATION ADMIN: CPT | Mod: ,,, | Performed by: NURSE PRACTITIONER

## 2023-05-15 PROCEDURE — 83036 HEMOGLOBIN GLYCOSYLATED A1C: CPT | Mod: ,,, | Performed by: CLINICAL MEDICAL LABORATORY

## 2023-05-15 PROCEDURE — 99214 PR OFFICE/OUTPT VISIT, EST, LEVL IV, 30-39 MIN: ICD-10-PCS | Mod: 25,,, | Performed by: NURSE PRACTITIONER

## 2023-05-15 PROCEDURE — 3008F BODY MASS INDEX DOCD: CPT | Mod: ,,, | Performed by: NURSE PRACTITIONER

## 2023-05-15 PROCEDURE — 90651 9VHPV VACCINE 2/3 DOSE IM: CPT | Mod: ,,, | Performed by: NURSE PRACTITIONER

## 2023-05-15 PROCEDURE — 3077F SYST BP >= 140 MM HG: CPT | Mod: ,,, | Performed by: NURSE PRACTITIONER

## 2023-05-15 PROCEDURE — 83880 NT-PRO NATRIURETIC PEPTIDE: ICD-10-PCS | Mod: ,,, | Performed by: CLINICAL MEDICAL LABORATORY

## 2023-05-15 PROCEDURE — 80061 LIPID PANEL: CPT | Mod: ,,, | Performed by: CLINICAL MEDICAL LABORATORY

## 2023-05-15 PROCEDURE — 99214 OFFICE O/P EST MOD 30 MIN: CPT | Mod: 25,,, | Performed by: NURSE PRACTITIONER

## 2023-05-15 PROCEDURE — 80053 COMPREHENSIVE METABOLIC PANEL: ICD-10-PCS | Mod: ,,, | Performed by: CLINICAL MEDICAL LABORATORY

## 2023-05-15 PROCEDURE — 83036 HEMOGLOBIN A1C: ICD-10-PCS | Mod: ,,, | Performed by: CLINICAL MEDICAL LABORATORY

## 2023-05-15 PROCEDURE — 90471 HPV VACCINE 9-VALENT 3 DOSE IM: ICD-10-PCS | Mod: ,,, | Performed by: NURSE PRACTITIONER

## 2023-05-15 PROCEDURE — 3077F PR MOST RECENT SYSTOLIC BLOOD PRESSURE >= 140 MM HG: ICD-10-PCS | Mod: ,,, | Performed by: NURSE PRACTITIONER

## 2023-05-15 PROCEDURE — 80053 COMPREHEN METABOLIC PANEL: CPT | Mod: ,,, | Performed by: CLINICAL MEDICAL LABORATORY

## 2023-05-15 PROCEDURE — 85025 COMPLETE CBC W/AUTO DIFF WBC: CPT | Mod: ,,, | Performed by: CLINICAL MEDICAL LABORATORY

## 2023-05-15 PROCEDURE — 85025 CBC WITH DIFFERENTIAL: ICD-10-PCS | Mod: ,,, | Performed by: CLINICAL MEDICAL LABORATORY

## 2023-05-15 PROCEDURE — 3008F PR BODY MASS INDEX (BMI) DOCUMENTED: ICD-10-PCS | Mod: ,,, | Performed by: NURSE PRACTITIONER

## 2023-05-15 PROCEDURE — 83880 ASSAY OF NATRIURETIC PEPTIDE: CPT | Mod: ,,, | Performed by: CLINICAL MEDICAL LABORATORY

## 2023-05-15 PROCEDURE — 90651 HPV VACCINE 9-VALENT 3 DOSE IM: ICD-10-PCS | Mod: ,,, | Performed by: NURSE PRACTITIONER

## 2023-05-15 RX ORDER — AMLODIPINE BESYLATE 5 MG/1
5 TABLET ORAL
COMMUNITY
Start: 2023-04-25 | End: 2023-05-15

## 2023-05-15 RX ORDER — SODIUM BICARBONATE 650 MG/1
650 TABLET ORAL 2 TIMES DAILY
Qty: 180 TABLET | Refills: 1 | Status: SHIPPED | OUTPATIENT
Start: 2023-05-15 | End: 2024-05-14

## 2023-05-15 RX ORDER — SERTRALINE HYDROCHLORIDE 50 MG/1
50 TABLET, FILM COATED ORAL DAILY
Qty: 90 TABLET | Refills: 1 | Status: SHIPPED | OUTPATIENT
Start: 2023-05-15 | End: 2024-05-14

## 2023-05-15 RX ORDER — INSULIN GLARGINE 100 [IU]/ML
18 INJECTION, SOLUTION SUBCUTANEOUS NIGHTLY
Qty: 16.2 ML | Refills: 3 | Status: SHIPPED | OUTPATIENT
Start: 2023-05-15 | End: 2024-05-14

## 2023-05-15 RX ORDER — ATORVASTATIN CALCIUM 40 MG/1
40 TABLET, FILM COATED ORAL NIGHTLY
Qty: 90 TABLET | Refills: 1 | Status: SHIPPED | OUTPATIENT
Start: 2023-05-15

## 2023-05-15 RX ORDER — FAMOTIDINE 20 MG/1
20 TABLET, FILM COATED ORAL
COMMUNITY
Start: 2023-04-25 | End: 2023-05-15

## 2023-05-15 RX ORDER — SACUBITRIL AND VALSARTAN 97; 103 MG/1; MG/1
1 TABLET, FILM COATED ORAL 2 TIMES DAILY
Qty: 180 TABLET | Refills: 3 | Status: SHIPPED | OUTPATIENT
Start: 2023-05-15

## 2023-05-15 RX ORDER — ASPIRIN 81 MG/1
81 TABLET ORAL DAILY
Qty: 30 TABLET | Refills: 11 | Status: SHIPPED | OUTPATIENT
Start: 2023-05-15 | End: 2024-05-14

## 2023-05-15 RX ORDER — CARVEDILOL 6.25 MG/1
6.25 TABLET ORAL 2 TIMES DAILY
COMMUNITY
Start: 2023-04-25 | End: 2023-05-15

## 2023-05-15 RX ORDER — METOPROLOL SUCCINATE 100 MG/1
100 TABLET, EXTENDED RELEASE ORAL DAILY
Qty: 90 TABLET | Refills: 1 | Status: SHIPPED | OUTPATIENT
Start: 2023-05-15 | End: 2024-05-14

## 2023-05-15 RX ORDER — WARFARIN 7.5 MG/1
7.5 TABLET ORAL DAILY
Qty: 90 TABLET | Refills: 1 | Status: SHIPPED | OUTPATIENT
Start: 2023-05-15 | End: 2024-05-14

## 2023-05-15 NOTE — PROGRESS NOTES
Manuela Mathis DNP   [address] [place] [address]     PATIENT NAME: Yolie Egan  : 1985  DATE: 5/15/23  MRN: [id number]      Billing Provider: Manuela Mathis DNP  Level of Service:   Patient PCP Information       Provider PCP Type    Manuela Mathis DNP General            Reason for Visit / Chief Complaint: Follow-up, Diabetes, Hypertension, and wheelchair powered      Update PCP  Update Chief Complaint         History of Present Illness / Problem Focused Workflow     Yolie Egan presents to the clinic with Follow-up, Diabetes, Hypertension, and wheelchair     HPI    Review of Systems     Review of Systems     Medical / Social / Family History     Past Medical History:   Diagnosis Date    Acute cystitis 3/27/2021    Diabetes mellitus     type II    Diabetic ketoacidosis without coma associated with type 2 diabetes mellitus 3/13/2021    HTN (hypertension) 3/19/2021    Hyperkalemia 2021    Hypertension     Ketoacidosis due to type 2 diabetes mellitus     Morbid obesity     Symptomatic anemia        Past Surgical History:   Procedure Laterality Date    abcess of buttocks      CLOSURE OF WOUND Left 3/30/2021    Procedure: CLOSURE, WOUND;  Surgeon: Sebas Shea DO;  Location: [facility] OR;  Service: General;  Laterality: Left;    COLON SURGERY      ILEOSTOMY CLOSURE N/A 2021    Procedure: CLOSURE, ILEOSTOMY;  Surgeon: Sebas Shea DO;  Location: [facility];  Service: General;  Laterality: N/A;    wound debribement         Social History  Ms.  reports that she quit smoking about 6 months ago. Her smoking use included cigars. She started smoking about 23 years ago. She has never used smokeless tobacco. She reports that she does not drink alcohol and does not use drugs.    Family History  Ms.'s family history includes Diabetes in her father and mother; Heart disease in her father and mother; Hypertension in her father and mother; Kidney failure in her  "mother.    Medications and Allergies     Medications  Outpatient Medications Marked as Taking for the 5/15/23 encounter (Office Visit) with Manuela Mathis DNP   Medication Sig Dispense Refill    amLODIPine (NORVASC) 5 MG tablet Take 5 mg by mouth.      aspirin (ECOTRIN) 81 MG EC tablet Take 1 tablet (81 mg total) by mouth once daily. 30 tablet 11    atorvastatin (LIPITOR) 40 MG tablet Take 1 tablet (40 mg total) by mouth every evening. 90 tablet 1    insulin (BASAGLAR KWIKPEN U-100 INSULIN) glargine 100 units/mL SubQ pen Inject 15 Units into the skin every evening. 13.5 mL 3    metoprolol succinate (TOPROL-XL) 50 MG 24 hr tablet Take 1 tablet (50 mg total) by mouth once daily. 30 tablet 11    sertraline (ZOLOFT) 25 MG tablet Take 1 tablet (25 mg total) by mouth once daily. 30 tablet 11    sodium bicarbonate 650 MG tablet Take 1 tablet (650 mg total) by mouth 2 (two) times daily. 180 tablet 1    warfarin (COUMADIN) 7.5 MG tablet Take 7.5 mg by mouth.      [DISCONTINUED] sacubitriL-valsartan (ENTRESTO) 49-51 mg per tablet Take 1 tablet by mouth 2 (two) times daily. 30 tablet 5       Allergies  Review of patient's allergies indicates:  No Known Allergies    Physical Examination   BP (!) 172/94   Pulse 86   Ht 5' 7" (1.702 m)   Wt 113.9 kg (251 lb)   LMP 04/05/2023   SpO2 100%   BMI 39.31 kg/m²    Physical Exam  Vitals and nursing note reviewed.   Constitutional:       Appearance: Normal appearance. She is obese.   HENT:      Head: Normocephalic.      Nose: Nose normal.      Mouth/Throat:      Mouth: Mucous membranes are moist.   Eyes:      Pupils: Pupils are equal, round, and reactive to light.   Cardiovascular:      Rate and Rhythm: Normal rate and regular rhythm.      Pulses: Normal pulses.      Heart sounds: Murmur heard.   Pulmonary:      Effort: Pulmonary effort is normal.      Breath sounds: Normal breath sounds.   Abdominal:      General: Abdomen is flat. Bowel sounds are normal.      Palpations: " Abdomen is soft.   Musculoskeletal:         General: Deformity present.      Cervical back: Normal range of motion and neck supple.      Comments:   In wheelchair she borrowed. Sits in chair all day. Difficulty with transfer as her left side is significantly weak. +foot drop also. Uses right side of body to assist left side of body.    Skin:     General: Skin is warm and dry.      Capillary Refill: Capillary refill takes less than 2 seconds.      Findings: Lesion present.      Comments: Scarring and keloid to mid back from hospital abscess removal.   Neurological:      General: No focal deficit present.      Mental Status: She is alert and oriented to person, place, and time. Mental status is at baseline.   Psychiatric:         Mood and Affect: Mood normal.         Behavior: Behavior normal.         Thought Content: Thought content normal.         Judgment: Judgment normal.        Assessment and Plan (including Health Maintenance)      Problem List  Smart Sets  Document Outside HM   :    Plan:   Here to reestablish care  Has been in [facility] off and on since May 2022  Says shes had CHF Nstemi CVA left side residual In manual wheelchair she borrowed since leaving hospital  Lives alone now in assisted living facility. On bottom floor.      pap 1/2023  Pcv 1/2023  Tdap 1/2023  2nd HPV today - 4/2023     Labs today      Eye exam -for [facility] to schedule when she has pt eval for wheelchair mobility exam     Foot exam 1/2023  urine micro 1/2023    Ms Egan cant use a walker or cane safely due to left sided residual weakness from CVA. She is non-ambulatory and sits in wheelchair all day. Patient is able to pivot from bed to wheelchair since she has no family or roommate to assist her getting in and out of bed or wheelchair. She is at a high fall risk due to blindness in left eye and poor vision to right eye and is unable to transport her weight well due to left side weakness. She can not push herself in a manual  wheelchair due to hemiplegia of left side from CVA. She has since leaving hospital borrowed a manual wheelchair when she was discharged home with a friend unable to walk after months of inpatient hospital stay. She has chronic congestive heart failure, hypertension, insulin dependent Type 2 Diabetes, and chronic Stage 4 kidney disease in which she is unable to push herself manually more than a few minutes before tiring and fatigue sets in. She would not be able to safely transfer on and off a scooter and her left sided weakness would prevent operating the steering feature safely. She sits in a manual wheelchair most of the day in one spot. She is in dire need of a power wheelchair to give her some independence and freedom to move about in her apartment and to the doctors office in the building next to her apartment. She is very alert and would love to be able to be more active and independent. This power wheelchair will give her the freedom she needs to complete her own ADL's. The wheelchair will need special cushioned seating and controls on the right side on the wheelchair due to her left sided weakness from CVA.     Pt/ot eval for mobility [facility] eval with therapist [facility].      Increased zoloft, metoprolol and entresto.   Increased insulin to 18 units bedtime    See back 1 week repeat bp check and check bs log.   Routine appt 3 months        There are no preventive care reminders to display for this patient.    Problem List Items Addressed This Visit    None      Health Maintenance Topics with due status: Not Due       Topic Last Completion Date    TETANUS VACCINE 01/12/2023    Cervical Cancer Screening 01/30/2023       No future appointments.         Signature:  Manuela Mathis, SARITA      [address] [place] [address]    Date of encounter: 5/15/23

## 2023-07-19 ENCOUNTER — PATIENT MESSAGE (OUTPATIENT)
Dept: ADMINISTRATIVE | Facility: HOSPITAL | Age: 38
End: 2023-07-19

## 2023-08-14 PROBLEM — Z13.6 ENCOUNTER FOR SCREENING FOR CARDIOVASCULAR DISORDERS: Status: RESOLVED | Noted: 2023-01-12 | Resolved: 2023-08-14

## 2023-11-14 ENCOUNTER — HOSPITAL ENCOUNTER (EMERGENCY)
Facility: HOSPITAL | Age: 38
Discharge: HOME | End: 2023-11-15
Attending: EMERGENCY MEDICINE
Payer: MEDICAID

## 2023-11-14 DIAGNOSIS — N18.5 CKD (CHRONIC KIDNEY DISEASE) STAGE 5, GFR LESS THAN 15 ML/MIN: ICD-10-CM

## 2023-11-14 DIAGNOSIS — D25.9 UTERINE LEIOMYOMA, UNSPECIFIED LOCATION: Primary | ICD-10-CM

## 2023-11-14 LAB
ALBUMIN SERPL BCP-MCNC: 2.7 G/DL (ref 3.5–5)
ALBUMIN/GLOB SERPL: 0.5 {RATIO}
ALP SERPL-CCNC: 146 U/L (ref 37–98)
ALT SERPL W P-5'-P-CCNC: 16 U/L (ref 13–56)
ANION GAP SERPL CALCULATED.3IONS-SCNC: 17 MMOL/L (ref 7–16)
AST SERPL W P-5'-P-CCNC: 8 U/L (ref 15–37)
B-HCG UR QL: NEGATIVE
BACTERIA #/AREA URNS HPF: ABNORMAL /HPF
BASOPHILS # BLD AUTO: 0.03 K/UL (ref 0–0.2)
BASOPHILS NFR BLD AUTO: 0.8 % (ref 0–1)
BILIRUB SERPL-MCNC: 0.2 MG/DL (ref ?–1.2)
BILIRUB UR QL STRIP: NEGATIVE
BUN SERPL-MCNC: 46 MG/DL (ref 7–18)
BUN/CREAT SERPL: 9 (ref 6–20)
CALCIUM SERPL-MCNC: 8.8 MG/DL (ref 8.5–10.1)
CHLORIDE SERPL-SCNC: 105 MMOL/L (ref 98–107)
CLARITY UR: ABNORMAL
CO2 SERPL-SCNC: 17 MMOL/L (ref 21–32)
COLOR UR: ABNORMAL
CREAT SERPL-MCNC: 5.16 MG/DL (ref 0.55–1.02)
CTP QC/QA: YES
DIFFERENTIAL METHOD BLD: ABNORMAL
EGFR (NO RACE VARIABLE) (RUSH/TITUS): 10 ML/MIN/1.73M2
EOSINOPHIL # BLD AUTO: 0.03 K/UL (ref 0–0.5)
EOSINOPHIL NFR BLD AUTO: 0.8 % (ref 1–4)
ERYTHROCYTE [DISTWIDTH] IN BLOOD BY AUTOMATED COUNT: 16.9 % (ref 11.5–14.5)
GLOBULIN SER-MCNC: 5 G/DL (ref 2–4)
GLUCOSE SERPL-MCNC: 245 MG/DL (ref 74–106)
GLUCOSE UR STRIP-MCNC: 500 MG/DL
HCT VFR BLD AUTO: 40 % (ref 38–47)
HGB BLD-MCNC: 12.5 G/DL (ref 12–16)
IMM GRANULOCYTES # BLD AUTO: 0.01 K/UL (ref 0–0.04)
IMM GRANULOCYTES NFR BLD: 0.3 % (ref 0–0.4)
INR BLD: 1.09
KETONES UR STRIP-SCNC: NEGATIVE MG/DL
LEUKOCYTE ESTERASE UR QL STRIP: ABNORMAL
LYMPHOCYTES # BLD AUTO: 0.84 K/UL (ref 1–4.8)
LYMPHOCYTES NFR BLD AUTO: 23.3 % (ref 27–41)
MCH RBC QN AUTO: 24.4 PG (ref 27–31)
MCHC RBC AUTO-ENTMCNC: 31.3 G/DL (ref 32–36)
MCV RBC AUTO: 78.1 FL (ref 80–96)
MONOCYTES # BLD AUTO: 0.17 K/UL (ref 0–0.8)
MONOCYTES NFR BLD AUTO: 4.7 % (ref 2–6)
MPC BLD CALC-MCNC: 10.8 FL (ref 9.4–12.4)
NEUTROPHILS # BLD AUTO: 2.53 K/UL (ref 1.8–7.7)
NEUTROPHILS NFR BLD AUTO: 70.1 % (ref 53–65)
NITRITE UR QL STRIP: NEGATIVE
NRBC # BLD AUTO: 0 X10E3/UL
NRBC, AUTO (.00): 0 %
PH UR STRIP: 6 PH UNITS
PLATELET # BLD AUTO: 246 K/UL (ref 150–400)
POTASSIUM SERPL-SCNC: 4.8 MMOL/L (ref 3.5–5.1)
PROT SERPL-MCNC: 7.7 G/DL (ref 6.4–8.2)
PROT UR QL STRIP: 600
PROTHROMBIN TIME: 14 SECONDS (ref 11.7–14.7)
RBC # BLD AUTO: 5.12 M/UL (ref 4.2–5.4)
RBC # UR STRIP: ABNORMAL /UL
RBC #/AREA URNS HPF: >182 /HPF
SODIUM SERPL-SCNC: 134 MMOL/L (ref 136–145)
SP GR UR STRIP: 1.01
SQUAMOUS #/AREA URNS LPF: ABNORMAL /HPF
UROBILINOGEN UR STRIP-ACNC: NORMAL MG/DL
WBC # BLD AUTO: 3.61 K/UL (ref 4.5–11)
WBC #/AREA URNS HPF: 37 /HPF

## 2023-11-14 PROCEDURE — 85610 PROTHROMBIN TIME: CPT | Performed by: EMERGENCY MEDICINE

## 2023-11-14 PROCEDURE — 99284 PR EMERGENCY DEPT VISIT,LEVEL IV: ICD-10-PCS | Mod: ,,, | Performed by: EMERGENCY MEDICINE

## 2023-11-14 PROCEDURE — 81001 URINALYSIS AUTO W/SCOPE: CPT | Performed by: EMERGENCY MEDICINE

## 2023-11-14 PROCEDURE — 96361 HYDRATE IV INFUSION ADD-ON: CPT

## 2023-11-14 PROCEDURE — 63600175 PHARM REV CODE 636 W HCPCS: Performed by: EMERGENCY MEDICINE

## 2023-11-14 PROCEDURE — 87086 URINE CULTURE/COLONY COUNT: CPT | Performed by: EMERGENCY MEDICINE

## 2023-11-14 PROCEDURE — 99284 EMERGENCY DEPT VISIT MOD MDM: CPT | Mod: ,,, | Performed by: EMERGENCY MEDICINE

## 2023-11-14 PROCEDURE — 96374 THER/PROPH/DIAG INJ IV PUSH: CPT

## 2023-11-14 PROCEDURE — 81025 URINE PREGNANCY TEST: CPT | Performed by: EMERGENCY MEDICINE

## 2023-11-14 PROCEDURE — 85025 COMPLETE CBC W/AUTO DIFF WBC: CPT | Performed by: NURSE PRACTITIONER

## 2023-11-14 PROCEDURE — 99285 EMERGENCY DEPT VISIT HI MDM: CPT | Mod: 25

## 2023-11-14 PROCEDURE — 25000003 PHARM REV CODE 250: Performed by: NURSE PRACTITIONER

## 2023-11-14 PROCEDURE — 80053 COMPREHEN METABOLIC PANEL: CPT | Performed by: NURSE PRACTITIONER

## 2023-11-14 RX ORDER — HYDRALAZINE HYDROCHLORIDE 20 MG/ML
10 INJECTION INTRAMUSCULAR; INTRAVENOUS
Status: COMPLETED | OUTPATIENT
Start: 2023-11-14 | End: 2023-11-14

## 2023-11-14 RX ADMIN — HYDRALAZINE HYDROCHLORIDE 10 MG: 20 INJECTION, SOLUTION INTRAMUSCULAR; INTRAVENOUS at 09:11

## 2023-11-14 RX ADMIN — SODIUM CHLORIDE 1000 ML: 9 INJECTION, SOLUTION INTRAVENOUS at 08:11

## 2023-11-14 NOTE — ED TRIAGE NOTES
Presents to ED via EMS from home for c/o vaginal bleeding that began yesterday, reports she has used 5 depends briefs.

## 2023-11-15 VITALS
WEIGHT: 250 LBS | OXYGEN SATURATION: 97 % | SYSTOLIC BLOOD PRESSURE: 177 MMHG | HEIGHT: 67 IN | BODY MASS INDEX: 39.24 KG/M2 | RESPIRATION RATE: 20 BRPM | DIASTOLIC BLOOD PRESSURE: 81 MMHG | HEART RATE: 92 BPM | TEMPERATURE: 98 F

## 2023-11-15 PROCEDURE — 63600175 PHARM REV CODE 636 W HCPCS: Performed by: EMERGENCY MEDICINE

## 2023-11-15 PROCEDURE — 96361 HYDRATE IV INFUSION ADD-ON: CPT

## 2023-11-15 PROCEDURE — 96375 TX/PRO/DX INJ NEW DRUG ADDON: CPT

## 2023-11-15 PROCEDURE — 25000003 PHARM REV CODE 250: Performed by: EMERGENCY MEDICINE

## 2023-11-15 RX ORDER — MORPHINE SULFATE 2 MG/ML
2 INJECTION, SOLUTION INTRAMUSCULAR; INTRAVENOUS
Status: COMPLETED | OUTPATIENT
Start: 2023-11-15 | End: 2023-11-15

## 2023-11-15 RX ADMIN — MORPHINE SULFATE 2 MG: 2 INJECTION, SOLUTION INTRAMUSCULAR; INTRAVENOUS at 12:11

## 2023-11-15 RX ADMIN — SODIUM CHLORIDE 1000 ML: 9 INJECTION, SOLUTION INTRAVENOUS at 12:11

## 2023-11-15 RX ADMIN — SODIUM CHLORIDE, POTASSIUM CHLORIDE, SODIUM LACTATE AND CALCIUM CHLORIDE 1000 ML: 600; 310; 30; 20 INJECTION, SOLUTION INTRAVENOUS at 12:11

## 2023-11-15 NOTE — ED NOTES
"Rec'vd report from JANNETH Santiago.  Pt awaiting to be seen per provider.  Pt lying in bed at this time and states she is having abdominal cramping.  States she started bleeding heavy yesterday.  When asked if she had seen a gynecologist she said no that she saw a primary physician and they have discussed a hysterectomy due to hx of painful mensus with heavy bleeding.  When questioned about being referred to a gynecologist about this she said "yes, I have seen one and they want to refer me to a specialist in [place] because of her hx of stroke" she states she is just hurting today.  "

## 2023-11-15 NOTE — FIRST PROVIDER EVALUATION
"Medical screening examination initiated.  I have conducted a focused provider triage encounter, findings are as follows:    Brief history of present illness:  Vaginal bleeding    Vitals:    11/14/23 1731   BP: (!) 167/94   Pulse: 94   Resp: 16   Temp: 98.7 °F (37.1 °C)   SpO2: 100%   Weight: 113.4 kg (250 lb)   Height: 5' 7" (1.702 m)       Pertinent physical exam:  Vaginal bleeding    Brief workup plan:  Initiate IV, 1 L normal saline bolus collect blood work collect urine specimen    Preliminary workup initiated; this workup will be continued and followed by the physician or advanced practice provider that is assigned to the patient when roomed.  "

## 2023-11-15 NOTE — ED PROVIDER NOTES
Encounter Date: 2023    SCRIBE #1 NOTE: I, Nany Santiago, am scribing for, and in the presence of,  Yuriy Ghosh MD. I have scribed the entire note.       History     Chief Complaint   Patient presents with    Vaginal Bleeding     37 year old female presents to the ED complaining of vaginal bleeding. Patient reports that she started experiencing heavy vaginal bleeding yesterday and notes abdominal cramping as well. Patient is positive for chills but denies any fever. Patient has a PMHx of hypertension and DM.    The history is provided by the patient. No  was used.     Review of patient's allergies indicates:  No Known Allergies  Past Medical History:   Diagnosis Date    Acute cystitis 3/27/2021    Diabetes mellitus     type II    Diabetic ketoacidosis without coma associated with type 2 diabetes mellitus 3/13/2021    HTN (hypertension) 3/19/2021    Hyperkalemia 2021    Hypertension     Ketoacidosis due to type 2 diabetes mellitus     Morbid obesity     Symptomatic anemia      Past Surgical History:   Procedure Laterality Date    abcess of buttocks      CLOSURE OF WOUND Left 3/30/2021    Procedure: CLOSURE, WOUND;  Surgeon: Sebas Shea DO;  Location: [facility] OR;  Service: General;  Laterality: Left;    COLON SURGERY      ILEOSTOMY CLOSURE N/A 2021    Procedure: CLOSURE, ILEOSTOMY;  Surgeon: Sebas Shea DO;  Location: [facility] OR;  Service: General;  Laterality: N/A;    wound debribement       Family History   Problem Relation Age of Onset    Heart disease Mother     Hypertension Mother     Diabetes Mother     Kidney failure Mother     Hypertension Father     Diabetes Father     Heart disease Father      Social History     Tobacco Use    Smoking status: Former     Types: Cigars     Start date: 2000     Quit date: 2022     Years since quittin.0    Smokeless tobacco: Never    Tobacco comments:     Black and Mild cigars 3-4 per day since age 15    Substance Use Topics    Alcohol use: Never    Drug use: Never     Review of Systems   Constitutional:  Positive for chills.   Gastrointestinal:  Positive for abdominal pain.   Genitourinary:  Positive for vaginal bleeding.       Physical Exam     Initial Vitals [11/14/23 1731]   BP Pulse Resp Temp SpO2   (!) 167/94 94 16 98.7 °F (37.1 °C) 100 %      MAP       --         Physical Exam    Nursing note and vitals reviewed.  Constitutional: She appears well-developed and well-nourished.   Cardiovascular:  Normal heart sounds.           Pulmonary/Chest: Breath sounds normal. No respiratory distress.   Abdominal: There is abdominal tenderness.     Neurological: She is alert and oriented to person, place, and time.         ED Course   Procedures  Labs Reviewed   URINALYSIS, REFLEX TO URINE CULTURE - Abnormal; Notable for the following components:       Result Value    Color, UA Pink (*)     Leukocytes, UA Trace (*)     Glucose,  (*)     Blood, UA Large (*)     All other components within normal limits   COMPREHENSIVE METABOLIC PANEL - Abnormal; Notable for the following components:    Sodium 134 (*)     CO2 17 (*)     Anion Gap 17 (*)     Glucose 245 (*)     BUN 46 (*)     Creatinine 5.16 (*)     Albumin 2.7 (*)     Globulin 5.0 (*)     Alk Phos 146 (*)     AST 8 (*)     eGFR 10 (*)     All other components within normal limits   CBC WITH DIFFERENTIAL - Abnormal; Notable for the following components:    WBC 3.61 (*)     MCV 78.1 (*)     MCH 24.4 (*)     MCHC 31.3 (*)     RDW 16.9 (*)     Neutrophils % 70.1 (*)     Lymphocytes % 23.3 (*)     Eosinophils % 0.8 (*)     Lymphocytes, Absolute 0.84 (*)     All other components within normal limits   URINALYSIS, MICROSCOPIC - Abnormal; Notable for the following components:    WBC, UA 37 (*)     RBC, UA >182 (*)     Bacteria, UA Many (*)     Squamous Epithelial Cells, UA Occasional (*)     All other components within normal limits   PROTIME-INR - Normal   CULTURE, URINE    CBC W/ AUTO DIFFERENTIAL    Narrative:     The following orders were created for panel order CBC auto differential.  Procedure                               Abnormality         Status                     ---------                               -----------         ------                     CBC with Differential[[id number]]       Abnormal            Final result                 Please view results for these tests on the individual orders.   EXTRA TUBES    Narrative:     The following orders were created for panel order EXTRA TUBES.  Procedure                               Abnormality         Status                     ---------                               -----------         ------                     Gold Top Hold[[id number]]                                   In process                   Please view results for these tests on the individual orders.   GOLD TOP HOLD   EXTRA TUBES    Narrative:     The following orders were created for panel order EXTRA TUBES.  Procedure                               Abnormality         Status                     ---------                               -----------         ------                     Light Blue Top Hold[[id number]]                                                        Light Green Top Hold[[id number]]                            In process                 Gold Top Hold[[id number]]                                   In process                   Please view results for these tests on the individual orders.   LIGHT GREEN TOP HOLD   GOLD TOP HOLD   EXTRA TUBES    Narrative:     The following orders were created for panel order EXTRA TUBES.  Procedure                               Abnormality         Status                     ---------                               -----------         ------                     Lavender Top Hold[[id number]]                               In process                   Please view results for these tests on the individual orders.   LAVENDER  TOP HOLD   POCT URINE PREGNANCY          Imaging Results              US Pelvis Complete Non OB (Edited Result - FINAL)  Result time 11/15/23 07:48:47      Addendum (preliminary) 1 of 1 by Aryan Romero MD (11/15/23 07:48:47)      This is a correction to the original report.  Transabdominal imaging only was performed.  Transvaginal images were not obtained      Electronically signed by: Aryan Romero  Date:    11/15/2023  Time:    07:48                 Final result by Aryan Romero MD (11/15/23 07:43:53)                   Impression:      Suspect uterine leiomyomata    Minimal prominence of the endometrial echo      Electronically signed by: Aryan Romero  Date:    11/15/2023  Time:    07:43               Narrative:    EXAMINATION:  US PELVIS COMPLETE NON OB    CLINICAL HISTORY:  Vaginal bleed;.    COMPARISON:  No previous similar    TECHNIQUE:  Real-time ultrasound images are captured and archived.  Transvaginal imaging only was performed.    FINDINGS:  There is no free fluid in the pelvis.    Right ovary measures 33 x 33 x 20 mm; left ovary measures 33 x 32 x 19 mm.  The ovaries appear normal transabdominally.  Each ovary has color Doppler flow.    The anteverted uterus measures 121 by 75 by 67 mm.  Endometrial echo is mildly prominent at 8 mm.  There are multiple mural masses within the uterus of mixed echogenicity.  These are nonspecific but are most likely related to urine leiomyomata.  The largest of these measures 3.6 x 3.3 x 3.2 cm.    Bladder is moderately distended and grossly unremarkable where seen.                                       Medications   sodium chloride 0.9% bolus 1,000 mL 1,000 mL (0 mLs Intravenous Stopped 11/14/23 2115)   hydrALAZINE injection 10 mg (10 mg Intravenous Given 11/14/23 2145)   sodium chloride 0.9% bolus 1,000 mL 1,000 mL (0 mLs Intravenous Stopped 11/15/23 0117)   lactated ringers bolus 1,000 mL (0 mLs Intravenous Stopped 11/15/23 0153)   morphine  injection 2 mg (2 mg Intravenous Given 11/15/23 0057)     Medical Decision Making  A 37-year-old female patient presented to the emergency department with vaginal bleeding and lower abdominal pain.  Physical examination revealed tenderness of the lower abdomen with recurrent vaginal bleed.  The workup of the patient revealed that she has fibroid and also she has stage 5 chronic kidney disease.  The patient informed and she needs to follow-up with OBGYN and a nephrologist.    Amount and/or Complexity of Data Reviewed  Labs: ordered. Decision-making details documented in ED Course.  Radiology: ordered. Decision-making details documented in ED Course.    Risk  Prescription drug management.              Attending Attestation:           Physician Attestation for Scribe:  Physician Attestation Statement for Scribe #1: I, Yuriy Ghosh MD, reviewed documentation, as scribed by Nany Santiago in my presence, and it is both accurate and complete.                             Clinical Impression:   Final diagnoses:  [D25.9] Uterine leiomyoma, unspecified location (Primary)  [N18.5] CKD (chronic kidney disease) stage 5, GFR less than 15 ml/min        ED Disposition Condition    Discharge Stable          ED Prescriptions    None       Follow-up Information       Follow up With Specialties Details Why Contact Info    Oscar Fernandez MD Nephrology   [address] [facility]  INTERNAL [facility]IC[facility] CLINIC  [facility] [address]  [phone]               Yruiy Ghosh MD  11/26/23 6413

## 2023-11-16 ENCOUNTER — TELEPHONE (OUTPATIENT)
Dept: EMERGENCY MEDICINE | Facility: HOSPITAL | Age: 38
End: 2023-11-16
Payer: MEDICAID

## 2023-11-16 LAB — UA COMPLETE W REFLEX CULTURE PNL UR: NORMAL

## 2024-07-01 RX ORDER — SODIUM BICARBONATE 650 MG/1
650 TABLET ORAL 2 TIMES DAILY
Qty: 180 TABLET | Refills: 1 | Status: SHIPPED | OUTPATIENT
Start: 2024-07-01 | End: 2025-07-01

## 2024-08-05 ENCOUNTER — HOSPITAL ENCOUNTER (EMERGENCY)
Facility: HOSPITAL | Age: 39
Discharge: HOME | End: 2024-08-06
Payer: MEDICAID

## 2024-08-05 VITALS
TEMPERATURE: 98 F | WEIGHT: 255 LBS | HEART RATE: 76 BPM | BODY MASS INDEX: 40.02 KG/M2 | HEIGHT: 67 IN | RESPIRATION RATE: 18 BRPM | SYSTOLIC BLOOD PRESSURE: 146 MMHG | DIASTOLIC BLOOD PRESSURE: 84 MMHG | OXYGEN SATURATION: 99 %

## 2024-08-05 DIAGNOSIS — L89.890 PRESSURE INJURY OF RIGHT FOOT, UNSTAGEABLE: Primary | ICD-10-CM

## 2024-08-05 LAB
ANION GAP SERPL CALCULATED.3IONS-SCNC: 14 MMOL/L (ref 7–16)
APTT PPP: 34.1 SECONDS (ref 25.2–37.3)
BASOPHILS # BLD AUTO: 0.03 K/UL (ref 0–0.2)
BASOPHILS NFR BLD AUTO: 0.5 % (ref 0–1)
BUN SERPL-MCNC: 64 MG/DL (ref 7–18)
BUN/CREAT SERPL: 7 (ref 6–20)
CALCIUM SERPL-MCNC: 7.5 MG/DL (ref 8.5–10.1)
CHLORIDE SERPL-SCNC: 103 MMOL/L (ref 98–107)
CO2 SERPL-SCNC: 22 MMOL/L (ref 21–32)
CREAT SERPL-MCNC: 8.78 MG/DL (ref 0.55–1.02)
CRP SERPL-MCNC: 0.37 MG/DL (ref 0–0.8)
DIFFERENTIAL METHOD BLD: ABNORMAL
EGFR (NO RACE VARIABLE) (RUSH/TITUS): 5 ML/MIN/1.73M2
EOSINOPHIL # BLD AUTO: 0.11 K/UL (ref 0–0.5)
EOSINOPHIL NFR BLD AUTO: 1.8 % (ref 1–4)
ERYTHROCYTE [DISTWIDTH] IN BLOOD BY AUTOMATED COUNT: 18.3 % (ref 11.5–14.5)
GLUCOSE SERPL-MCNC: 272 MG/DL (ref 74–106)
HCT VFR BLD AUTO: 32.3 % (ref 38–47)
HGB BLD-MCNC: 9.7 G/DL (ref 12–16)
IMM GRANULOCYTES # BLD AUTO: 0.01 K/UL (ref 0–0.04)
IMM GRANULOCYTES NFR BLD: 0.2 % (ref 0–0.4)
INR BLD: 1.1
LYMPHOCYTES # BLD AUTO: 1.43 K/UL (ref 1–4.8)
LYMPHOCYTES NFR BLD AUTO: 23.7 % (ref 27–41)
MCH RBC QN AUTO: 26.9 PG (ref 27–31)
MCHC RBC AUTO-ENTMCNC: 30 G/DL (ref 32–36)
MCV RBC AUTO: 89.5 FL (ref 80–96)
MONOCYTES # BLD AUTO: 0.44 K/UL (ref 0–0.8)
MONOCYTES NFR BLD AUTO: 7.3 % (ref 2–6)
MPC BLD CALC-MCNC: 10.4 FL (ref 9.4–12.4)
NEUTROPHILS # BLD AUTO: 4.02 K/UL (ref 1.8–7.7)
NEUTROPHILS NFR BLD AUTO: 66.5 % (ref 53–65)
NRBC # BLD AUTO: 0 X10E3/UL
NRBC, AUTO (.00): 0 %
PLATELET # BLD AUTO: 336 K/UL (ref 150–400)
POTASSIUM SERPL-SCNC: 5.1 MMOL/L (ref 3.5–5.1)
PROTHROMBIN TIME: 14.1 SECONDS (ref 11.7–14.7)
RBC # BLD AUTO: 3.61 M/UL (ref 4.2–5.4)
SODIUM SERPL-SCNC: 134 MMOL/L (ref 136–145)
WBC # BLD AUTO: 6.04 K/UL (ref 4.5–11)

## 2024-08-05 PROCEDURE — 96374 THER/PROPH/DIAG INJ IV PUSH: CPT

## 2024-08-05 PROCEDURE — 80048 BASIC METABOLIC PNL TOTAL CA: CPT | Performed by: NURSE PRACTITIONER

## 2024-08-05 PROCEDURE — 99285 EMERGENCY DEPT VISIT HI MDM: CPT | Mod: 25

## 2024-08-05 PROCEDURE — 85610 PROTHROMBIN TIME: CPT | Performed by: NURSE PRACTITIONER

## 2024-08-05 PROCEDURE — 96375 TX/PRO/DX INJ NEW DRUG ADDON: CPT

## 2024-08-05 PROCEDURE — 85025 COMPLETE CBC W/AUTO DIFF WBC: CPT | Performed by: NURSE PRACTITIONER

## 2024-08-05 PROCEDURE — 85730 THROMBOPLASTIN TIME PARTIAL: CPT | Performed by: NURSE PRACTITIONER

## 2024-08-05 PROCEDURE — 36415 COLL VENOUS BLD VENIPUNCTURE: CPT | Performed by: NURSE PRACTITIONER

## 2024-08-05 PROCEDURE — 86140 C-REACTIVE PROTEIN: CPT | Performed by: NURSE PRACTITIONER

## 2024-08-05 PROCEDURE — 96376 TX/PRO/DX INJ SAME DRUG ADON: CPT

## 2024-08-05 PROCEDURE — 63600175 PHARM REV CODE 636 W HCPCS: Performed by: NURSE PRACTITIONER

## 2024-08-05 RX ORDER — MORPHINE SULFATE 4 MG/ML
4 INJECTION, SOLUTION INTRAMUSCULAR; INTRAVENOUS
Status: COMPLETED | OUTPATIENT
Start: 2024-08-05 | End: 2024-08-05

## 2024-08-05 RX ORDER — ONDANSETRON HYDROCHLORIDE 2 MG/ML
4 INJECTION, SOLUTION INTRAVENOUS
Status: COMPLETED | OUTPATIENT
Start: 2024-08-05 | End: 2024-08-05

## 2024-08-05 RX ADMIN — ONDANSETRON 4 MG: 2 INJECTION INTRAMUSCULAR; INTRAVENOUS at 09:08

## 2024-08-05 RX ADMIN — MORPHINE SULFATE 4 MG: 4 INJECTION, SOLUTION INTRAMUSCULAR; INTRAVENOUS at 11:08

## 2024-08-05 RX ADMIN — MORPHINE SULFATE 4 MG: 4 INJECTION, SOLUTION INTRAMUSCULAR; INTRAVENOUS at 09:08

## 2024-08-05 RX ADMIN — ONDANSETRON 4 MG: 2 INJECTION INTRAMUSCULAR; INTRAVENOUS at 11:08

## 2024-08-06 NOTE — ED PROVIDER NOTES
Encounter Date: 8/5/2024       History     Chief Complaint   Patient presents with    Foot Pain     Pov to er - c/o chronic feet pain - states feet are black and painfull - seen at [facility] last week     39 y/o AAMARY presents to the emergency department with c/o painful blisters to her right foot. She states they are black. She states she first noticed them on yesterday. She went to outside facility where she was not pleased with her work up or treatment as she states they gave her a prescription for pain medication and sent her home. She did not get the pain medication filled. She states she is in significant pain and request something for pain at this time. She denies any fever or chills. She reports generally not feeling well. She is a dialysis patient. She is  a diabetic. She states she knows her blood sugar has been running high but has not checked it regularly. She has had no nausea or vomiting. She has strong, palpable pulses, DP, PT. There is no drainage, erythema or warmth.    The history is provided by the patient.     Review of patient's allergies indicates:  No Known Allergies  Past Medical History:   Diagnosis Date    Acute cystitis 3/27/2021    Diabetes mellitus     type II    Diabetic ketoacidosis without coma associated with type 2 diabetes mellitus 3/13/2021    HTN (hypertension) 3/19/2021    Hyperkalemia 4/11/2021    Hypertension     Ketoacidosis due to type 2 diabetes mellitus     Morbid obesity     Symptomatic anemia      Past Surgical History:   Procedure Laterality Date    abcess of buttocks      CLOSURE OF WOUND Left 3/30/2021    Procedure: CLOSURE, WOUND;  Surgeon: Sebas Shea DO;  Location: [facility] OR;  Service: General;  Laterality: Left;    COLON SURGERY      ILEOSTOMY CLOSURE N/A 4/16/2021    Procedure: CLOSURE, ILEOSTOMY;  Surgeon: Sebas Shea DO;  Location: [facility] OR;  Service: General;  Laterality: N/A;    wound debribement       Family History   Problem Relation Name Age of  Onset    Heart disease Mother      Hypertension Mother      Diabetes Mother      Kidney failure Mother      Hypertension Father      Diabetes Father      Heart disease Father       Social History     Tobacco Use    Smoking status: Former     Types: Cigars     Start date: 2000     Quit date: 2022     Years since quittin.7    Smokeless tobacco: Never    Tobacco comments:     Black and Mild cigars 3-4 per day since age 15   Substance Use Topics    Alcohol use: Never    Drug use: Never     Review of Systems    Physical Exam     Initial Vitals [24]   BP Pulse Resp Temp SpO2   (!) 169/99 84 18 98 °F (36.7 °C) 99 %      MAP       --         Physical Exam    Constitutional: She appears well-developed and well-nourished. She is Obese . She is cooperative.  Non-toxic appearance.   Cardiovascular:  Normal rate, regular rhythm and normal heart sounds.           Pulses:       Dorsalis pedis pulses are 2+ on the right side and 2+ on the left side.        Posterior tibial pulses are 2+ on the right side and 2+ on the left side.     Neurological: She is alert and oriented to person, place, and time. She has normal strength.   Skin: Skin is warm and dry. Capillary refill takes less than 2 seconds.   There are two dark areas to her right foot laterally and her heel that is dark/ appears to be consistent with pressure injury, unstageable.There is no drainage. There is tenderness with palpation. No fluctuance. There is no surrounding erythema or warmth         Medical Screening Exam   See Full Note    ED Course   Procedures  Labs Reviewed   BASIC METABOLIC PANEL - Abnormal       Result Value    Sodium 134 (*)     Potassium 5.1      Chloride 103      CO2 22      Anion Gap 14      Glucose 272 (*)     BUN 64 (*)     Creatinine 8.78 (*)     BUN/Creatinine Ratio 7      Calcium 7.5 (*)     eGFR 5 (*)    CBC WITH DIFFERENTIAL - Abnormal    WBC 6.04      RBC 3.61 (*)     Hemoglobin 9.7 (*)     Hematocrit 32.3 (*)      MCV 89.5      MCH 26.9 (*)     MCHC 30.0 (*)     RDW 18.3 (*)     Platelet Count 336      MPV 10.4      Neutrophils % 66.5 (*)     Lymphocytes % 23.7 (*)     Monocytes % 7.3 (*)     Eosinophils % 1.8      Basophils % 0.5      Immature Granulocytes % 0.2      nRBC, Auto 0.0      Neutrophils, Abs 4.02      Lymphocytes, Absolute 1.43      Monocytes, Absolute 0.44      Eosinophils, Absolute 0.11      Basophils, Absolute 0.03      Immature Granulocytes, Absolute 0.01      nRBC, Absolute 0.00      Diff Type Auto     PROTIME-INR - Normal    PT 14.1      INR 1.10     APTT - Normal    PTT 34.1     C-REACTIVE PROTEIN - Normal    CRP 0.37     CBC W/ AUTO DIFFERENTIAL    Narrative:     The following orders were created for panel order CBC auto differential.  Procedure                               Abnormality         Status                     ---------                               -----------         ------                     CBC with Differential[[id number]]       Abnormal            Final result                 Please view results for these tests on the individual orders.          Imaging Results              US ARTERIAL DOPPLER EXAM (In process)    Procedure changed from US Lower Extrem Arteries Bilat with KODAK (xpd)                    Medications   morphine injection 4 mg (4 mg Intravenous Given 8/5/24 2155)   ondansetron injection 4 mg (4 mg Intravenous Given 8/5/24 2150)   morphine injection 4 mg (4 mg Intravenous Given 8/5/24 2345)   ondansetron injection 4 mg (4 mg Intravenous Given 8/5/24 2345)     Medical Decision Making  37 y/o AAF presents to the emergency department with c/o painful blisters to her right foot. She states they are black. She states she first noticed them on yesterday. She went to outside facility where she was not pleased with her work up or treatment as she states they gave her a prescription for pain medication and sent her home. She did not get the pain medication filled. She states she is in  significant pain and request something for pain at this time. She denies any fever or chills. She reports generally not feeling well. She is a dialysis patient. She is  a diabetic. She states she knows her blood sugar has been running high but has not checked it regularly. She has had no nausea or vomiting. She has strong, palpable pulses, DP, PT. There is no drainage, erythema or warmth.    Problems Addressed:  Pressure injury of right foot, unstageable:     Details: Patient is afebrile and non toxic appearing. WBC count is normal. CRP is normal. Creatine up from baseline; however, states she missed dialysis this AM. She has no evidence of hypervolemia, no shortness of breath, she is oxygenating well on room air. She has no significant/life threatening electrolyte abnormalities. Analgesics given with improvement in pain. Wound care instructions given. Counseled on supportive measures. Referral placed to wound care. Follow up instructions given. Warning s/s discussed and return precautions given; the patient has v/u.      Amount and/or Complexity of Data Reviewed  Labs: ordered.    Risk  OTC drugs.  Prescription drug management.                                      Clinical Impression:   Final diagnoses:  [L89.890] Pressure injury of right foot, unstageable (Primary)        ED Disposition Condition    Discharge Stable          ED Prescriptions    None       Follow-up Information       Follow up With Specialties Details Why Contact Info    Manuela Mathis, [facility] Family Medicine Schedule an appointment as soon as possible for a visit   [address] [facility] [address]  [phone]               Nevin Isidro FNP  08/05/24 1385

## 2024-08-06 NOTE — DISCHARGE INSTRUCTIONS
Paint your wounds with betadine daily. A referral has been sent to wound care, they should call you with an appointment date and time. Be sure to change positions often. Keep a close check on your glucose and follow a diabetic diet. Follow up with your primary care provider within the next week for recheck and continued care and management. Return to the ED for worsening signs and symptoms or otherwise as needed.

## 2024-12-03 ENCOUNTER — HOSPITAL ENCOUNTER (EMERGENCY)
Facility: HOSPITAL | Age: 39
Discharge: HOME | End: 2024-12-03
Attending: EMERGENCY MEDICINE
Payer: MEDICARE

## 2024-12-03 VITALS
TEMPERATURE: 98 F | BODY MASS INDEX: 41.12 KG/M2 | SYSTOLIC BLOOD PRESSURE: 113 MMHG | WEIGHT: 262 LBS | DIASTOLIC BLOOD PRESSURE: 79 MMHG | HEART RATE: 100 BPM | HEIGHT: 67 IN | OXYGEN SATURATION: 100 % | RESPIRATION RATE: 18 BRPM

## 2024-12-03 DIAGNOSIS — Z99.2 ESRD ON DIALYSIS: Primary | ICD-10-CM

## 2024-12-03 DIAGNOSIS — N18.6 ESRD ON DIALYSIS: Primary | ICD-10-CM

## 2024-12-03 DIAGNOSIS — R06.02 SHORTNESS OF BREATH: ICD-10-CM

## 2024-12-03 LAB
ALBUMIN SERPL BCP-MCNC: 3.4 G/DL (ref 3.5–5)
ALBUMIN/GLOB SERPL: 0.6 {RATIO}
ALP SERPL-CCNC: 152 U/L (ref 40–150)
ALT SERPL W P-5'-P-CCNC: 7 U/L
ANION GAP SERPL CALCULATED.3IONS-SCNC: 16 MMOL/L (ref 7–16)
AST SERPL W P-5'-P-CCNC: 28 U/L (ref 5–34)
BASOPHILS # BLD AUTO: 0.03 K/UL (ref 0–0.2)
BASOPHILS NFR BLD AUTO: 0.6 % (ref 0–1)
BILIRUB SERPL-MCNC: 0.8 MG/DL
BUN SERPL-MCNC: 44 MG/DL (ref 7–19)
BUN/CREAT SERPL: 5 (ref 6–20)
CALCIUM SERPL-MCNC: 8.9 MG/DL (ref 8.4–10.2)
CHLORIDE SERPL-SCNC: 99 MMOL/L (ref 98–107)
CO2 SERPL-SCNC: 20 MMOL/L (ref 22–29)
CREAT SERPL-MCNC: 9.24 MG/DL (ref 0.55–1.02)
DIFFERENTIAL METHOD BLD: ABNORMAL
EGFR (NO RACE VARIABLE) (RUSH/TITUS): 5 ML/MIN/1.73M2
EOSINOPHIL # BLD AUTO: 0.1 K/UL (ref 0–0.5)
EOSINOPHIL NFR BLD AUTO: 1.9 % (ref 1–4)
ERYTHROCYTE [DISTWIDTH] IN BLOOD BY AUTOMATED COUNT: 16.9 % (ref 11.5–14.5)
GLOBULIN SER-MCNC: 5.3 G/DL (ref 2–4)
GLUCOSE SERPL-MCNC: 318 MG/DL (ref 74–100)
HCT VFR BLD AUTO: 39.6 % (ref 38–47)
HGB BLD-MCNC: 11.5 G/DL (ref 12–16)
IMM GRANULOCYTES # BLD AUTO: 0.02 K/UL (ref 0–0.04)
IMM GRANULOCYTES NFR BLD: 0.4 % (ref 0–0.4)
INFLUENZA A MOLECULAR (OHS): NEGATIVE
INFLUENZA B MOLECULAR (OHS): NEGATIVE
LIPASE SERPL-CCNC: 15 U/L
LYMPHOCYTES # BLD AUTO: 0.98 K/UL (ref 1–4.8)
LYMPHOCYTES NFR BLD AUTO: 18.4 % (ref 27–41)
MCH RBC QN AUTO: 24.8 PG (ref 27–31)
MCHC RBC AUTO-ENTMCNC: 29 G/DL (ref 32–36)
MCV RBC AUTO: 85.5 FL (ref 80–96)
MONOCYTES # BLD AUTO: 0.45 K/UL (ref 0–0.8)
MONOCYTES NFR BLD AUTO: 8.5 % (ref 2–6)
MPC BLD CALC-MCNC: 11 FL (ref 9.4–12.4)
NEUTROPHILS # BLD AUTO: 3.74 K/UL (ref 1.8–7.7)
NEUTROPHILS NFR BLD AUTO: 70.2 % (ref 53–65)
NRBC # BLD AUTO: 0 X10E3/UL
NRBC, AUTO (.00): 0 %
NT-PROBNP SERPL-MCNC: ABNORMAL PG/ML (ref 1–125)
OHS QRS DURATION: 96 MS
OHS QTC CALCULATION: 523 MS
PLATELET # BLD AUTO: 234 K/UL (ref 150–400)
POTASSIUM SERPL-SCNC: 4.1 MMOL/L (ref 3.5–5.1)
PROT SERPL-MCNC: 8.7 G/DL (ref 6.4–8.3)
RBC # BLD AUTO: 4.63 M/UL (ref 4.2–5.4)
SARS-COV-2 RDRP RESP QL NAA+PROBE: NEGATIVE
SODIUM SERPL-SCNC: 131 MMOL/L (ref 136–145)
TROPONIN I SERPL HS-MCNC: 28.2 NG/L
TROPONIN I SERPL HS-MCNC: 28.6 NG/L
WBC # BLD AUTO: 5.32 K/UL (ref 4.5–11)

## 2024-12-03 PROCEDURE — 25000242 PHARM REV CODE 250 ALT 637 W/ HCPCS: Performed by: EMERGENCY MEDICINE

## 2024-12-03 PROCEDURE — 99285 EMERGENCY DEPT VISIT HI MDM: CPT | Mod: 25

## 2024-12-03 PROCEDURE — 83880 ASSAY OF NATRIURETIC PEPTIDE: CPT | Performed by: EMERGENCY MEDICINE

## 2024-12-03 PROCEDURE — 80053 COMPREHEN METABOLIC PANEL: CPT | Performed by: EMERGENCY MEDICINE

## 2024-12-03 PROCEDURE — 83690 ASSAY OF LIPASE: CPT | Performed by: EMERGENCY MEDICINE

## 2024-12-03 PROCEDURE — 87635 SARS-COV-2 COVID-19 AMP PRB: CPT | Performed by: EMERGENCY MEDICINE

## 2024-12-03 PROCEDURE — 87502 INFLUENZA DNA AMP PROBE: CPT | Performed by: EMERGENCY MEDICINE

## 2024-12-03 PROCEDURE — 93005 ELECTROCARDIOGRAM TRACING: CPT

## 2024-12-03 PROCEDURE — 25000003 PHARM REV CODE 250: Performed by: EMERGENCY MEDICINE

## 2024-12-03 PROCEDURE — 96374 THER/PROPH/DIAG INJ IV PUSH: CPT

## 2024-12-03 PROCEDURE — 85025 COMPLETE CBC W/AUTO DIFF WBC: CPT | Performed by: EMERGENCY MEDICINE

## 2024-12-03 PROCEDURE — 93010 ELECTROCARDIOGRAM REPORT: CPT | Mod: ,,, | Performed by: HOSPITALIST

## 2024-12-03 PROCEDURE — 84484 ASSAY OF TROPONIN QUANT: CPT | Performed by: EMERGENCY MEDICINE

## 2024-12-03 PROCEDURE — 63600175 PHARM REV CODE 636 W HCPCS: Performed by: EMERGENCY MEDICINE

## 2024-12-03 PROCEDURE — 36415 COLL VENOUS BLD VENIPUNCTURE: CPT | Performed by: EMERGENCY MEDICINE

## 2024-12-03 RX ORDER — HYDRALAZINE HYDROCHLORIDE 20 MG/ML
20 INJECTION INTRAMUSCULAR; INTRAVENOUS
Status: COMPLETED | OUTPATIENT
Start: 2024-12-03 | End: 2024-12-03

## 2024-12-03 RX ORDER — IPRATROPIUM BROMIDE AND ALBUTEROL SULFATE 2.5; .5 MG/3ML; MG/3ML
3 SOLUTION RESPIRATORY (INHALATION)
Status: COMPLETED | OUTPATIENT
Start: 2024-12-03 | End: 2024-12-03

## 2024-12-03 RX ORDER — GUAIFENESIN 100 MG/5ML
200 SOLUTION ORAL EVERY 4 HOURS PRN
Status: DISCONTINUED | OUTPATIENT
Start: 2024-12-03 | End: 2024-12-03 | Stop reason: HOSPADM

## 2024-12-03 RX ORDER — HYDRALAZINE HYDROCHLORIDE 20 MG/ML
10 INJECTION INTRAMUSCULAR; INTRAVENOUS
Status: DISCONTINUED | OUTPATIENT
Start: 2024-12-03 | End: 2024-12-03 | Stop reason: HOSPADM

## 2024-12-03 RX ORDER — ACETAMINOPHEN 500 MG
1000 TABLET ORAL
Status: COMPLETED | OUTPATIENT
Start: 2024-12-03 | End: 2024-12-03

## 2024-12-03 RX ORDER — ALBUTEROL SULFATE 0.83 MG/ML
2.5 SOLUTION RESPIRATORY (INHALATION)
Status: DISCONTINUED | OUTPATIENT
Start: 2024-12-03 | End: 2024-12-03

## 2024-12-03 RX ADMIN — ACETAMINOPHEN 1000 MG: 500 TABLET ORAL at 05:12

## 2024-12-03 RX ADMIN — IPRATROPIUM BROMIDE AND ALBUTEROL SULFATE 3 ML: .5; 3 SOLUTION RESPIRATORY (INHALATION) at 08:12

## 2024-12-03 RX ADMIN — HYDRALAZINE HYDROCHLORIDE 20 MG: 20 INJECTION INTRAMUSCULAR; INTRAVENOUS at 04:12

## 2024-12-03 RX ADMIN — GUAIFENESIN 200 MG: 200 SOLUTION ORAL at 08:12

## 2024-12-03 NOTE — ED PROVIDER NOTES
Encounter Date: 12/3/2024       History     Chief Complaint   Patient presents with    Chest Pain    Shortness of Breath     Pt presents to ED via POV with c/o chest pain and SOB that started today. Pt missed dialysis treatment yesterday. M/W/F     Patient is a 38 year old  female who is wheelchair bound with a a significant past medical history of stroke, DM, and currently on dialysis 3x week presents to the ED complaining of shortness of breath that started today. She admits to abdominal pain and back pain that started 2 days ago. Today the patient complains of headache, confusion, wheezing, fatigue, sore throat, rhinorrhea, cough with green sputum. Patient admits to missing dialysis yesterday due to feeling ill. She states she vomited clear liquid once yesterday and again this morning. Her last meal was sausage and biscuit this morning after emesis and the only liquids she has consumed is one bottle of water. She denies fever, chills, diaphoresis, changes in bowel habits, visual changes, palpitations, skin changes, bed sores/open wounds, and history of asthma or COPD. She further denies smoking, consuming alcohol, or illicit drug use.       The history is provided by the patient. No  was used.     Review of patient's allergies indicates:  No Known Allergies  Past Medical History:   Diagnosis Date    Acute cystitis 3/27/2021    Diabetes mellitus     type II    Diabetic ketoacidosis without coma associated with type 2 diabetes mellitus 3/13/2021    HTN (hypertension) 3/19/2021    Hyperkalemia 4/11/2021    Hypertension     Ketoacidosis due to type 2 diabetes mellitus     Morbid obesity     Symptomatic anemia      Past Surgical History:   Procedure Laterality Date    abcess of buttocks      CLOSURE OF WOUND Left 3/30/2021    Procedure: CLOSURE, WOUND;  Surgeon: Sebas Shea DO;  Location: [facility];  Service: General;  Laterality: Left;    COLON SURGERY      ILEOSTOMY  CLOSURE N/A 2021    Procedure: CLOSURE, ILEOSTOMY;  Surgeon: Sebas Shea DO;  Location: [facility] OR;  Service: General;  Laterality: N/A;    wound debribement       Family History   Problem Relation Name Age of Onset    Heart disease Mother      Hypertension Mother      Diabetes Mother      Kidney failure Mother      Hypertension Father      Diabetes Father      Heart disease Father       Social History     Tobacco Use    Smoking status: Former     Types: Cigars     Start date: 2000     Quit date: 2022     Years since quittin.0    Smokeless tobacco: Never    Tobacco comments:     Black and Mild cigars 3-4 per day since age 15   Substance Use Topics    Alcohol use: Never    Drug use: Never     Review of Systems   Constitutional:  Positive for appetite change.   HENT:  Positive for congestion, rhinorrhea and sore throat.    Respiratory:  Positive for cough, shortness of breath and wheezing.    Cardiovascular:  Positive for chest pain.   Gastrointestinal:  Positive for abdominal pain, nausea and vomiting.   Neurological:  Positive for headaches.   All other systems reviewed and are negative.      Physical Exam     Initial Vitals [24 1526]   BP Pulse Resp Temp SpO2   (!) 224/123 93 20 97.8 °F (36.6 °C) 100 %      MAP       --         Physical Exam    HENT:   Head: Normocephalic and atraumatic.   Cardiovascular:  Normal heart sounds.           Pulmonary/Chest: Breath sounds normal. She has no wheezes. She has no rales.   Abdominal: Abdomen is soft. Bowel sounds are normal. There is no abdominal tenderness.     Neurological: She is alert and oriented to person, place, and time.   Skin: Skin is dry.   Psychiatric: She has a normal mood and affect. Her behavior is normal. Judgment and thought content normal.         Medical Screening Exam   See Full Note    ED Course   Procedures  Labs Reviewed   COMPREHENSIVE METABOLIC PANEL - Abnormal       Result Value    Sodium 131 (*)     Potassium 4.1       Chloride 99      CO2 20 (*)     Anion Gap 16      Glucose 318 (*)     BUN 44 (*)     Creatinine 9.24 (*)     BUN/Creatinine Ratio 5 (*)     Calcium 8.9      Total Protein 8.7 (*)     Albumin 3.4 (*)     Globulin 5.3 (*)     A/G Ratio 0.6      Bilirubin, Total 0.8      Alk Phos 152 (*)     ALT 7      AST 28      eGFR 5 (*)    TROPONIN I - Abnormal    Troponin I High Sensitivity 28.6 (*)    NT-PRO NATRIURETIC PEPTIDE - Abnormal    ProBNP 13,638 (*)    CBC WITH DIFFERENTIAL - Abnormal    WBC 5.32      RBC 4.63      Hemoglobin 11.5 (*)     Hematocrit 39.6      MCV 85.5      MCH 24.8 (*)     MCHC 29.0 (*)     RDW 16.9 (*)     Platelet Count 234      MPV 11.0      Neutrophils % 70.2 (*)     Lymphocytes % 18.4 (*)     Monocytes % 8.5 (*)     Eosinophils % 1.9      Basophils % 0.6      Immature Granulocytes % 0.4      nRBC, Auto 0.0      Neutrophils, Abs 3.74      Lymphocytes, Absolute 0.98 (*)     Monocytes, Absolute 0.45      Eosinophils, Absolute 0.10      Basophils, Absolute 0.03      Immature Granulocytes, Absolute 0.02      nRBC, Absolute 0.00      Diff Type Auto     TROPONIN I - Abnormal    Troponin I High Sensitivity 28.2 (*)    INFLUENZA A & B BY MOLECULAR - Normal    INFLUENZA A MOLECULAR Negative      INFLUENZA B MOLECULAR  Negative     SARS-COV-2 RNA AMPLIFICATION, QUAL - Normal    SARS COV-2 Molecular Negative      Narrative:     Negative SARS-CoV results should not be used as the sole basis for treatment or patient management decisions; negative results should be considered in the context of a patient's recent exposures, history and the presene of clinical signs and symptoms consistent with COVID-19.  Negative results should be treated as presumptive and confirmed by molecular assay, if necessary for patient management.   LIPASE - Normal    Lipase 15     CBC W/ AUTO DIFFERENTIAL    Narrative:     The following orders were created for panel order CBC auto differential.  Procedure                                Abnormality         Status                     ---------                               -----------         ------                     CBC with Differential[[id number]]       Abnormal            Final result                 Please view results for these tests on the individual orders.   URINALYSIS, REFLEX TO URINE CULTURE        ECG Results              EKG 12-lead (Final result)        Collection Time Result Time QRS Duration OHS QTC Calculation    12/03/24 15:22:02 12/03/24 21:40:45 96 523                     Final result by Interface, Lab In [facility] (12/03/24 21:40:53)                   Narrative:    Test Reason : R06.02,    Vent. Rate : 105 BPM     Atrial Rate : 105 BPM     P-R Int : 152 ms          QRS Dur :  96 ms      QT Int : 396 ms       P-R-T Axes :  57  58  68 degrees    QTcB Int : 523 ms    Sinus tachycardia  Possible Left atrial enlargement  Low voltage QRS  Cannot rule out Anterior infarct ,age undetermined  Prolonged QT  Abnormal ECG  No previous ECGs available  Confirmed by Karen Robles ([id number]) on 12/3/2024 9:40:39 PM    Referred By: AAAREFERRAL SELF           Confirmed By: Karen Robles                                  Imaging Results              X-Ray Chest AP Portable (Final result)  Result time 12/03/24 16:32:34      Final result by Tristen Larsen MD (12/03/24 16:32:34)                   Impression:      Cardiomegaly with mild pulmonary vascular congestion.  The findings may be seen with fluid overload state.    Partially visualized vascular stent in the left axillary region.      Electronically signed by: Tristen Larsen MD  Date:    12/03/2024  Time:    16:32               Narrative:    EXAMINATION:  XR CHEST AP PORTABLE    CLINICAL HISTORY:  Dyspnea;    TECHNIQUE:  Single frontal view of the chest was performed.    COMPARISON:  05/25/2022.    FINDINGS:  There is a vascular stent in the left axillary region.  Monitoring EKG leads are present.  The trachea is unremarkable.    The  cardiomediastinal silhouette is enlarged.  There is no evidence of free air beneath the hemidiaphragms.  There are no pleural effusions.  There is no evidence of a pneumothorax.  There is no evidence of pneumomediastinum.  There is pulmonary vascular congestion.  There is no focal consolidation.  There is a skin fold overlying the chest.  The osseous structures are unremarkable.                                       Medications   hydrALAZINE injection 20 mg (20 mg Intravenous Given 12/3/24 1640)   acetaminophen tablet 1,000 mg (1,000 mg Oral Given 12/3/24 1759)   albuterol-ipratropium 2.5 mg-0.5 mg/3 mL nebulizer solution 3 mL (3 mLs Nebulization Given 12/3/24 2056)     Medical Decision Making             ED Course as of 12/04/24 1039   Tue Dec 03, 2024   1600 Medical decision-making:  Differential diagnosis includes chest pain, shortness breath, influenza, COVID-19, pneumonia, CHF, STEMI, NSTEMI, ACS, hypertensive urgency.  All testing ordered and interpreted by me. [BB]   1714 Awaiting labs [BB]   1737 Flu is negative.  COVID is negative.  CBC is normal. [BB]   1737 Chest x-ray by my interpretation shows cardiomegaly, possible mild CHF. [BB]      ED Course User Index  [BB] Miller Umanzor MD                             Clinical Impression:   Final diagnoses:  [R06.02] Shortness of breath  [N18.6, Z99.2] ESRD on dialysis (Primary)        ED Disposition Condition    Discharge Stable          ED Prescriptions    None       Follow-up Information       Follow up With Specialties Details Why Contact Info    [facility] - Emergency Department Emergency Medicine  As needed [address] [place] [id number] [phone]             Miller Umanzor MD  12/04/24 [id number]

## 2024-12-05 ENCOUNTER — TELEPHONE (OUTPATIENT)
Dept: EMERGENCY MEDICINE | Facility: HOSPITAL | Age: 39
End: 2024-12-05
Payer: MEDICARE

## 2025-02-06 ENCOUNTER — TELEPHONE (OUTPATIENT)
Dept: FAMILY MEDICINE | Facility: CLINIC | Age: 40
End: 2025-02-06
Payer: MEDICARE

## 2025-02-06 NOTE — TELEPHONE ENCOUNTER
----- Message from Jewels sent at 2/6/2025  1:23 PM CST -----  Regarding: APPOINTMENT  Who Called: Yolie Egan    Caller is requesting a sooner appointment.     When is the first available appointment?MARCH 19  Options offered (Virtual Visit, Urgent Care):   Symptoms:JUST NEEDS A CHECK UP      Preferred Method of Contact: Phone Call  Patient's Preferred Phone Number on File: [phone]   Best Call Back Number, if different:  Additional Information: PATIENT IS A DIALYSIS PATIENT ON MONDAY,WEDNESDAY AND FRIDAY AND NEEDS A TUESDAY OR THURSDAY APPOINTMENT.

## 2025-04-02 ENCOUNTER — TELEPHONE (OUTPATIENT)
Dept: FAMILY MEDICINE | Facility: CLINIC | Age: 40
End: 2025-04-02
Payer: MEDICARE

## 2025-04-02 NOTE — TELEPHONE ENCOUNTER
----- Message from Joann sent at 4/2/2025 12:21 PM CDT -----  Who Called:Lilliam , [phone] is requesting assistance/information from provider's office.She is wanting to speak  w nurse or Manuela Mathis about a prescription for pt of gabapentin 100 mg 3x daily for recent surgery she had w Dr. Puckett. Femoral bipas Preferred Method of Contact: Phone CallPatient's Preferred Phone Number on File: [phone] Call Back Number, if different:Additional Information:

## 2025-04-24 ENCOUNTER — OFFICE VISIT (OUTPATIENT)
Dept: FAMILY MEDICINE | Facility: CLINIC | Age: 40
End: 2025-04-24
Payer: MEDICARE

## 2025-04-24 VITALS
WEIGHT: 252 LBS | OXYGEN SATURATION: 100 % | HEART RATE: 101 BPM | SYSTOLIC BLOOD PRESSURE: 168 MMHG | HEIGHT: 67 IN | TEMPERATURE: 98 F | BODY MASS INDEX: 39.55 KG/M2 | DIASTOLIC BLOOD PRESSURE: 92 MMHG

## 2025-04-24 DIAGNOSIS — I50.9 CHRONIC CONGESTIVE HEART FAILURE, UNSPECIFIED HEART FAILURE TYPE: ICD-10-CM

## 2025-04-24 DIAGNOSIS — Z99.2 ESRD ON DIALYSIS: ICD-10-CM

## 2025-04-24 DIAGNOSIS — N18.6 ESRD ON DIALYSIS: ICD-10-CM

## 2025-04-24 DIAGNOSIS — E11.9 TYPE 2 DIABETES MELLITUS WITHOUT COMPLICATION, WITHOUT LONG-TERM CURRENT USE OF INSULIN: ICD-10-CM

## 2025-04-24 DIAGNOSIS — I10 ESSENTIAL HYPERTENSION, BENIGN: ICD-10-CM

## 2025-04-24 DIAGNOSIS — E66.01 SEVERE OBESITY (BMI 35.0-39.9) WITH COMORBIDITY: ICD-10-CM

## 2025-04-24 DIAGNOSIS — K52.9 CHRONIC DIARRHEA: Primary | ICD-10-CM

## 2025-04-24 DIAGNOSIS — Z99.3 WHEELCHAIR DEPENDENT: ICD-10-CM

## 2025-04-24 DIAGNOSIS — E61.1 IRON DEFICIENCY: ICD-10-CM

## 2025-04-24 DIAGNOSIS — Z12.31 SCREENING MAMMOGRAM FOR BREAST CANCER: ICD-10-CM

## 2025-04-24 DIAGNOSIS — Z86.73 HISTORY OF CVA (CEREBROVASCULAR ACCIDENT): ICD-10-CM

## 2025-04-24 PROBLEM — E11.22 TYPE 2 DIABETES MELLITUS WITH STAGE 4 CHRONIC KIDNEY DISEASE, WITH LONG-TERM CURRENT USE OF INSULIN: Status: RESOLVED | Noted: 2021-03-18 | Resolved: 2025-04-24

## 2025-04-24 PROBLEM — L02.212 ABSCESS OF BACK: Status: RESOLVED | Noted: 2022-12-03 | Resolved: 2025-04-24

## 2025-04-24 PROBLEM — S21.209A OPEN WOUND OF BACK: Status: RESOLVED | Noted: 2023-01-12 | Resolved: 2025-04-24

## 2025-04-24 PROBLEM — Z23 IMMUNIZATION DUE: Status: RESOLVED | Noted: 2023-01-12 | Resolved: 2025-04-24

## 2025-04-24 PROBLEM — N18.4 TYPE 2 DIABETES MELLITUS WITH STAGE 4 CHRONIC KIDNEY DISEASE, WITH LONG-TERM CURRENT USE OF INSULIN: Status: RESOLVED | Noted: 2021-03-18 | Resolved: 2025-04-24

## 2025-04-24 PROBLEM — S31.000A SACRAL WOUND: Status: RESOLVED | Noted: 2021-03-27 | Resolved: 2025-04-24

## 2025-04-24 PROBLEM — N64.4 BREAST PAIN: Status: RESOLVED | Noted: 2023-01-30 | Resolved: 2025-04-24

## 2025-04-24 PROBLEM — Z11.59 SCREENING FOR VIRAL DISEASE: Status: RESOLVED | Noted: 2023-01-12 | Resolved: 2025-04-24

## 2025-04-24 PROBLEM — Z12.4 SCREENING FOR MALIGNANT NEOPLASM OF THE CERVIX: Status: RESOLVED | Noted: 2023-01-30 | Resolved: 2025-04-24

## 2025-04-24 PROBLEM — D62 ACUTE BLOOD LOSS ANEMIA: Status: RESOLVED | Noted: 2022-12-04 | Resolved: 2025-04-24

## 2025-04-24 PROBLEM — E11.10 DKA, TYPE 2, NOT AT GOAL: Status: RESOLVED | Noted: 2021-03-18 | Resolved: 2025-04-24

## 2025-04-24 PROBLEM — N18.4 STAGE 4 CHRONIC KIDNEY DISEASE: Status: RESOLVED | Noted: 2022-12-04 | Resolved: 2025-04-24

## 2025-04-24 PROBLEM — Z79.899 OTHER LONG TERM (CURRENT) DRUG THERAPY: Status: RESOLVED | Noted: 2023-01-12 | Resolved: 2025-04-24

## 2025-04-24 PROBLEM — N92.0 MENORRHAGIA WITH REGULAR CYCLE: Status: RESOLVED | Noted: 2023-05-15 | Resolved: 2025-04-24

## 2025-04-24 PROBLEM — N64.52 DISCHARGE FROM BREAST: Status: RESOLVED | Noted: 2023-01-30 | Resolved: 2025-04-24

## 2025-04-24 PROBLEM — E66.811 CLASS 1 OBESITY WITH BODY MASS INDEX (BMI) OF 33.0 TO 33.9 IN ADULT: Status: RESOLVED | Noted: 2022-12-04 | Resolved: 2025-04-24

## 2025-04-24 PROBLEM — U07.1 COVID-19: Status: RESOLVED | Noted: 2021-03-20 | Resolved: 2025-04-24

## 2025-04-24 PROBLEM — Z79.4 TYPE 2 DIABETES MELLITUS WITH STAGE 4 CHRONIC KIDNEY DISEASE, WITH LONG-TERM CURRENT USE OF INSULIN: Status: RESOLVED | Noted: 2021-03-18 | Resolved: 2025-04-24

## 2025-04-24 PROCEDURE — 3077F SYST BP >= 140 MM HG: CPT | Mod: ,,, | Performed by: NURSE PRACTITIONER

## 2025-04-24 PROCEDURE — 3008F BODY MASS INDEX DOCD: CPT | Mod: ,,, | Performed by: NURSE PRACTITIONER

## 2025-04-24 PROCEDURE — 99215 OFFICE O/P EST HI 40 MIN: CPT | Mod: ,,, | Performed by: NURSE PRACTITIONER

## 2025-04-24 PROCEDURE — 4010F ACE/ARB THERAPY RXD/TAKEN: CPT | Mod: ,,, | Performed by: NURSE PRACTITIONER

## 2025-04-24 PROCEDURE — 3080F DIAST BP >= 90 MM HG: CPT | Mod: ,,, | Performed by: NURSE PRACTITIONER

## 2025-04-24 PROCEDURE — 1159F MED LIST DOCD IN RCRD: CPT | Mod: ,,, | Performed by: NURSE PRACTITIONER

## 2025-04-24 RX ORDER — METOPROLOL TARTRATE 50 MG/1
50 TABLET ORAL 2 TIMES DAILY
COMMUNITY
Start: 2025-03-04 | End: 2025-04-24

## 2025-04-24 RX ORDER — ATORVASTATIN CALCIUM 40 MG/1
40 TABLET, FILM COATED ORAL NIGHTLY
Qty: 90 TABLET | Refills: 1 | Status: SHIPPED | OUTPATIENT
Start: 2025-04-24

## 2025-04-24 RX ORDER — METOPROLOL SUCCINATE 100 MG/1
100 TABLET, EXTENDED RELEASE ORAL DAILY
Qty: 90 TABLET | Refills: 1 | Status: SHIPPED | OUTPATIENT
Start: 2025-04-24 | End: 2026-04-24

## 2025-04-24 RX ORDER — BLOOD-GLUCOSE SENSOR
1 EACH MISCELLANEOUS
Qty: 2 EACH | Refills: 5 | Status: SHIPPED | OUTPATIENT
Start: 2025-04-24 | End: 2026-04-24

## 2025-04-24 RX ORDER — BLOOD-GLUCOSE,RECEIVER,CONT
1 EACH MISCELLANEOUS
Qty: 2 EACH | Refills: 5 | Status: SHIPPED | OUTPATIENT
Start: 2025-04-24 | End: 2026-04-24

## 2025-04-24 RX ORDER — CHOLESTYRAMINE 4 G/9G
POWDER, FOR SUSPENSION ORAL
COMMUNITY
Start: 2025-03-07 | End: 2025-04-24

## 2025-04-24 RX ORDER — LOSARTAN POTASSIUM 50 MG/1
50 TABLET ORAL
COMMUNITY
Start: 2025-02-07 | End: 2025-04-24

## 2025-04-24 RX ORDER — INSULIN GLARGINE 100 [IU]/ML
20 INJECTION, SOLUTION SUBCUTANEOUS NIGHTLY
Qty: 18 ML | Refills: 3 | Status: SHIPPED | OUTPATIENT
Start: 2025-04-24 | End: 2026-04-24

## 2025-04-24 RX ORDER — HYDROCODONE BITARTRATE AND ACETAMINOPHEN 7.5; 325 MG/1; MG/1
1 TABLET ORAL 4 TIMES DAILY PRN
COMMUNITY
Start: 2025-04-10 | End: 2025-04-24

## 2025-04-24 RX ORDER — ASPIRIN 325 MG
325 TABLET ORAL DAILY
Qty: 90 TABLET | Refills: 1 | Status: SHIPPED | OUTPATIENT
Start: 2025-04-24 | End: 2026-04-24

## 2025-04-24 RX ORDER — TAMSULOSIN HYDROCHLORIDE 0.4 MG/1
CAPSULE ORAL
COMMUNITY
Start: 2025-03-04 | End: 2025-04-24

## 2025-04-24 RX ORDER — SACUBITRIL AND VALSARTAN 97; 103 MG/1; MG/1
1 TABLET, FILM COATED ORAL 2 TIMES DAILY
Qty: 180 TABLET | Refills: 3 | Status: SHIPPED | OUTPATIENT
Start: 2025-04-24

## 2025-04-24 NOTE — PROGRESS NOTES
Manuela Mathis DNP   [address] [place] [address]     PATIENT NAME: Yolie Egan  : 1985  DATE: 25  MRN: [id number]      Billing Provider: Manuela Mathis DNP  Level of Service: [facility] OFFICE[facility]PT [facility]ISIT, ANIL, SAMEER HERNANDEZ, 40-54 MIN  Patient PCP Information       None on File            Reason for Visit / Chief Complaint: Follow-up ([facility]), Breast Problem (C/O KNOT ON LEFT BREAST - NOTICED 3 WEEKS AGO - LAST [facility] ), and Health Maintenance (Mammogram due on 2024/COVID-19 Vaccine(1 - 2024-25 season) Never done/)       Update PCP  Update Chief Complaint         History of Present Illness / Problem Focused Workflow     Yolie Egan presents to the clinic with Follow-up ([facility]), Breast Problem (C/O KNOT ON LEFT BREAST - NOTICED 3 WEEKS AGO - LAST [facility] ), and Health Maintenance (Mammogram due on 2024/COVID-19 Vaccine(1 - 2024-25 season) Never done/)     HPI    Review of Systems     Review of Systems     Medical / Social / Family History     Past Medical History:   Diagnosis Date    Acute cystitis 3/27/2021    Diabetes mellitus     type II    Diabetic ketoacidosis without coma associated with type 2 diabetes mellitus 3/13/2021    HTN (hypertension) 3/19/2021    Hyperkalemia 2021    Hypertension     Ketoacidosis due to type 2 diabetes mellitus     Morbid obesity     Symptomatic anemia        Past Surgical History:   Procedure Laterality Date    abcess of buttocks      CLOSURE OF WOUND Left 3/30/2021    Procedure: CLOSURE, WOUND;  Surgeon: Sebas Shea DO;  Location: [facility] OR;  Service: General;  Laterality: Left;    COLON SURGERY      ILEOSTOMY CLOSURE N/A 2021    Procedure: CLOSURE, ILEOSTOMY;  Surgeon: Sebas Shea DO;  Location: [facility] OR;  Service: General;  Laterality: N/A;    wound debribement         Social History  Ms.  reports that she quit smoking about 2 years ago. Her smoking use included cigars. She started  "smoking about 25 years ago. She has never used smokeless tobacco. She reports that she does not drink alcohol and does not use drugs.    Family History  Ms.'s family history includes Diabetes in her father and mother; Heart disease in her father and mother; Hypertension in her father and mother; Kidney failure in her mother.    Medications and Allergies     Medications  Outpatient Medications Marked as Taking for the 4/24/25 encounter (Office Visit) with Manuela Mathis DNP   Medication Sig Dispense Refill    sodium bicarbonate 650 MG tablet TAKE 1 TABLET (650 MG TOTAL) BY MOUTH 2 (TWO) TIMES DAILY. 180 tablet 1    [DISCONTINUED] amLODIPine (NORVASC) 2.5 MG tablet Take 2.5 mg by mouth.      [DISCONTINUED] atorvastatin (LIPITOR) 40 MG tablet Take 1 tablet (40 mg total) by mouth every evening. 90 tablet 1    [DISCONTINUED] cholestyramine, with sugar, 4 gram Powd Take by mouth.      [DISCONTINUED] HYDROcodone-acetaminophen (NORCO) 7.5-325 mg per tablet Take 1 tablet by mouth 4 (four) times daily as needed.      [DISCONTINUED] insulin (BASAGLAR KWIKPEN U-100 INSULIN) glargine 100 units/mL SubQ pen Inject 18 Units into the skin every evening. 16.2 mL 3    [DISCONTINUED] losartan (COZAAR) 50 MG tablet Take 50 mg by mouth.      [DISCONTINUED] metoprolol tartrate (LOPRESSOR) 50 MG tablet Take 50 mg by mouth 2 (two) times daily.      [DISCONTINUED] tamsulosin (FLOMAX) 0.4 mg Cap Take by mouth.         Allergies  Review of patient's allergies indicates:  No Known Allergies    Physical Examination   BP (!) 168/92 (BP Location: Right arm, Patient Position: Sitting)   Pulse 101   Temp 98 °F (36.7 °C)   Ht 5' 7" (1.702 m)   Wt 114.3 kg (252 lb)   LMP 04/16/2025   SpO2 100%   BMI 39.47 kg/m²    Physical Exam  Vitals and nursing note reviewed.   Constitutional:       Appearance: Normal appearance. She is obese.   HENT:      Head: Normocephalic.      Nose: Nose normal.      Mouth/Throat:      Mouth: Mucous membranes are " moist.   Eyes:      Pupils: Pupils are equal, round, and reactive to light.   Cardiovascular:      Rate and Rhythm: Normal rate and regular rhythm.      Pulses: Normal pulses.      Heart sounds: Murmur heard.   Pulmonary:      Effort: Pulmonary effort is normal.      Breath sounds: Normal breath sounds.   Abdominal:      General: Abdomen is flat. Bowel sounds are normal.      Palpations: Abdomen is soft.   Musculoskeletal:         General: Deformity present.      Cervical back: Normal range of motion and neck supple.      Comments:   In wheelchair she borrowed. Sits in chair all day. Difficulty with transfer as her left side is significantly weak. +foot drop also. Uses right side of body to assist left side of body.    Skin:     General: Skin is warm and dry.      Capillary Refill: Capillary refill takes less than 2 seconds.      Findings: Lesion present.      Comments: Scarring and keloid to mid back from hospital abscess removal.   Neurological:      General: No focal deficit present.      Mental Status: She is alert and oriented to person, place, and time. Mental status is at baseline.      Motor: Weakness present.      Gait: Gait abnormal.   Psychiatric:         Mood and Affect: Mood normal.         Behavior: Behavior normal.         Thought Content: Thought content normal.         Judgment: Judgment normal.          Assessment and Plan (including Health Maintenance)      Problem List  Smart Sets  Document Outside HM   :    Plan:   Here to reestablish care  Has been in and  off and on since May 2022  Says shes had CHF Nstemi CVA left side residual In manual wheelchair she borrowed since leaving hospital in 2022  Lives with partner in assisted living     pap 1/2023  Pcv 1/2023  Tdap 1/2023     Labs today      Eye exam -[facility]   Last seen in clinic 5/2023 here for hospital fu appt  Many issues and complications  On dialysis in [facility] MWF     Needing refills  Rt leg swelling +3 had femcath for stent -  had DVT to rt leg. Will request records  Start back on meds.   Has none currently.   Start back insulin at 20 units bedtime  Dexcom 7 placed - sample provided in clinic and taught pt and partner how to use    Reviewed all meds and times in which to take them .   Home health to fu with pt   See back 1-2 weeks in clinic for repeat bp and BS log check   In older manual wheelchair from friend she borrowed  Has power wheelchair at home.     Chronic diarrhea since ileostomy reversal 2022 - requesting to see GI   Schedule mmg for [place] as well.            Health Maintenance Due   Topic Date Due    Mammogram  02/08/2024    COVID-19 Vaccine (1 - 2024-25 season) Never done       Problem List Items Addressed This Visit          Neuro    History of CVA (cerebrovascular accident)       Cardiac/Vascular    Congestive heart failure    Essential hypertension, benign       Renal/    ESRD on dialysis    Screening mammogram for breast cancer    Relevant Orders    Mammo Digital Screening Bilat w/ Rocky (XPD)       Oncology    Iron deficiency       Endocrine    Type 2 diabetes mellitus, without long-term current use of insulin (Chronic)    Relevant Medications    insulin glargine U-100, Lantus, (LANTUS U-100 INSULIN) 100 unit/mL injection    Other Relevant Orders    POCT Glucose, Hand-Held Device    Severe obesity (BMI 35.0-39.9) with comorbidity       GI    Chronic diarrhea - Primary    Relevant Orders    Ambulatory referral/consult to Gastroenterology       Other    Wheelchair dependent       Health Maintenance Topics with due status: Not Due       Topic Last Completion Date    TETANUS VACCINE 01/12/2023    Cervical Cancer Screening 01/30/2023    RSV Vaccine (Age 60+ and Pregnant patients) Not Due       Future Appointments   Date Time Provider Department Center   5/12/2025  3:30 PM Manuela Mathis DNP [facility]            Signature:  Manuela Mathis DNP      [address] [place]  [address]    Date of encounter: 4/24/25

## 2025-05-11 PROBLEM — G62.9 NEUROPATHY: Status: ACTIVE | Noted: 2025-05-11

## 2025-05-11 PROBLEM — I25.10 CAD (CORONARY ARTERY DISEASE): Chronic | Status: ACTIVE | Noted: 2024-01-31

## 2025-05-13 ENCOUNTER — PATIENT MESSAGE (OUTPATIENT)
Dept: FAMILY MEDICINE | Facility: CLINIC | Age: 40
End: 2025-05-13
Payer: MEDICARE

## 2025-07-08 ENCOUNTER — EXTERNAL HOME HEALTH (OUTPATIENT)
Dept: HOME HEALTH SERVICES | Facility: HOSPITAL | Age: 40
End: 2025-07-08
Payer: MEDICARE

## 2025-08-14 ENCOUNTER — TELEPHONE (OUTPATIENT)
Dept: FAMILY MEDICINE | Facility: CLINIC | Age: 40
End: 2025-08-14
Payer: MEDICARE

## 2025-08-21 ENCOUNTER — TELEPHONE (OUTPATIENT)
Dept: FAMILY MEDICINE | Facility: CLINIC | Age: 40
End: 2025-08-21
Payer: MEDICARE

## (undated) DEVICE — NEEDLE ECLIPSE 22GX1 1/2 IN SAFETY

## (undated) DEVICE — GLOVE SURGICAL PROTEXIS PI BLUE SIZE 6.5

## (undated) DEVICE — STAPLER SKIN REFLEX ONE 35 WIDE DISP

## (undated) DEVICE — GLOVE SURGICAL PROTEXIS PI BLUE SIZE 8.0

## (undated) DEVICE — CDS BASIC

## (undated) DEVICE — SUTURE SILK 2-0 FS 18 BLACK

## (undated) DEVICE — SUTURE ETHILON 2-0 18 BLACK FS

## (undated) DEVICE — Device

## (undated) DEVICE — CUTTER LINEAR GIA 75MM BLUE

## (undated) DEVICE — GLOVE SURGICAL PROTEXIS PI SIZE 7

## (undated) DEVICE — SUTURE VICRYL 3-0 SH UNDYED 27IN

## (undated) DEVICE — SPONGE GAUZE 4X4 12 PLY STL AMD 10/TRAY

## (undated) DEVICE — DRAIN PENROSE LATEX 1/4 X 18IN STERILE

## (undated) DEVICE — GOWN SURGICAL STERILE LEVEL 3 / XX-LARGE

## (undated) DEVICE — SUTURE VICRYL 3-0  SH UNDYED 18IN

## (undated) DEVICE — GLOVE SURGICAL PROTEXIS PI SIZE 7.5

## (undated) DEVICE — CUTTER LINEAR RELOAD GIA 75MM BLUE

## (undated) DEVICE — SUTURE ETHIBOND 0 18 GREEN CT1

## (undated) DEVICE — SUTURE SILK 0 FSL

## (undated) DEVICE — GLOVE SURGICAL PROTEXIS PI SIZE 6.5

## (undated) DEVICE — BLADE INSULATED COATED 6IN

## (undated) DEVICE — STAPLER SKIN REFLEX ONE 35 REG DISP

## (undated) DEVICE — GLOVE SURGICAL PROTEXIS PI BLUE SIZE 7.0